# Patient Record
Sex: FEMALE | Race: WHITE | Employment: OTHER | URBAN - NONMETROPOLITAN AREA
[De-identification: names, ages, dates, MRNs, and addresses within clinical notes are randomized per-mention and may not be internally consistent; named-entity substitution may affect disease eponyms.]

---

## 2017-01-05 ENCOUNTER — TELEPHONE (OUTPATIENT)
Dept: NEUROLOGY | Age: 52
End: 2017-01-05

## 2017-01-09 ENCOUNTER — TELEPHONE (OUTPATIENT)
Dept: NEUROLOGY | Age: 52
End: 2017-01-09

## 2017-01-12 RX ORDER — BUTALBITAL, ACETAMINOPHEN, CAFFEINE AND CODEINE PHOSPHATE 50; 325; 40; 30 MG/1; MG/1; MG/1; MG/1
1 CAPSULE ORAL EVERY 8 HOURS PRN
Qty: 60 CAPSULE | Refills: 5 | Status: SHIPPED | OUTPATIENT
Start: 2017-01-12 | End: 2017-06-23 | Stop reason: SDUPTHER

## 2017-01-25 ENCOUNTER — PROCEDURE VISIT (OUTPATIENT)
Dept: NEUROLOGY | Age: 52
End: 2017-01-25
Payer: MEDICARE

## 2017-01-25 VITALS
HEIGHT: 63 IN | SYSTOLIC BLOOD PRESSURE: 152 MMHG | HEART RATE: 113 BPM | WEIGHT: 133 LBS | BODY MASS INDEX: 23.57 KG/M2 | DIASTOLIC BLOOD PRESSURE: 101 MMHG

## 2017-01-25 DIAGNOSIS — R49.0 VOICE HOARSENESS: ICD-10-CM

## 2017-01-25 DIAGNOSIS — G43.909 MIGRAINE WITHOUT STATUS MIGRAINOSUS, NOT INTRACTABLE, UNSPECIFIED MIGRAINE TYPE: Primary | ICD-10-CM

## 2017-01-25 DIAGNOSIS — G89.29 OTHER CHRONIC PAIN: ICD-10-CM

## 2017-01-25 DIAGNOSIS — R51.9 HEADACHE DISORDER: ICD-10-CM

## 2017-01-25 DIAGNOSIS — M54.2 PAIN, NECK: ICD-10-CM

## 2017-01-25 PROCEDURE — 4004F PT TOBACCO SCREEN RCVD TLK: CPT | Performed by: PSYCHIATRY & NEUROLOGY

## 2017-01-25 PROCEDURE — 64405 NJX AA&/STRD GR OCPL NRV: CPT | Performed by: PSYCHIATRY & NEUROLOGY

## 2017-01-25 PROCEDURE — 99999 PR OFFICE/OUTPT VISIT,PROCEDURE ONLY: CPT | Performed by: PSYCHIATRY & NEUROLOGY

## 2017-02-07 RX ORDER — ACETAMINOPHEN AND CODEINE PHOSPHATE 300; 30 MG/1; MG/1
TABLET ORAL
Qty: 60 TABLET | Refills: 5 | Status: SHIPPED | OUTPATIENT
Start: 2017-02-07 | End: 2017-05-25 | Stop reason: SDUPTHER

## 2017-02-24 ENCOUNTER — TELEPHONE (OUTPATIENT)
Dept: NEUROLOGY | Age: 52
End: 2017-02-24

## 2017-04-04 ENCOUNTER — PROCEDURE VISIT (OUTPATIENT)
Dept: NEUROLOGY | Age: 52
End: 2017-04-04
Payer: MEDICARE

## 2017-04-04 VITALS
DIASTOLIC BLOOD PRESSURE: 90 MMHG | OXYGEN SATURATION: 95 % | HEART RATE: 90 BPM | WEIGHT: 139 LBS | SYSTOLIC BLOOD PRESSURE: 146 MMHG | BODY MASS INDEX: 24.62 KG/M2

## 2017-04-04 DIAGNOSIS — M54.2 PAIN, NECK: ICD-10-CM

## 2017-04-04 DIAGNOSIS — G43.909 MIGRAINE WITHOUT STATUS MIGRAINOSUS, NOT INTRACTABLE, UNSPECIFIED MIGRAINE TYPE: Primary | ICD-10-CM

## 2017-04-04 DIAGNOSIS — R51.9 HEADACHE DISORDER: ICD-10-CM

## 2017-04-04 DIAGNOSIS — G89.29 OTHER CHRONIC PAIN: ICD-10-CM

## 2017-04-04 DIAGNOSIS — R49.0 VOICE HOARSENESS: ICD-10-CM

## 2017-04-04 PROCEDURE — 64405 NJX AA&/STRD GR OCPL NRV: CPT | Performed by: PSYCHIATRY & NEUROLOGY

## 2017-04-04 PROCEDURE — 4004F PT TOBACCO SCREEN RCVD TLK: CPT | Performed by: PSYCHIATRY & NEUROLOGY

## 2017-05-03 ENCOUNTER — PROCEDURE VISIT (OUTPATIENT)
Dept: NEUROLOGY | Age: 52
End: 2017-05-03
Payer: MEDICARE

## 2017-05-03 VITALS
DIASTOLIC BLOOD PRESSURE: 71 MMHG | WEIGHT: 143 LBS | HEART RATE: 93 BPM | BODY MASS INDEX: 25.34 KG/M2 | SYSTOLIC BLOOD PRESSURE: 111 MMHG | OXYGEN SATURATION: 97 % | HEIGHT: 63 IN

## 2017-05-03 DIAGNOSIS — R51.9 HEADACHE DISORDER: ICD-10-CM

## 2017-05-03 DIAGNOSIS — G43.909 MIGRAINE WITHOUT STATUS MIGRAINOSUS, NOT INTRACTABLE, UNSPECIFIED MIGRAINE TYPE: Primary | ICD-10-CM

## 2017-05-03 DIAGNOSIS — G89.29 OTHER CHRONIC PAIN: ICD-10-CM

## 2017-05-03 DIAGNOSIS — M54.2 PAIN, NECK: ICD-10-CM

## 2017-05-03 DIAGNOSIS — R49.0 VOICE HOARSENESS: ICD-10-CM

## 2017-05-03 PROCEDURE — 4004F PT TOBACCO SCREEN RCVD TLK: CPT | Performed by: PSYCHIATRY & NEUROLOGY

## 2017-05-03 PROCEDURE — 64405 NJX AA&/STRD GR OCPL NRV: CPT | Performed by: PSYCHIATRY & NEUROLOGY

## 2017-05-03 PROCEDURE — 99999 PR OFFICE/OUTPT VISIT,PROCEDURE ONLY: CPT | Performed by: PSYCHIATRY & NEUROLOGY

## 2017-05-03 RX ORDER — CARISOPRODOL 350 MG/1
350 TABLET ORAL 2 TIMES DAILY
Qty: 60 TABLET | Refills: 5 | Status: SHIPPED | OUTPATIENT
Start: 2017-05-03 | End: 2017-07-13

## 2017-05-25 RX ORDER — ACETAMINOPHEN AND CODEINE PHOSPHATE 300; 30 MG/1; MG/1
TABLET ORAL
Qty: 60 TABLET | Refills: 5 | Status: SHIPPED | OUTPATIENT
Start: 2017-05-25 | End: 2017-12-06 | Stop reason: SDUPTHER

## 2017-06-01 ENCOUNTER — PROCEDURE VISIT (OUTPATIENT)
Dept: NEUROLOGY | Age: 52
End: 2017-06-01
Payer: MEDICARE

## 2017-06-01 VITALS
BODY MASS INDEX: 25.34 KG/M2 | HEIGHT: 63 IN | SYSTOLIC BLOOD PRESSURE: 136 MMHG | DIASTOLIC BLOOD PRESSURE: 88 MMHG | WEIGHT: 143 LBS | RESPIRATION RATE: 18 BRPM | HEART RATE: 78 BPM

## 2017-06-01 DIAGNOSIS — R49.0 VOICE HOARSENESS: ICD-10-CM

## 2017-06-01 DIAGNOSIS — G89.29 OTHER CHRONIC PAIN: ICD-10-CM

## 2017-06-01 DIAGNOSIS — M54.2 PAIN, NECK: ICD-10-CM

## 2017-06-01 DIAGNOSIS — G43.909 MIGRAINE WITHOUT STATUS MIGRAINOSUS, NOT INTRACTABLE, UNSPECIFIED MIGRAINE TYPE: Primary | ICD-10-CM

## 2017-06-01 DIAGNOSIS — R51.9 HEADACHE DISORDER: ICD-10-CM

## 2017-06-01 PROCEDURE — 4004F PT TOBACCO SCREEN RCVD TLK: CPT | Performed by: PSYCHIATRY & NEUROLOGY

## 2017-06-01 PROCEDURE — 64405 NJX AA&/STRD GR OCPL NRV: CPT | Performed by: PSYCHIATRY & NEUROLOGY

## 2017-06-23 RX ORDER — BUTALBITAL, ACETAMINOPHEN, CAFFEINE AND CODEINE PHOSPHATE 50; 325; 40; 30 MG/1; MG/1; MG/1; MG/1
1 CAPSULE ORAL EVERY 8 HOURS PRN
Qty: 60 CAPSULE | Refills: 0 | Status: SHIPPED | OUTPATIENT
Start: 2017-06-23 | End: 2017-07-13 | Stop reason: SDUPTHER

## 2017-07-13 ENCOUNTER — PROCEDURE VISIT (OUTPATIENT)
Dept: NEUROLOGY | Age: 52
End: 2017-07-13
Payer: MEDICARE

## 2017-07-13 VITALS
HEART RATE: 100 BPM | SYSTOLIC BLOOD PRESSURE: 145 MMHG | OXYGEN SATURATION: 97 % | BODY MASS INDEX: 24.98 KG/M2 | WEIGHT: 141 LBS | DIASTOLIC BLOOD PRESSURE: 107 MMHG | HEIGHT: 63 IN

## 2017-07-13 DIAGNOSIS — M54.2 PAIN, NECK: ICD-10-CM

## 2017-07-13 DIAGNOSIS — G89.29 OTHER CHRONIC PAIN: ICD-10-CM

## 2017-07-13 DIAGNOSIS — R49.0 VOICE HOARSENESS: ICD-10-CM

## 2017-07-13 DIAGNOSIS — G43.909 MIGRAINE WITHOUT STATUS MIGRAINOSUS, NOT INTRACTABLE, UNSPECIFIED MIGRAINE TYPE: Primary | ICD-10-CM

## 2017-07-13 DIAGNOSIS — R51.9 HEADACHE DISORDER: ICD-10-CM

## 2017-07-13 PROCEDURE — 64405 NJX AA&/STRD GR OCPL NRV: CPT | Performed by: PSYCHIATRY & NEUROLOGY

## 2017-07-13 PROCEDURE — 4004F PT TOBACCO SCREEN RCVD TLK: CPT | Performed by: PSYCHIATRY & NEUROLOGY

## 2017-07-13 PROCEDURE — 99999 PR OFFICE/OUTPT VISIT,PROCEDURE ONLY: CPT | Performed by: PSYCHIATRY & NEUROLOGY

## 2017-07-13 RX ORDER — BUTALBITAL, ACETAMINOPHEN, CAFFEINE AND CODEINE PHOSPHATE 50; 325; 40; 30 MG/1; MG/1; MG/1; MG/1
1 CAPSULE ORAL EVERY 8 HOURS PRN
Qty: 90 CAPSULE | Refills: 5 | Status: SHIPPED | OUTPATIENT
Start: 2017-07-13 | End: 2017-12-21 | Stop reason: SDUPTHER

## 2017-08-14 ENCOUNTER — PROCEDURE VISIT (OUTPATIENT)
Dept: NEUROLOGY | Age: 52
End: 2017-08-14
Payer: MEDICARE

## 2017-08-14 VITALS
WEIGHT: 138 LBS | DIASTOLIC BLOOD PRESSURE: 82 MMHG | BODY MASS INDEX: 24.45 KG/M2 | SYSTOLIC BLOOD PRESSURE: 120 MMHG | HEART RATE: 87 BPM | OXYGEN SATURATION: 95 % | HEIGHT: 63 IN

## 2017-08-14 DIAGNOSIS — R51.9 HEADACHE DISORDER: ICD-10-CM

## 2017-08-14 DIAGNOSIS — G43.909 MIGRAINE WITHOUT STATUS MIGRAINOSUS, NOT INTRACTABLE, UNSPECIFIED MIGRAINE TYPE: Primary | ICD-10-CM

## 2017-08-14 DIAGNOSIS — R49.0 VOICE HOARSENESS: ICD-10-CM

## 2017-08-14 DIAGNOSIS — M54.2 PAIN, NECK: ICD-10-CM

## 2017-08-14 DIAGNOSIS — G89.29 OTHER CHRONIC PAIN: ICD-10-CM

## 2017-08-14 PROCEDURE — 4004F PT TOBACCO SCREEN RCVD TLK: CPT | Performed by: PSYCHIATRY & NEUROLOGY

## 2017-08-14 PROCEDURE — 64405 NJX AA&/STRD GR OCPL NRV: CPT | Performed by: PSYCHIATRY & NEUROLOGY

## 2017-09-27 ENCOUNTER — OFFICE VISIT (OUTPATIENT)
Dept: NEUROLOGY | Age: 52
End: 2017-09-27
Payer: MEDICARE

## 2017-09-27 VITALS — HEART RATE: 104 BPM | SYSTOLIC BLOOD PRESSURE: 151 MMHG | DIASTOLIC BLOOD PRESSURE: 91 MMHG | OXYGEN SATURATION: 94 %

## 2017-09-27 DIAGNOSIS — G89.29 OTHER CHRONIC PAIN: ICD-10-CM

## 2017-09-27 DIAGNOSIS — M54.2 PAIN, NECK: ICD-10-CM

## 2017-09-27 DIAGNOSIS — G43.909 MIGRAINE WITHOUT STATUS MIGRAINOSUS, NOT INTRACTABLE, UNSPECIFIED MIGRAINE TYPE: Primary | ICD-10-CM

## 2017-09-27 DIAGNOSIS — R51.9 HEADACHE DISORDER: ICD-10-CM

## 2017-09-27 DIAGNOSIS — R49.0 VOICE HOARSENESS: ICD-10-CM

## 2017-09-27 PROCEDURE — 64405 NJX AA&/STRD GR OCPL NRV: CPT | Performed by: PSYCHIATRY & NEUROLOGY

## 2017-09-27 PROCEDURE — 4004F PT TOBACCO SCREEN RCVD TLK: CPT | Performed by: PSYCHIATRY & NEUROLOGY

## 2017-10-23 DIAGNOSIS — G89.29 OTHER CHRONIC PAIN: ICD-10-CM

## 2017-10-23 RX ORDER — CARISOPRODOL 350 MG/1
TABLET ORAL
Qty: 60 TABLET | Refills: 4 | OUTPATIENT
Start: 2017-10-23

## 2017-10-23 RX ORDER — CARISOPRODOL 350 MG/1
350 TABLET ORAL 2 TIMES DAILY
Qty: 60 TABLET | Refills: 5 | Status: CANCELLED | OUTPATIENT
Start: 2017-10-23

## 2017-10-24 DIAGNOSIS — G89.29 OTHER CHRONIC PAIN: ICD-10-CM

## 2017-10-25 RX ORDER — CARISOPRODOL 350 MG/1
TABLET ORAL
Qty: 60 TABLET | Refills: 4 | Status: SHIPPED | OUTPATIENT
Start: 2017-10-25 | End: 2018-03-13 | Stop reason: SDUPTHER

## 2017-11-02 ENCOUNTER — TELEPHONE (OUTPATIENT)
Dept: NEUROSURGERY | Age: 52
End: 2017-11-02

## 2017-11-02 NOTE — TELEPHONE ENCOUNTER
Tried calling patient to let her know that her appointment for 11-03-17 was being rescheduled due to the provider will not be in the office. It stated when I called Im sorry your call did not go through. We do not have any other # in chart to call.

## 2017-11-14 ENCOUNTER — PROCEDURE VISIT (OUTPATIENT)
Dept: NEUROLOGY | Age: 52
End: 2017-11-14
Payer: MEDICARE

## 2017-11-14 VITALS
SYSTOLIC BLOOD PRESSURE: 143 MMHG | BODY MASS INDEX: 24.45 KG/M2 | DIASTOLIC BLOOD PRESSURE: 93 MMHG | HEART RATE: 87 BPM | WEIGHT: 138 LBS

## 2017-11-14 DIAGNOSIS — R49.0 VOICE HOARSENESS: ICD-10-CM

## 2017-11-14 DIAGNOSIS — M54.2 PAIN, NECK: ICD-10-CM

## 2017-11-14 DIAGNOSIS — R51.9 HEADACHE DISORDER: ICD-10-CM

## 2017-11-14 DIAGNOSIS — G43.909 MIGRAINE WITHOUT STATUS MIGRAINOSUS, NOT INTRACTABLE, UNSPECIFIED MIGRAINE TYPE: Primary | ICD-10-CM

## 2017-11-14 DIAGNOSIS — G89.29 OTHER CHRONIC PAIN: ICD-10-CM

## 2017-11-14 PROCEDURE — 64405 NJX AA&/STRD GR OCPL NRV: CPT | Performed by: PSYCHIATRY & NEUROLOGY
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
## 2017-12-07 RX ORDER — ACETAMINOPHEN AND CODEINE PHOSPHATE 300; 30 MG/1; MG/1
TABLET ORAL
Qty: 60 TABLET | Refills: 5 | Status: SHIPPED | OUTPATIENT
Start: 2017-12-07 | End: 2018-01-06

## 2017-12-20 RX ORDER — BUTALBITAL, ACETAMINOPHEN, CAFFEINE AND CODEINE PHOSPHATE 50; 325; 40; 30 MG/1; MG/1; MG/1; MG/1
1 CAPSULE ORAL EVERY 8 HOURS PRN
Qty: 90 CAPSULE | Refills: 5 | Status: CANCELLED | OUTPATIENT
Start: 2017-12-20

## 2017-12-21 ENCOUNTER — OFFICE VISIT (OUTPATIENT)
Dept: NEUROLOGY | Age: 52
End: 2017-12-21
Payer: MEDICARE

## 2017-12-21 VITALS
BODY MASS INDEX: 24.8 KG/M2 | SYSTOLIC BLOOD PRESSURE: 144 MMHG | HEART RATE: 74 BPM | DIASTOLIC BLOOD PRESSURE: 89 MMHG | WEIGHT: 140 LBS

## 2017-12-21 DIAGNOSIS — G43.909 MIGRAINE WITHOUT STATUS MIGRAINOSUS, NOT INTRACTABLE, UNSPECIFIED MIGRAINE TYPE: Primary | ICD-10-CM

## 2017-12-21 DIAGNOSIS — R49.0 VOICE HOARSENESS: ICD-10-CM

## 2017-12-21 DIAGNOSIS — M54.2 PAIN, NECK: ICD-10-CM

## 2017-12-21 DIAGNOSIS — R51.9 HEADACHE DISORDER: ICD-10-CM

## 2017-12-21 DIAGNOSIS — G89.29 OTHER CHRONIC PAIN: ICD-10-CM

## 2017-12-21 PROCEDURE — 64405 NJX AA&/STRD GR OCPL NRV: CPT | Performed by: PSYCHIATRY & NEUROLOGY

## 2017-12-21 RX ORDER — BUTALBITAL, ACETAMINOPHEN, CAFFEINE AND CODEINE PHOSPHATE 50; 325; 40; 30 MG/1; MG/1; MG/1; MG/1
1 CAPSULE ORAL EVERY 8 HOURS PRN
Qty: 90 CAPSULE | Refills: 5 | Status: SHIPPED | OUTPATIENT
Start: 2017-12-21 | End: 2018-01-20

## 2017-12-21 NOTE — PROGRESS NOTES
ago TOTAL        Family History   Problem Relation Age of Onset   Steve Noriega Cancer Mother      ovarian    Diabetes Mother     Cancer Father     High Blood Pressure Brother     Diabetes Maternal Grandmother     Cancer Other      bladder       No Known Allergies    Social History     Social History    Marital status: Single     Spouse name: N/A    Number of children: N/A    Years of education: N/A     Occupational History    Not on file. Social History Main Topics    Smoking status: Current Every Day Smoker     Packs/day: 1.00     Years: 20.00    Smokeless tobacco: Never Used    Alcohol use Yes      Comment: occasional     Drug use: No    Sexual activity: No     Other Topics Concern    Not on file     Social History Narrative    No narrative on file           Review of Systems     Constitutional - No fever or chills. No diaphoresis or significant fatigue. HENT -  No tinnitus or significant hearing loss. Eyes - no sudden vision change or eye pain  Respiratory - no significant shortness of breath yes cough  Cardiovascular - no chest pain No palpitations or significant leg swelling  Gastrointestinal - no abdominal swelling or pain. Genitourinary - No difficulty urinating, dysuria  Musculoskeletal - yes back pain or myalgia. Skin - no color change or rash  Neurologic - No seizures. No lateralizing weakness. Hematologic - no easy bruising or excessive bleeding. Psychiatric - yes severe anxiety or nervousness. All other review of systems are negative.                  Current Outpatient Prescriptions   Medication Sig Dispense Refill    butalbital-acetaminophen-caffeine-codeine (FIORICET WITH CODEINE) -96-30 MG per capsule Take 1 capsule by mouth every 8 hours as needed for Headaches .  90 capsule 5    acetaminophen-codeine (TYLENOL #3) 300-30 MG per tablet TAKE ONE TABLET BY MOUTH EVERY SIX HOURS AS NEEDED FOR PAIN 60 tablet 5    carisoprodol (SOMA) 350 MG tablet TAKE 1 TABLET TWO TIMES A DAY 60 tablet 4    fenofibrate 160 MG tablet   3    promethazine (PHENERGAN) 25 MG tablet   5    lisinopril (PRINIVIL;ZESTRIL) 5 MG tablet       ABILIFY 5 MG tablet       clonazePAM (KLONOPIN) 0.5 MG tablet 3 times daily as needed       CYMBALTA 60 MG capsule       estradiol (CLIMARA) 0.1 MG/24HR       PREMARIN 1.25 MG tablet       gabapentin (NEURONTIN) 800 MG tablet 3 times daily       zolpidem (AMBIEN CR) 12.5 MG CR tablet       PROMETHEGAN 25 MG suppository       pravastatin (PRAVACHOL) 20 MG tablet       levothyroxine (SYNTHROID) 88 MCG tablet        No current facility-administered medications for this visit. BP (!) 144/89   Pulse 74   Wt 140 lb (63.5 kg)   BMI 24.80 kg/m²     Constitutional - well developed, well nourished. HENT - head normocephalic. Eyes - conjunctiva normal.  EOMS normal.  Neck- ROM appears normal, no tracheal deviation. Extremities -no edema     Musculoskeletal - ROM appears normal.  No significant edema. Skin - warm, dry, and intact. No rash, erythema, or pallor. Psychiatric - mood, affect, and behavior appear normal.      Neurological exam  Awake, alert, fluent  appropriate affect  Speech normal without dysarthria    Cranial Nerve Exam    EOMI, No nystagmus, conjugate eye movements, no ptosis  Symmetric facies    Motor Exam  Antigravity throughout      Tremors- no tremors in hands or head noted    Gait  Normal base and speed  No ataxia      No results found for: [id number]  No results found for: WBC, HGB, HCT, MCV, PLT  No results found for: NA, K, CL, CO2, BUN, CREATININE, GLUCOSE, CALCIUM, PROT, LABALBU, BILITOT, ALKPHOS, AST, ALT, LABGLOM, GFRAA, AGRATIO, GLOB        Assessment    ICD-10-CM ICD-9-CM    1. Migraine without status migrainosus, not intractable, unspecified migraine type G43.909 346.90    2. Pain, neck M54.2 723.1 [place] OFFICE/OUTPT VISIT,PROCEDURE ONLY   3. Headache disorder R51 784.0    4. Voice hoarseness R49.0 784.42    5.  Other chronic pain

## 2018-01-23 ENCOUNTER — PROCEDURE VISIT (OUTPATIENT)
Dept: NEUROLOGY | Age: 53
End: 2018-01-23
Payer: MEDICARE

## 2018-01-23 VITALS
RESPIRATION RATE: 18 BRPM | BODY MASS INDEX: 25.16 KG/M2 | WEIGHT: 142 LBS | DIASTOLIC BLOOD PRESSURE: 86 MMHG | SYSTOLIC BLOOD PRESSURE: 139 MMHG | HEIGHT: 63 IN | HEART RATE: 84 BPM

## 2018-01-23 DIAGNOSIS — G89.29 OTHER CHRONIC PAIN: ICD-10-CM

## 2018-01-23 DIAGNOSIS — M54.2 PAIN, NECK: ICD-10-CM

## 2018-01-23 DIAGNOSIS — R51.9 HEADACHE DISORDER: ICD-10-CM

## 2018-01-23 DIAGNOSIS — R49.0 VOICE HOARSENESS: ICD-10-CM

## 2018-01-23 DIAGNOSIS — G43.909 MIGRAINE WITHOUT STATUS MIGRAINOSUS, NOT INTRACTABLE, UNSPECIFIED MIGRAINE TYPE: Primary | ICD-10-CM

## 2018-01-23 PROCEDURE — 64405 NJX AA&/STRD GR OCPL NRV: CPT | Performed by: PSYCHIATRY & NEUROLOGY

## 2018-01-23 RX ORDER — ACETAMINOPHEN AND CODEINE PHOSPHATE 300; 30 MG/1; MG/1
TABLET ORAL
Refills: 5 | COMMUNITY
Start: 2018-01-17 | End: 2018-05-15 | Stop reason: SDUPTHER

## 2018-01-23 NOTE — PROGRESS NOTES
much of her symptoms are related to musculoskeletal injury and strain. Her MRI of the brain, MRA of the head, MRI of the cervical and lumbosacral spine were unremarkable. Her EMG with nerve conduction study of her right arm and legs was consistent with a mild carpal tunnel syndrome. She was continued with Soma for her neck discomfort and referred to physical therapy. She was given a script for Flector patches to be applied to her neck or back to see if this provides any benefit. Today she had 10 cc of bupivacaine in to her bilateral occipital nerve and cervical regions. We discussed referral to pain management but she did not wish to pursue this. She did not wish to initiate any new medications. Her venous US of the legs were unremarkable. Her MRI of the C and LS spine was stable. The patient indicated understanding of the diagnosis and treatment plan. She is to follow up with me in approximately 4 weeks and call with any further problems. Continue present care    Plan    Orders Placed This Encounter   Procedures    [place] OFFICE/OUTPT VISIT,PROCEDURE ONLY       No orders of the defined types were placed in this encounter. Return in about 4 weeks (around 2/20/2018).

## 2018-03-13 DIAGNOSIS — G89.29 OTHER CHRONIC PAIN: ICD-10-CM

## 2018-03-13 NOTE — TELEPHONE ENCOUNTER
Requested Prescriptions     Pending Prescriptions Disp Refills    carisoprodol (SOMA) 350 MG tablet 60 tablet 4     Sig: Take 1 tablet by mouth 2 times daily for 30 days.        Last OV 1/23/18  Next OV 3/28/18  Last Rx 10/25/17 with 4 refills  Radha Coburn NEEDS ONE

## 2018-03-14 RX ORDER — CARISOPRODOL 350 MG/1
350 TABLET ORAL 2 TIMES DAILY
Qty: 60 TABLET | Refills: 5 | Status: SHIPPED | OUTPATIENT
Start: 2018-03-14 | End: 2018-05-31 | Stop reason: SDUPTHER

## 2018-03-28 ENCOUNTER — PROCEDURE VISIT (OUTPATIENT)
Dept: NEUROLOGY | Age: 53
End: 2018-03-28
Payer: MEDICARE

## 2018-03-28 VITALS
DIASTOLIC BLOOD PRESSURE: 91 MMHG | SYSTOLIC BLOOD PRESSURE: 133 MMHG | BODY MASS INDEX: 24.8 KG/M2 | HEART RATE: 81 BPM | WEIGHT: 140 LBS

## 2018-03-28 DIAGNOSIS — M54.2 PAIN, NECK: ICD-10-CM

## 2018-03-28 DIAGNOSIS — R49.0 VOICE HOARSENESS: ICD-10-CM

## 2018-03-28 DIAGNOSIS — G43.909 MIGRAINE WITHOUT STATUS MIGRAINOSUS, NOT INTRACTABLE, UNSPECIFIED MIGRAINE TYPE: Primary | ICD-10-CM

## 2018-03-28 DIAGNOSIS — R51.9 HEADACHE DISORDER: ICD-10-CM

## 2018-03-28 DIAGNOSIS — G89.29 OTHER CHRONIC PAIN: ICD-10-CM

## 2018-03-28 PROCEDURE — 64405 NJX AA&/STRD GR OCPL NRV: CPT | Performed by: PSYCHIATRY & NEUROLOGY
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
## 2018-04-24 ENCOUNTER — TELEPHONE (OUTPATIENT)
Dept: NEUROLOGY | Age: 53
End: 2018-04-24

## 2018-04-30 ENCOUNTER — PROCEDURE VISIT (OUTPATIENT)
Dept: NEUROLOGY | Age: 53
End: 2018-04-30
Payer: MEDICARE

## 2018-04-30 VITALS
HEIGHT: 63 IN | DIASTOLIC BLOOD PRESSURE: 80 MMHG | SYSTOLIC BLOOD PRESSURE: 114 MMHG | WEIGHT: 138 LBS | BODY MASS INDEX: 24.45 KG/M2 | HEART RATE: 93 BPM

## 2018-04-30 DIAGNOSIS — G43.909 MIGRAINE WITHOUT STATUS MIGRAINOSUS, NOT INTRACTABLE, UNSPECIFIED MIGRAINE TYPE: Primary | ICD-10-CM

## 2018-04-30 DIAGNOSIS — R51.9 HEADACHE DISORDER: ICD-10-CM

## 2018-04-30 DIAGNOSIS — M54.2 PAIN, NECK: ICD-10-CM

## 2018-04-30 DIAGNOSIS — G89.29 OTHER CHRONIC PAIN: ICD-10-CM

## 2018-04-30 PROCEDURE — 64405 NJX AA&/STRD GR OCPL NRV: CPT | Performed by: PSYCHIATRY & NEUROLOGY

## 2018-04-30 RX ORDER — BUTALBITAL, ACETAMINOPHEN, CAFFEINE AND CODEINE PHOSPHATE 50; 325; 40; 30 MG/1; MG/1; MG/1; MG/1
CAPSULE ORAL
Refills: 5 | COMMUNITY
Start: 2018-04-25 | End: 2018-06-19 | Stop reason: SDUPTHER

## 2018-04-30 RX ORDER — CARISOPRODOL 350 MG/1
350 TABLET ORAL 4 TIMES DAILY PRN
COMMUNITY
End: 2019-05-22

## 2018-05-04 RX ORDER — ACETAMINOPHEN AND CODEINE PHOSPHATE 300; 30 MG/1; MG/1
TABLET ORAL
Qty: 60 TABLET | Refills: 5 | OUTPATIENT
Start: 2018-05-04 | End: 2018-06-03

## 2018-05-15 RX ORDER — ACETAMINOPHEN AND CODEINE PHOSPHATE 300; 30 MG/1; MG/1
1 TABLET ORAL EVERY 6 HOURS PRN
Qty: 60 TABLET | Refills: 5 | Status: SHIPPED | OUTPATIENT
Start: 2018-05-15 | End: 2018-11-12 | Stop reason: SDUPTHER

## 2018-05-31 ENCOUNTER — PROCEDURE VISIT (OUTPATIENT)
Dept: NEUROLOGY | Age: 53
End: 2018-05-31
Payer: MEDICARE

## 2018-05-31 VITALS
SYSTOLIC BLOOD PRESSURE: 130 MMHG | BODY MASS INDEX: 24.5 KG/M2 | HEART RATE: 85 BPM | WEIGHT: 138.25 LBS | DIASTOLIC BLOOD PRESSURE: 82 MMHG | HEIGHT: 63 IN

## 2018-05-31 DIAGNOSIS — R51.9 HEADACHE DISORDER: ICD-10-CM

## 2018-05-31 DIAGNOSIS — G43.909 MIGRAINE WITHOUT STATUS MIGRAINOSUS, NOT INTRACTABLE, UNSPECIFIED MIGRAINE TYPE: Primary | ICD-10-CM

## 2018-05-31 DIAGNOSIS — G89.29 OTHER CHRONIC PAIN: ICD-10-CM

## 2018-05-31 DIAGNOSIS — M54.2 PAIN, NECK: ICD-10-CM

## 2018-05-31 PROCEDURE — 20553 NJX 1/MLT TRIGGER POINTS 3/>: CPT | Performed by: PSYCHIATRY & NEUROLOGY

## 2018-05-31 RX ORDER — BENZTROPINE MESYLATE 1 MG/1
1 TABLET ORAL DAILY
COMMUNITY

## 2018-05-31 RX ORDER — CARISOPRODOL 350 MG/1
350 TABLET ORAL 3 TIMES DAILY PRN
Qty: 90 TABLET | Refills: 5 | Status: SHIPPED | OUTPATIENT
Start: 2018-05-31 | End: 2018-06-30

## 2018-06-19 DIAGNOSIS — R51.9 NONINTRACTABLE HEADACHE, UNSPECIFIED CHRONICITY PATTERN, UNSPECIFIED HEADACHE TYPE: Primary | ICD-10-CM

## 2018-06-19 RX ORDER — BUTALBITAL, ACETAMINOPHEN, CAFFEINE AND CODEINE PHOSPHATE 50; 325; 40; 30 MG/1; MG/1; MG/1; MG/1
CAPSULE ORAL
Qty: 90 CAPSULE | Refills: 4 | OUTPATIENT
Start: 2018-06-19

## 2018-06-20 RX ORDER — BUTALBITAL, ACETAMINOPHEN, CAFFEINE AND CODEINE PHOSPHATE 50; 325; 40; 30 MG/1; MG/1; MG/1; MG/1
1 CAPSULE ORAL EVERY 6 HOURS PRN
Qty: 90 CAPSULE | Refills: 5 | Status: SHIPPED | OUTPATIENT
Start: 2018-06-20 | End: 2018-11-21 | Stop reason: SDUPTHER

## 2018-06-28 ENCOUNTER — PROCEDURE VISIT (OUTPATIENT)
Dept: NEUROLOGY | Age: 53
End: 2018-06-28
Payer: MEDICARE

## 2018-06-28 VITALS
WEIGHT: 135 LBS | HEIGHT: 63 IN | BODY MASS INDEX: 23.92 KG/M2 | DIASTOLIC BLOOD PRESSURE: 91 MMHG | HEART RATE: 67 BPM | SYSTOLIC BLOOD PRESSURE: 147 MMHG

## 2018-06-28 DIAGNOSIS — R51.9 HEADACHE DISORDER: ICD-10-CM

## 2018-06-28 DIAGNOSIS — G43.909 MIGRAINE WITHOUT STATUS MIGRAINOSUS, NOT INTRACTABLE, UNSPECIFIED MIGRAINE TYPE: ICD-10-CM

## 2018-06-28 DIAGNOSIS — G89.29 OTHER CHRONIC PAIN: ICD-10-CM

## 2018-06-28 DIAGNOSIS — R51.9 NONINTRACTABLE HEADACHE, UNSPECIFIED CHRONICITY PATTERN, UNSPECIFIED HEADACHE TYPE: Primary | ICD-10-CM

## 2018-06-28 DIAGNOSIS — R49.0 VOICE HOARSENESS: ICD-10-CM

## 2018-06-28 DIAGNOSIS — M54.2 PAIN, NECK: ICD-10-CM

## 2018-06-28 PROCEDURE — 20553 NJX 1/MLT TRIGGER POINTS 3/>: CPT | Performed by: PSYCHIATRY & NEUROLOGY

## 2018-07-19 ENCOUNTER — PROCEDURE VISIT (OUTPATIENT)
Dept: NEUROLOGY | Age: 53
End: 2018-07-19
Payer: MEDICARE

## 2018-07-19 VITALS
SYSTOLIC BLOOD PRESSURE: 151 MMHG | WEIGHT: 139.56 LBS | BODY MASS INDEX: 24.72 KG/M2 | HEART RATE: 78 BPM | DIASTOLIC BLOOD PRESSURE: 79 MMHG

## 2018-07-19 DIAGNOSIS — G43.909 MIGRAINE WITHOUT STATUS MIGRAINOSUS, NOT INTRACTABLE, UNSPECIFIED MIGRAINE TYPE: ICD-10-CM

## 2018-07-19 DIAGNOSIS — M54.2 PAIN, NECK: ICD-10-CM

## 2018-07-19 DIAGNOSIS — R51.9 HEADACHE DISORDER: ICD-10-CM

## 2018-07-19 DIAGNOSIS — R51.9 NONINTRACTABLE HEADACHE, UNSPECIFIED CHRONICITY PATTERN, UNSPECIFIED HEADACHE TYPE: Primary | ICD-10-CM

## 2018-07-19 DIAGNOSIS — G89.29 OTHER CHRONIC PAIN: ICD-10-CM

## 2018-07-19 PROCEDURE — 20553 NJX 1/MLT TRIGGER POINTS 3/>: CPT | Performed by: PSYCHIATRY & NEUROLOGY

## 2018-08-16 ENCOUNTER — PROCEDURE VISIT (OUTPATIENT)
Dept: NEUROLOGY | Age: 53
End: 2018-08-16
Payer: MEDICARE

## 2018-08-16 VITALS
WEIGHT: 135 LBS | HEART RATE: 84 BPM | BODY MASS INDEX: 23.91 KG/M2 | DIASTOLIC BLOOD PRESSURE: 82 MMHG | SYSTOLIC BLOOD PRESSURE: 132 MMHG

## 2018-08-16 DIAGNOSIS — M54.2 PAIN, NECK: ICD-10-CM

## 2018-08-16 DIAGNOSIS — R51.9 NONINTRACTABLE HEADACHE, UNSPECIFIED CHRONICITY PATTERN, UNSPECIFIED HEADACHE TYPE: Primary | ICD-10-CM

## 2018-08-16 DIAGNOSIS — G43.909 MIGRAINE WITHOUT STATUS MIGRAINOSUS, NOT INTRACTABLE, UNSPECIFIED MIGRAINE TYPE: ICD-10-CM

## 2018-08-16 DIAGNOSIS — G89.29 OTHER CHRONIC PAIN: ICD-10-CM

## 2018-08-16 DIAGNOSIS — R51.9 HEADACHE DISORDER: ICD-10-CM

## 2018-08-16 PROCEDURE — 20553 NJX 1/MLT TRIGGER POINTS 3/>: CPT | Performed by: PSYCHIATRY & NEUROLOGY

## 2018-08-16 NOTE — PROGRESS NOTES
 CYMBALTA 60 MG capsule       estradiol (CLIMARA) 0.1 MG/24HR       PREMARIN 1.25 MG tablet       gabapentin (NEURONTIN) 800 MG tablet 3 times daily       zolpidem (AMBIEN CR) 12.5 MG CR tablet       PROMETHEGAN 25 MG suppository       pravastatin (PRAVACHOL) 20 MG tablet       levothyroxine (SYNTHROID) 88 MCG tablet        No current facility-administered medications for this visit. /82   Pulse 84   Wt 135 lb (61.2 kg)   BMI 23.91 kg/m²     Constitutional - well developed, well nourished. HENT - head normocephalic. Eyes - conjunctiva normal.  EOMS normal.  Neck- ROM appears normal, no tracheal deviation. Extremities -no edema     Musculoskeletal - ROM appears normal.  No significant edema. Skin - warm, dry, and intact. No rash, erythema, or pallor. Psychiatric - mood, affect, and behavior appear normal.      Neurological exam  Awake, alert, fluent  appropriate affect  Speech normal without dysarthria    Cranial Nerve Exam    EOMI, No nystagmus, conjugate eye movements, no ptosis  Symmetric facies    Motor Exam  Antigravity throughout      Tremors- no tremors in hands or head noted    Gait  Normal base and speed  No ataxia      No results found for: [id number]  No results found for: WBC, HGB, HCT, MCV, PLT  No results found for: NA, K, CL, CO2, BUN, CREATININE, GLUCOSE, CALCIUM, PROT, LABALBU, BILITOT, ALKPHOS, AST, ALT, LABGLOM, GFRAA, AGRATIO, GLOB        Assessment    ICD-10-CM ICD-9-CM    1. Nonintractable headache, unspecified chronicity pattern, unspecified headache type R51 784.0    2. Other chronic pain G89.29 338.29    3. Pain, neck M54.2 723.1 [place] OFFICE/OUTPT VISIT,PROCEDURE ONLY   4. Headache disorder R51 784.0    5. Migraine without status migrainosus, not intractable, unspecified migraine type G43.909 346.90        Her neurological examination today was significant for some decreased light touch over the third and fourth fingers of her right hand.  Based upon her history and examination, I suspect that much of her symptoms are related to musculoskeletal injury and strain. Her MRI of the brain, MRA of the head, MRI of the cervical and lumbosacral spine were unremarkable. Her EMG with nerve conduction study of her right arm and legs was consistent with a mild carpal tunnel syndrome. She was continued with Soma for her neck discomfort and referred to physical therapy. She was given a script for Flector patches to be applied to her neck or back to see if this provides any benefit. Today she had 10 cc of bupivacaine in to her bilateral occipital nerve and cervical regions. We discussed referral to pain management but she did not wish to pursue this. She did not wish to initiate any new medications. Her venous US of the legs were unremarkable. Her MRI of the C and LS spine was stable. The patient indicated understanding of the diagnosis and treatment plan. She is to follow up with me in approximately 4 weeks and call with any further problems. continue present care    Continue present care    Plan    Orders Placed This Encounter   Procedures    [place] OFFICE/OUTPT VISIT,PROCEDURE ONLY       No orders of the defined types were placed in this encounter. Return in about 4 weeks (around 9/13/2018).

## 2018-08-21 ENCOUNTER — TELEPHONE (OUTPATIENT)
Dept: NEUROLOGY | Age: 53
End: 2018-08-21

## 2018-08-21 NOTE — TELEPHONE ENCOUNTER
Submitted on covermymeds with silverscripts:    A pending Prior Authorization request for the Patient and Medication exist.

## 2018-09-13 ENCOUNTER — PROCEDURE VISIT (OUTPATIENT)
Dept: NEUROLOGY | Age: 53
End: 2018-09-13
Payer: MEDICARE

## 2018-09-13 VITALS
HEIGHT: 63 IN | SYSTOLIC BLOOD PRESSURE: 135 MMHG | HEART RATE: 73 BPM | BODY MASS INDEX: 24.63 KG/M2 | DIASTOLIC BLOOD PRESSURE: 85 MMHG | WEIGHT: 139 LBS

## 2018-09-13 DIAGNOSIS — R51.9 NONINTRACTABLE HEADACHE, UNSPECIFIED CHRONICITY PATTERN, UNSPECIFIED HEADACHE TYPE: Primary | ICD-10-CM

## 2018-09-13 DIAGNOSIS — R51.9 HEADACHE DISORDER: ICD-10-CM

## 2018-09-13 DIAGNOSIS — G43.909 MIGRAINE WITHOUT STATUS MIGRAINOSUS, NOT INTRACTABLE, UNSPECIFIED MIGRAINE TYPE: ICD-10-CM

## 2018-09-13 DIAGNOSIS — M54.2 PAIN, NECK: ICD-10-CM

## 2018-09-13 DIAGNOSIS — G89.29 OTHER CHRONIC PAIN: ICD-10-CM

## 2018-09-13 PROCEDURE — 20553 NJX 1/MLT TRIGGER POINTS 3/>: CPT | Performed by: PSYCHIATRY & NEUROLOGY
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
## 2018-10-11 ENCOUNTER — PROCEDURE VISIT (OUTPATIENT)
Dept: NEUROLOGY | Age: 53
End: 2018-10-11
Payer: MEDICARE

## 2018-10-11 VITALS
DIASTOLIC BLOOD PRESSURE: 81 MMHG | HEART RATE: 72 BPM | WEIGHT: 135 LBS | HEIGHT: 63 IN | SYSTOLIC BLOOD PRESSURE: 121 MMHG | BODY MASS INDEX: 23.92 KG/M2

## 2018-10-11 DIAGNOSIS — G43.909 MIGRAINE WITHOUT STATUS MIGRAINOSUS, NOT INTRACTABLE, UNSPECIFIED MIGRAINE TYPE: ICD-10-CM

## 2018-10-11 DIAGNOSIS — R51.9 HEADACHE DISORDER: ICD-10-CM

## 2018-10-11 DIAGNOSIS — R51.9 NONINTRACTABLE HEADACHE, UNSPECIFIED CHRONICITY PATTERN, UNSPECIFIED HEADACHE TYPE: Primary | ICD-10-CM

## 2018-10-11 DIAGNOSIS — M54.2 PAIN, NECK: ICD-10-CM

## 2018-10-11 DIAGNOSIS — G89.29 OTHER CHRONIC PAIN: ICD-10-CM

## 2018-10-11 PROCEDURE — 20553 NJX 1/MLT TRIGGER POINTS 3/>: CPT | Performed by: PSYCHIATRY & NEUROLOGY

## 2018-10-11 NOTE — PROGRESS NOTES
yrs ago TOTAL        Family History   Problem Relation Age of Onset    Cancer Mother         ovarian    Diabetes Mother     Cancer Father     High Blood Pressure Brother     Diabetes Maternal Grandmother     Cancer Other         bladder       No Known Allergies    Social History     Social History    Marital status: Single     Spouse name: N/A    Number of children: N/A    Years of education: N/A     Occupational History    Not on file. Social History Main Topics    Smoking status: Current Every Day Smoker     Packs/day: 1.00     Years: 20.00    Smokeless tobacco: Never Used    Alcohol use Yes      Comment: occasional     Drug use: No    Sexual activity: No     Other Topics Concern    Not on file     Social History Narrative    No narrative on file     Review of Systems     Constitutional - No fever or chills. No diaphoresis or significant fatigue. HENT -  No tinnitus or significant hearing loss. Eyes - no sudden vision change or eye pain  Respiratory - no significant shortness of breath or cough  Cardiovascular - no chest pain No palpitations or significant leg swelling  Gastrointestinal - no abdominal swelling or pain. Genitourinary - No difficulty urinating, dysuria  Musculoskeletal - yes back pain or myalgia. Skin - no color change or rash  Neurologic - No seizures. No lateralizing weakness. Hematologic - no easy bruising or excessive bleeding. Psychiatric - yes severe anxiety or nervousness. All other review of systems are negative. Current Outpatient Prescriptions   Medication Sig Dispense Refill    Acetaminophen-Codeine (TYLENOL WITH CODEINE #3 PO) Take by mouth      benztropine (COGENTIN) 1 MG tablet Take 1 mg by mouth daily      carisoprodol (SOMA) 350 MG tablet Take 350 mg by mouth 4 times daily as needed for Muscle spasms. Sybil Butts fenofibrate 160 MG tablet   3    promethazine (PHENERGAN) 25 MG tablet   5    lisinopril (PRINIVIL;ZESTRIL) 5 MG tablet       ABILIFY 5 decreased light touch over the third and fourth fingers of her right hand. Based upon her history and examination, I suspect that much of her symptoms are related to musculoskeletal injury and strain. Her MRI of the brain, MRA of the head, MRI of the cervical and lumbosacral spine were unremarkable. Her EMG with nerve conduction study of her right arm and legs was consistent with a mild carpal tunnel syndrome. She was continued with Soma for her neck discomfort and referred to physical therapy. She was given a script for Flector patches to be applied to her neck or back to see if this provides any benefit. Today she had 10 cc of bupivacaine in to her bilateral occipital nerve and cervical regions. We discussed referral to pain management but she did not wish to pursue this. She did not wish to initiate any new medications. Her venous US of the legs were unremarkable. Her MRI of the C and LS spine was stable. The patient indicated understanding of the diagnosis and treatment plan. She is to follow up with me in approximately 4 weeks and call with any further problems. continue present care    Continue present care    Plan    Orders Placed This Encounter   Procedures    [place] OFFICE/OUTPT VISIT,PROCEDURE ONLY       No orders of the defined types were placed in this encounter. Return in about 4 weeks (around 11/8/2018).

## 2018-11-21 ENCOUNTER — OFFICE VISIT (OUTPATIENT)
Dept: NEUROLOGY | Age: 53
End: 2018-11-21
Payer: MEDICARE

## 2018-11-21 VITALS
HEART RATE: 91 BPM | HEIGHT: 63 IN | DIASTOLIC BLOOD PRESSURE: 88 MMHG | SYSTOLIC BLOOD PRESSURE: 146 MMHG | BODY MASS INDEX: 24.8 KG/M2 | WEIGHT: 140 LBS

## 2018-11-21 DIAGNOSIS — R51.9 NONINTRACTABLE HEADACHE, UNSPECIFIED CHRONICITY PATTERN, UNSPECIFIED HEADACHE TYPE: ICD-10-CM

## 2018-11-21 DIAGNOSIS — G43.909 MIGRAINE WITHOUT STATUS MIGRAINOSUS, NOT INTRACTABLE, UNSPECIFIED MIGRAINE TYPE: ICD-10-CM

## 2018-11-21 DIAGNOSIS — G89.29 OTHER CHRONIC PAIN: ICD-10-CM

## 2018-11-21 DIAGNOSIS — M54.2 PAIN, NECK: Primary | ICD-10-CM

## 2018-11-21 DIAGNOSIS — R51.9 HEADACHE DISORDER: ICD-10-CM

## 2018-11-21 PROCEDURE — 20553 NJX 1/MLT TRIGGER POINTS 3/>: CPT | Performed by: PSYCHIATRY & NEUROLOGY

## 2018-11-21 RX ORDER — BUTALBITAL, ACETAMINOPHEN, CAFFEINE AND CODEINE PHOSPHATE 50; 325; 40; 30 MG/1; MG/1; MG/1; MG/1
1 CAPSULE ORAL EVERY 6 HOURS PRN
Qty: 90 CAPSULE | Refills: 5 | Status: SHIPPED | OUTPATIENT
Start: 2018-11-21 | End: 2019-05-14 | Stop reason: SDUPTHER

## 2018-11-21 RX ORDER — CARISOPRODOL 350 MG/1
350 TABLET ORAL 2 TIMES DAILY
Qty: 60 TABLET | Refills: 5 | Status: SHIPPED | OUTPATIENT
Start: 2018-11-21 | End: 2019-05-14 | Stop reason: SDUPTHER

## 2019-01-30 ENCOUNTER — TELEPHONE (OUTPATIENT)
Dept: NEUROLOGY | Age: 54
End: 2019-01-30

## 2019-02-06 ENCOUNTER — PROCEDURE VISIT (OUTPATIENT)
Dept: NEUROLOGY | Age: 54
End: 2019-02-06
Payer: MEDICARE

## 2019-02-06 VITALS
DIASTOLIC BLOOD PRESSURE: 74 MMHG | BODY MASS INDEX: 26.05 KG/M2 | WEIGHT: 147 LBS | SYSTOLIC BLOOD PRESSURE: 139 MMHG | HEART RATE: 81 BPM | HEIGHT: 63 IN

## 2019-02-06 DIAGNOSIS — R51.9 HEADACHE DISORDER: ICD-10-CM

## 2019-02-06 DIAGNOSIS — R51.9 NONINTRACTABLE HEADACHE, UNSPECIFIED CHRONICITY PATTERN, UNSPECIFIED HEADACHE TYPE: ICD-10-CM

## 2019-02-06 DIAGNOSIS — G89.29 OTHER CHRONIC PAIN: ICD-10-CM

## 2019-02-06 DIAGNOSIS — G43.909 MIGRAINE WITHOUT STATUS MIGRAINOSUS, NOT INTRACTABLE, UNSPECIFIED MIGRAINE TYPE: ICD-10-CM

## 2019-02-06 DIAGNOSIS — M54.2 PAIN, NECK: Primary | ICD-10-CM

## 2019-02-06 PROCEDURE — 20553 NJX 1/MLT TRIGGER POINTS 3/>: CPT | Performed by: PSYCHIATRY & NEUROLOGY

## 2019-03-06 ENCOUNTER — PROCEDURE VISIT (OUTPATIENT)
Dept: NEUROLOGY | Age: 54
End: 2019-03-06
Payer: MEDICARE

## 2019-03-06 VITALS
HEART RATE: 73 BPM | DIASTOLIC BLOOD PRESSURE: 83 MMHG | SYSTOLIC BLOOD PRESSURE: 122 MMHG | BODY MASS INDEX: 25.69 KG/M2 | WEIGHT: 145 LBS

## 2019-03-06 DIAGNOSIS — R51.9 HEADACHE DISORDER: ICD-10-CM

## 2019-03-06 DIAGNOSIS — M54.2 PAIN, NECK: Primary | ICD-10-CM

## 2019-03-06 DIAGNOSIS — R51.9 NONINTRACTABLE HEADACHE, UNSPECIFIED CHRONICITY PATTERN, UNSPECIFIED HEADACHE TYPE: ICD-10-CM

## 2019-03-06 DIAGNOSIS — G89.29 OTHER CHRONIC PAIN: ICD-10-CM

## 2019-03-06 PROCEDURE — 20553 NJX 1/MLT TRIGGER POINTS 3/>: CPT | Performed by: PSYCHIATRY & NEUROLOGY

## 2019-04-16 ENCOUNTER — PROCEDURE VISIT (OUTPATIENT)
Dept: NEUROLOGY | Age: 54
End: 2019-04-16
Payer: MEDICARE

## 2019-04-16 VITALS
OXYGEN SATURATION: 97 % | HEART RATE: 67 BPM | BODY MASS INDEX: 25.52 KG/M2 | HEIGHT: 63 IN | WEIGHT: 144 LBS | DIASTOLIC BLOOD PRESSURE: 81 MMHG | SYSTOLIC BLOOD PRESSURE: 139 MMHG

## 2019-04-16 DIAGNOSIS — G89.29 OTHER CHRONIC PAIN: ICD-10-CM

## 2019-04-16 DIAGNOSIS — R51.9 HEADACHE DISORDER: ICD-10-CM

## 2019-04-16 DIAGNOSIS — R51.9 NONINTRACTABLE HEADACHE, UNSPECIFIED CHRONICITY PATTERN, UNSPECIFIED HEADACHE TYPE: ICD-10-CM

## 2019-04-16 DIAGNOSIS — M54.2 PAIN, NECK: Primary | ICD-10-CM

## 2019-04-16 PROCEDURE — 20553 NJX 1/MLT TRIGGER POINTS 3/>: CPT | Performed by: PSYCHIATRY & NEUROLOGY

## 2019-04-16 NOTE — PROGRESS NOTES
pain No palpitations or significant leg swelling  Gastrointestinal - no abdominal swelling or pain. Genitourinary - No difficulty urinating, dysuria  Musculoskeletal - yes back pain or myalgia. Skin - no color change or rash  Neurologic - No seizures. No lateralizing weakness. Hematologic - no easy bruising or excessive bleeding. Psychiatric - yes severe anxiety or nervousness. All other review of systems are negative. Current Outpatient Medications   Medication Sig Dispense Refill    benztropine (COGENTIN) 1 MG tablet Take 1 mg by mouth daily      carisoprodol (SOMA) 350 MG tablet Take 350 mg by mouth 4 times daily as needed for Muscle spasms. Ritchie Lim fenofibrate 160 MG tablet   3    promethazine (PHENERGAN) 25 MG tablet   5    lisinopril (PRINIVIL;ZESTRIL) 5 MG tablet       ABILIFY 5 MG tablet       clonazePAM (KLONOPIN) 0.5 MG tablet 3 times daily as needed       CYMBALTA 60 MG capsule       estradiol (CLIMARA) 0.1 MG/24HR       PREMARIN 1.25 MG tablet       gabapentin (NEURONTIN) 800 MG tablet 3 times daily       zolpidem (AMBIEN CR) 12.5 MG CR tablet       PROMETHEGAN 25 MG suppository       pravastatin (PRAVACHOL) 20 MG tablet       levothyroxine (SYNTHROID) 88 MCG tablet        No current facility-administered medications for this visit. /81   Pulse 67   Ht 5' 3\" (1.6 m)   Wt 144 lb (65.3 kg)   SpO2 97%   BMI 25.51 kg/m²     Constitutional - well developed, well nourished. HENT - head normocephalic. Eyes - conjunctiva normal.  EOMS normal.  Neck- ROM appears normal, no tracheal deviation. Extremities -no edema     Musculoskeletal - ROM appears normal.  No significant edema. Skin - warm, dry, and intact. No rash, erythema, or pallor.   Psychiatric - mood, affect, and behavior appear normal.      Neurological exam  Awake, alert, fluent  appropriate affect  Speech normal without dysarthria    Cranial Nerve Exam    EOMI, No nystagmus, conjugate eye movements, no ptosis  Symmetric facies    Motor Exam  Antigravity throughout      Tremors- no tremors in hands or head noted    Gait  Normal base and speed  No ataxia      No results found for: [id number]  No results found for: WBC, HGB, HCT, MCV, PLT  No results found for: NA, K, CL, CO2, BUN, CREATININE, GLUCOSE, CALCIUM, PROT, LABALBU, BILITOT, ALKPHOS, AST, ALT, LABGLOM, GFRAA, AGRATIO, GLOB        Assessment    ICD-10-CM    1. Pain, neck M54.2 [place] OFFICE/OUTPT VISIT,PROCEDURE ONLY   2. Nonintractable headache, unspecified chronicity pattern, unspecified headache type R51    3. Other chronic pain G89.29    4. Headache disorder R51        Her neurological examination today was significant for some decreased light touch over the third and fourth fingers of her right hand. Based upon her history and examination, I suspect that much of her symptoms are related to musculoskeletal injury and strain. Her MRI of the brain, MRA of the head, MRI of the cervical and lumbosacral spine were unremarkable. Her EMG with nerve conduction study of her right arm and legs was consistent with a mild carpal tunnel syndrome. She was continued with Soma for her neck discomfort and referred to physical therapy. She was given a script for Flector patches to be applied to her neck or back to see if this provides any benefit. Today she had 10 cc of bupivacaine in to her bilateral occipital nerve and cervical regions. We discussed referral to pain management but she did not wish to pursue this. She did not wish to initiate any new medications. Her venous US of the legs were unremarkable. Her MRI of the C and LS spine was stable. The patient indicated understanding of the diagnosis and treatment plan. She is to follow up with me in approximately 4 weeks and call with any further problems. continue present care    Continue present care    Plan    Orders Placed This Encounter   Procedures    [place] OFFICE/OUTPT VISIT,PROCEDURE ONLY       No orders of the defined types were placed in this encounter. Return in about 1 month (around 5/14/2019).

## 2019-05-14 DIAGNOSIS — G89.29 OTHER CHRONIC PAIN: ICD-10-CM

## 2019-05-14 DIAGNOSIS — R51.9 NONINTRACTABLE HEADACHE, UNSPECIFIED CHRONICITY PATTERN, UNSPECIFIED HEADACHE TYPE: ICD-10-CM

## 2019-05-16 NOTE — TELEPHONE ENCOUNTER
Requested Prescriptions     Pending Prescriptions Disp Refills    acetaminophen-codeine (TYLENOL #3) 300-30 MG per tablet [Pharmacy Med Name: ACETAMINOPHEN-COD #3 TABLET 300-30 TAB] 60 tablet 5     Sig: Take 1 tablet by mouth every 6 hours as needed for Pain for up to 30 days. Must last 30 days    carisoprodol (SOMA) 350 MG tablet [Pharmacy Med Name: CARISOPRODOL 350 MG TABS 350 TAB] 60 tablet 5     Sig: TAKE ONE TABLET BY MOUTH TWO TIMES A DAY    butalbital-acetaminophen-caffeine-codeine (FIORICET WITH CODEINE) -24-30 MG per capsule [Pharmacy Med Name: [id number]-COD -86-3 CAP] 90 capsule 5     Sig: Take 1 capsule by mouth every 6 hours as needed for Headaches for up to 30 days.      Last OV  4/16/19  Next OV  5/22/19  Last Rx  Tylenol #3  11/13/18 with 5 refills     Soma - 11/21/18 with 5 refills     Fioricet - 11/21/18 with 5 refills  Jono Or   5/14/19          [facility]

## 2019-05-17 RX ORDER — BUTALBITAL, ACETAMINOPHEN, CAFFEINE AND CODEINE PHOSPHATE 50; 325; 40; 30 MG/1; MG/1; MG/1; MG/1
1 CAPSULE ORAL EVERY 6 HOURS PRN
Qty: 90 CAPSULE | Refills: 5 | Status: SHIPPED | OUTPATIENT
Start: 2019-05-17 | End: 2019-10-30 | Stop reason: SDUPTHER

## 2019-05-17 RX ORDER — ACETAMINOPHEN AND CODEINE PHOSPHATE 300; 30 MG/1; MG/1
1 TABLET ORAL EVERY 6 HOURS PRN
Qty: 60 TABLET | Refills: 5 | Status: SHIPPED | OUTPATIENT
Start: 2019-05-17 | End: 2019-11-07 | Stop reason: SDUPTHER

## 2019-05-17 RX ORDER — CARISOPRODOL 350 MG/1
TABLET ORAL
Qty: 60 TABLET | Refills: 5 | Status: SHIPPED | OUTPATIENT
Start: 2019-05-17 | End: 2019-11-07 | Stop reason: SDUPTHER

## 2019-05-22 ENCOUNTER — PROCEDURE VISIT (OUTPATIENT)
Dept: NEUROLOGY | Age: 54
End: 2019-05-22
Payer: MEDICARE

## 2019-05-22 VITALS
HEIGHT: 63 IN | BODY MASS INDEX: 24.98 KG/M2 | SYSTOLIC BLOOD PRESSURE: 123 MMHG | DIASTOLIC BLOOD PRESSURE: 85 MMHG | WEIGHT: 141 LBS | HEART RATE: 81 BPM

## 2019-05-22 DIAGNOSIS — M54.2 PAIN, NECK: ICD-10-CM

## 2019-05-22 DIAGNOSIS — G43.909 MIGRAINE WITHOUT STATUS MIGRAINOSUS, NOT INTRACTABLE, UNSPECIFIED MIGRAINE TYPE: ICD-10-CM

## 2019-05-22 DIAGNOSIS — R51.9 HEADACHE DISORDER: ICD-10-CM

## 2019-05-22 DIAGNOSIS — G89.29 OTHER CHRONIC PAIN: ICD-10-CM

## 2019-05-22 DIAGNOSIS — R51.9 NONINTRACTABLE HEADACHE, UNSPECIFIED CHRONICITY PATTERN, UNSPECIFIED HEADACHE TYPE: Primary | ICD-10-CM

## 2019-05-22 PROCEDURE — 20553 NJX 1/MLT TRIGGER POINTS 3/>: CPT | Performed by: PSYCHIATRY & NEUROLOGY

## 2019-05-22 NOTE — PROGRESS NOTES
HYSTERECTOMY      8 yrs ago TOTAL        Family History   Problem Relation Age of Onset   Linda Coon Cancer Mother         ovarian    Diabetes Mother     Cancer Father     High Blood Pressure Brother     Diabetes Maternal Grandmother     Cancer Other         bladder       No Known Allergies    Social History     Socioeconomic History    Marital status: Single     Spouse name: Not on file    Number of children: Not on file    Years of education: Not on file    Highest education level: Not on file   Occupational History    Not on file   Social Needs    Financial resource strain: Not on file    Food insecurity:     Worry: Not on file     Inability: Not on file    Transportation needs:     Medical: Not on file     Non-medical: Not on file   Tobacco Use    Smoking status: Current Every Day Smoker     Packs/day: 1.00     Years: 20.00     Pack years: 20.00    Smokeless tobacco: Never Used   Substance and Sexual Activity    Alcohol use: Yes     Comment: occasional     Drug use: No    Sexual activity: Never   Lifestyle    Physical activity:     Days per week: Not on file     Minutes per session: Not on file    Stress: Not on file   Relationships    Social connections:     Talks on phone: Not on file     Gets together: Not on file     Attends Yazdanism service: Not on file     Active member of club or organization: Not on file     Attends meetings of clubs or organizations: Not on file     Relationship status: Not on file    Intimate partner violence:     Fear of current or ex partner: Not on file     Emotionally abused: Not on file     Physically abused: Not on file     Forced sexual activity: Not on file   Other Topics Concern    Not on file   Social History Narrative    Not on file       Review of Systems     Constitutional - No fever or chills. No diaphoresis or significant fatigue. HENT -  No tinnitus or significant hearing loss.   Eyes - no sudden vision change or eye pain  Respiratory - no significant shortness of breath or cough  Cardiovascular - no chest pain No palpitations or significant leg swelling  Gastrointestinal - no abdominal swelling or pain. Genitourinary - No difficulty urinating, dysuria  Musculoskeletal - yes back pain or myalgia. Skin - no color change or rash  Neurologic - No seizures. No lateralizing weakness. Hematologic - no easy bruising or excessive bleeding. Psychiatric - yes severe anxiety or nervousness. All other review of systems are negative. Current Outpatient Medications   Medication Sig Dispense Refill    acetaminophen-codeine (TYLENOL #3) 300-30 MG per tablet Take 1 tablet by mouth every 6 hours as needed for Pain for up to 30 days. Must last 30 days 60 tablet 5    carisoprodol (SOMA) 350 MG tablet TAKE ONE TABLET BY MOUTH TWO TIMES A DAY 60 tablet 5    butalbital-acetaminophen-caffeine-codeine (FIORICET WITH CODEINE) -86-30 MG per capsule Take 1 capsule by mouth every 6 hours as needed for Headaches for up to 30 days. 90 capsule 5    benztropine (COGENTIN) 1 MG tablet Take 1 mg by mouth daily      fenofibrate 160 MG tablet   3    promethazine (PHENERGAN) 25 MG tablet   5    lisinopril (PRINIVIL;ZESTRIL) 5 MG tablet       ABILIFY 5 MG tablet       clonazePAM (KLONOPIN) 0.5 MG tablet 3 times daily as needed       CYMBALTA 60 MG capsule       estradiol (CLIMARA) 0.1 MG/24HR       PREMARIN 1.25 MG tablet       gabapentin (NEURONTIN) 800 MG tablet 3 times daily       zolpidem (AMBIEN CR) 12.5 MG CR tablet       PROMETHEGAN 25 MG suppository       pravastatin (PRAVACHOL) 20 MG tablet       levothyroxine (SYNTHROID) 88 MCG tablet        No current facility-administered medications for this visit. /85   Pulse 81   Ht 5' 3\" (1.6 m)   Wt 141 lb (64 kg)   BMI 24.98 kg/m²     Constitutional - well developed, well nourished. HENT - head normocephalic.   Eyes - conjunctiva normal.  EOMS normal.  Neck- ROM appears normal, no tracheal deviation. Extremities -no edema     Musculoskeletal - ROM appears normal.  No significant edema. Skin - warm, dry, and intact. No rash, erythema, or pallor. Psychiatric - mood, affect, and behavior appear normal.      Neurological exam  Awake, alert, fluent  appropriate affect  Speech normal without dysarthria    Cranial Nerve Exam    EOMI, No nystagmus, conjugate eye movements, no ptosis  Symmetric facies    Motor Exam  Antigravity throughout      Tremors- no tremors in hands or head noted    Gait  Normal base and speed  No ataxia      No results found for: [id number]  No results found for: WBC, HGB, HCT, MCV, PLT  No results found for: NA, K, CL, CO2, BUN, CREATININE, GLUCOSE, CALCIUM, PROT, LABALBU, BILITOT, ALKPHOS, AST, ALT, LABGLOM, GFRAA, AGRATIO, GLOB        Assessment    ICD-10-CM    1. Nonintractable headache, unspecified chronicity pattern, unspecified headache type R51    2. Other chronic pain G89.29    3. Pain, neck M54.2 CT OFFICE/OUTPT VISIT,PROCEDURE ONLY   4. Headache disorder R51    5. Migraine without status migrainosus, not intractable, unspecified migraine type G43.909        Her neurological examination today was significant for some decreased light touch over the third and fourth fingers of her right hand. Based upon her history and examination, I suspect that much of her symptoms are related to musculoskeletal injury and strain. Her MRI of the brain, MRA of the head, MRI of the cervical and lumbosacral spine were unremarkable. Her EMG with nerve conduction study of her right arm and legs was consistent with a mild carpal tunnel syndrome. She was continued with Soma for her neck discomfort and referred to physical therapy. She was given a script for Flector patches to be applied to her neck or back to see if this provides any benefit. Today she had 10 cc of bupivacaine in to her bilateral occipital nerve and cervical regions.  We discussed referral to pain management but she did not wish to pursue this. She did not wish to initiate any new medications. Her venous US of the legs were unremarkable. Her MRI of the C and LS spine was stable. The patient indicated understanding of the diagnosis and treatment plan. She is to follow up with me in approximately 4 weeks and call with any further problems. continue current care    Continue present care    Plan    Orders Placed This Encounter   Procedures    [place] OFFICE/OUTPT VISIT,PROCEDURE ONLY       No orders of the defined types were placed in this encounter. Return in about 1 month (around 6/19/2019).

## 2019-06-19 ENCOUNTER — APPOINTMENT (OUTPATIENT)
Dept: GENERAL RADIOLOGY | Facility: HOSPITAL | Age: 54
End: 2019-06-19

## 2019-06-19 ENCOUNTER — HOSPITAL ENCOUNTER (EMERGENCY)
Facility: HOSPITAL | Age: 54
Discharge: HOME | End: 2019-06-19
Admitting: EMERGENCY MEDICINE

## 2019-06-19 ENCOUNTER — APPOINTMENT (OUTPATIENT)
Dept: CT IMAGING | Facility: HOSPITAL | Age: 54
End: 2019-06-19

## 2019-06-19 VITALS
BODY MASS INDEX: 24.63 KG/M2 | WEIGHT: 139 LBS | TEMPERATURE: 98 F | RESPIRATION RATE: 19 BRPM | DIASTOLIC BLOOD PRESSURE: 64 MMHG | HEART RATE: 74 BPM | OXYGEN SATURATION: 99 % | SYSTOLIC BLOOD PRESSURE: 139 MMHG | HEIGHT: 63 IN

## 2019-06-19 DIAGNOSIS — R07.9 CHEST PAIN, UNSPECIFIED TYPE: Primary | ICD-10-CM

## 2019-06-19 LAB
ALBUMIN SERPL-MCNC: 4.7 G/DL (ref 3.5–5)
ALBUMIN/GLOB SERPL: 1.6 G/DL (ref 1.1–2.5)
ALP SERPL-CCNC: 101 U/L (ref 24–120)
ALT SERPL W P-5'-P-CCNC: 18 U/L (ref 0–54)
ANION GAP SERPL CALCULATED.3IONS-SCNC: 12 MMOL/L (ref 4–13)
AST SERPL-CCNC: 28 U/L (ref 7–45)
BILIRUB SERPL-MCNC: 0.3 MG/DL (ref 0.1–1)
BUN BLD-MCNC: 9 MG/DL (ref 5–21)
BUN/CREAT SERPL: 17.6 (ref 7–25)
CALCIUM SPEC-SCNC: 9.1 MG/DL (ref 8.4–10.4)
CHLORIDE SERPL-SCNC: 100 MMOL/L (ref 98–110)
CO2 SERPL-SCNC: 28 MMOL/L (ref 24–31)
CREAT BLD-MCNC: 0.51 MG/DL (ref 0.5–1.4)
D DIMER PPP FEU-MCNC: 0.58 MG/L (FEU) (ref 0–0.5)
DEPRECATED RDW RBC AUTO: 45.7 FL (ref 40–54)
EOSINOPHIL # BLD MANUAL: 0.11 10*3/MM3 (ref 0–0.7)
EOSINOPHIL NFR BLD MANUAL: 1 % (ref 0–4)
ERYTHROCYTE [DISTWIDTH] IN BLOOD BY AUTOMATED COUNT: 12.7 % (ref 12–15)
GFR SERPL CREATININE-BSD FRML MDRD: 127 ML/MIN/1.73
GLOBULIN UR ELPH-MCNC: 2.9 GM/DL
GLUCOSE BLD-MCNC: 146 MG/DL (ref 70–100)
HCT VFR BLD AUTO: 43.4 % (ref 37–47)
HGB BLD-MCNC: 14.7 G/DL (ref 12–16)
HOLD SPECIMEN: NORMAL
LIPASE SERPL-CCNC: 29 U/L (ref 23–203)
LYMPHOCYTES # BLD MANUAL: 5.11 10*3/MM3 (ref 0.72–4.86)
LYMPHOCYTES NFR BLD MANUAL: 3 % (ref 4–12)
LYMPHOCYTES NFR BLD MANUAL: 46 % (ref 15–45)
MCH RBC QN AUTO: 33.3 PG (ref 28–32)
MCHC RBC AUTO-ENTMCNC: 33.9 G/DL (ref 33–36)
MCV RBC AUTO: 98.2 FL (ref 82–98)
MONOCYTES # BLD AUTO: 0.33 10*3/MM3 (ref 0.19–1.3)
NEUTROPHILS # BLD AUTO: 5.22 10*3/MM3 (ref 1.87–8.4)
NEUTROPHILS NFR BLD MANUAL: 47 % (ref 39–78)
NRBC BLD AUTO-RTO: 0 /100 WBC (ref 0–0.2)
PLATELET # BLD AUTO: 297 10*3/MM3 (ref 130–400)
PMV BLD AUTO: 10.1 FL (ref 6–12)
POTASSIUM BLD-SCNC: 3.6 MMOL/L (ref 3.5–5.3)
PROT SERPL-MCNC: 7.6 G/DL (ref 6.3–8.7)
RBC # BLD AUTO: 4.42 10*6/MM3 (ref 4.2–5.4)
RBC MORPH BLD: NORMAL
SMALL PLATELETS BLD QL SMEAR: ADEQUATE
SODIUM BLD-SCNC: 140 MMOL/L (ref 135–145)
TROPONIN I SERPL-MCNC: <0.012 NG/ML (ref 0–0.03)
TROPONIN I SERPL-MCNC: <0.012 NG/ML (ref 0–0.03)
VARIANT LYMPHS NFR BLD MANUAL: 3 % (ref 0–5)
WBC MORPH BLD: NORMAL
WBC NRBC COR # BLD: 11.11 10*3/MM3 (ref 4.8–10.8)
WHOLE BLOOD HOLD SPECIMEN: NORMAL
WHOLE BLOOD HOLD SPECIMEN: NORMAL

## 2019-06-19 PROCEDURE — 83690 ASSAY OF LIPASE: CPT | Performed by: NURSE PRACTITIONER

## 2019-06-19 PROCEDURE — 93005 ELECTROCARDIOGRAM TRACING: CPT | Performed by: NURSE PRACTITIONER

## 2019-06-19 PROCEDURE — 71045 X-RAY EXAM CHEST 1 VIEW: CPT

## 2019-06-19 PROCEDURE — 85025 COMPLETE CBC W/AUTO DIFF WBC: CPT | Performed by: NURSE PRACTITIONER

## 2019-06-19 PROCEDURE — 0 IOPAMIDOL PER 1 ML: Performed by: NURSE PRACTITIONER

## 2019-06-19 PROCEDURE — 36415 COLL VENOUS BLD VENIPUNCTURE: CPT

## 2019-06-19 PROCEDURE — 80053 COMPREHEN METABOLIC PANEL: CPT | Performed by: NURSE PRACTITIONER

## 2019-06-19 PROCEDURE — 71275 CT ANGIOGRAPHY CHEST: CPT

## 2019-06-19 PROCEDURE — 93010 ELECTROCARDIOGRAM REPORT: CPT | Performed by: INTERNAL MEDICINE

## 2019-06-19 PROCEDURE — 25010000002 LORAZEPAM PER 2 MG: Performed by: NURSE PRACTITIONER

## 2019-06-19 PROCEDURE — 85007 BL SMEAR W/DIFF WBC COUNT: CPT | Performed by: NURSE PRACTITIONER

## 2019-06-19 PROCEDURE — 96374 THER/PROPH/DIAG INJ IV PUSH: CPT

## 2019-06-19 PROCEDURE — 93005 ELECTROCARDIOGRAM TRACING: CPT

## 2019-06-19 PROCEDURE — 84484 ASSAY OF TROPONIN QUANT: CPT | Performed by: NURSE PRACTITIONER

## 2019-06-19 PROCEDURE — 99284 EMERGENCY DEPT VISIT MOD MDM: CPT

## 2019-06-19 PROCEDURE — 85379 FIBRIN DEGRADATION QUANT: CPT | Performed by: NURSE PRACTITIONER

## 2019-06-19 RX ORDER — SODIUM CHLORIDE 0.9 % (FLUSH) 0.9 %
10 SYRINGE (ML) INJECTION AS NEEDED
Status: DISCONTINUED | OUTPATIENT
Start: 2019-06-19 | End: 2019-06-19 | Stop reason: HOSPADM

## 2019-06-19 RX ORDER — LORAZEPAM 2 MG/ML
0.5 INJECTION INTRAMUSCULAR ONCE
Status: COMPLETED | OUTPATIENT
Start: 2019-06-19 | End: 2019-06-19

## 2019-06-19 RX ORDER — ASPIRIN 81 MG/1
324 TABLET, CHEWABLE ORAL ONCE
Status: COMPLETED | OUTPATIENT
Start: 2019-06-19 | End: 2019-06-19

## 2019-06-19 RX ORDER — NITROGLYCERIN 0.4 MG/1
0.4 TABLET SUBLINGUAL
Status: DISCONTINUED | OUTPATIENT
Start: 2019-06-19 | End: 2019-06-19 | Stop reason: HOSPADM

## 2019-06-19 RX ADMIN — LORAZEPAM 0.5 MG: 2 INJECTION INTRAMUSCULAR; INTRAVENOUS at 19:12

## 2019-06-19 RX ADMIN — NITROGLYCERIN 0.4 MG: 0.4 TABLET SUBLINGUAL at 18:18

## 2019-06-19 RX ADMIN — ASPIRIN 81 MG 324 MG: 81 TABLET ORAL at 17:38

## 2019-06-19 RX ADMIN — IOPAMIDOL 79 ML: 755 INJECTION, SOLUTION INTRAVENOUS at 18:34

## 2019-06-26 ENCOUNTER — PROCEDURE VISIT (OUTPATIENT)
Dept: NEUROLOGY | Age: 54
End: 2019-06-26
Payer: MEDICARE

## 2019-06-26 VITALS
WEIGHT: 139 LBS | BODY MASS INDEX: 24.62 KG/M2 | HEART RATE: 72 BPM | DIASTOLIC BLOOD PRESSURE: 72 MMHG | SYSTOLIC BLOOD PRESSURE: 126 MMHG

## 2019-06-26 DIAGNOSIS — R51.9 NONINTRACTABLE HEADACHE, UNSPECIFIED CHRONICITY PATTERN, UNSPECIFIED HEADACHE TYPE: Primary | ICD-10-CM

## 2019-06-26 DIAGNOSIS — G43.909 MIGRAINE WITHOUT STATUS MIGRAINOSUS, NOT INTRACTABLE, UNSPECIFIED MIGRAINE TYPE: ICD-10-CM

## 2019-06-26 DIAGNOSIS — M54.2 PAIN, NECK: ICD-10-CM

## 2019-06-26 DIAGNOSIS — G89.29 OTHER CHRONIC PAIN: ICD-10-CM

## 2019-06-26 DIAGNOSIS — R51.9 HEADACHE DISORDER: ICD-10-CM

## 2019-06-26 PROCEDURE — 20553 NJX 1/MLT TRIGGER POINTS 3/>: CPT | Performed by: PSYCHIATRY & NEUROLOGY

## 2019-06-26 NOTE — PROGRESS NOTES
Review of Systems    Constitutional - No fever or chills. No diaphoresis or significant fatigue. HENT -  No tinnitus or significant hearing loss. Eyes - no sudden vision change or eye pain  Respiratory - no significant shortness of breath or cough  Cardiovascular - no chest pain No palpitations or significant leg swelling  Gastrointestinal - no abdominal swelling or pain. Genitourinary - No difficulty urinating, dysuria  Musculoskeletal - yes back pain or myalgia. Skin - no color change or rash  Neurologic - No seizures. No lateralizing weakness. Hematologic - no easy bruising or excessive bleeding. Psychiatric - yes severe anxiety or nervousness. All other review of systems are negative.

## 2019-06-26 NOTE — PROGRESS NOTES
Betito Lagunas is a 49y.o. year old female who is seen for evaluation of multiple complaints. 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 One day she was taking some hot items out of the oven when something burnt her and she turned her head quickly. She heard a popping noise and following this began to have significant neck pain. With time the pain he improved. Approximately 1 month ago she once again turned her head quickly and heard a popping noise. Her headaches return to once again. The headache involves the back of her head with no associated photophobia, phonophobia, or nausea. In addition she has migraines approximately once her week following her hysterectomy two years ago. These can last for up to two days. She has been tried on multiple medications in the past for migraine prophylaxis including propranolol, Topamax, and Elavil and Depakote. She complaints of numbness in the third and fourth fingers of her right hand over the last one month as well. She has significant neck and back pain. Since her last visit nerve block helps about 3-4 weeks. No acute issues since last visit. Chief complaint: neck pain  .     Active Ambulatory Problems     Diagnosis Date Noted    Breast lesion, right 11/22/2013    Breast mass, right 11/22/2013    Costochondral chest pain 11/22/2013    Pain, neck 06/13/2016    Migraine without status migrainosus, not intractable 06/13/2016    Headache disorder 06/13/2016    Chronic pain 07/20/2016    Voice hoarseness 01/25/2017    Nonintractable headache 05/22/2019     Resolved Ambulatory Problems     Diagnosis Date Noted    No Resolved Ambulatory Problems     Past Medical History:   Diagnosis Date    Chronic back pain     Depression     Fibromyalgia     Headache     Hyperlipidemia     Hypertension     Hypothyroidism     Neuropathy        Past Surgical History:   Procedure Laterality Date    APPENDECTOMY      age of [age]   [facility] HYSTERECTOMY      8 yrs ago TOTAL        Family History   Problem Relation Age of Onset   [place] Cancer Mother         ovarian    Diabetes Mother     Cancer Father     High Blood Pressure Brother     Diabetes Maternal Grandmother     Cancer Other         bladder       No Known Allergies    Social History     Socioeconomic History    Marital status: Single     Spouse name: Not on file    Number of children: Not on file    Years of education: Not on file    Highest education level: Not on file   Occupational History    Not on file   Social Needs    Financial resource strain: Not on file    Food insecurity:     Worry: Not on file     Inability: Not on file    Transportation needs:     Medical: Not on file     Non-medical: Not on file   Tobacco Use    Smoking status: Current Every Day Smoker     Packs/day: 1.00     Years: 20.00     Pack years: 20.00    Smokeless tobacco: Never Used   Substance and Sexual Activity    Alcohol use: Yes     Comment: occasional     Drug use: No    Sexual activity: Never   Lifestyle    Physical activity:     Days per week: Not on file     Minutes per session: Not on file    Stress: Not on file   Relationships    Social connections:     Talks on phone: Not on file     Gets together: Not on file     Attends Islam service: Not on file     Active member of club or organization: Not on file     Attends meetings of clubs or organizations: Not on file     Relationship status: Not on file    Intimate partner violence:     Fear of current or ex partner: Not on file     Emotionally abused: Not on file     Physically abused: Not on file     Forced sexual activity: Not on file   Other Topics Concern    Not on file   Social History Narrative    Not on file       Review of Systems     Constitutional - No fever or chills. No diaphoresis or significant fatigue. HENT -  No tinnitus or significant hearing loss.   Eyes - no sudden vision change or eye pain  Respiratory - no significant shortness of breath or cough  Cardiovascular - no chest pain No palpitations or significant leg swelling  Gastrointestinal - no abdominal swelling or pain. Genitourinary - No difficulty urinating, dysuria  Musculoskeletal - yes back pain or myalgia. Skin - no color change or rash  Neurologic - No seizures. No lateralizing weakness. Hematologic - no easy bruising or excessive bleeding. Psychiatric - yes severe anxiety or nervousness. All other review of systems are negative. Current Outpatient Medications   Medication Sig Dispense Refill    benztropine (COGENTIN) 1 MG tablet Take 1 mg by mouth daily      fenofibrate 160 MG tablet   3    promethazine (PHENERGAN) 25 MG tablet   5    lisinopril (PRINIVIL;ZESTRIL) 5 MG tablet       ABILIFY 5 MG tablet       clonazePAM (KLONOPIN) 0.5 MG tablet 3 times daily as needed       CYMBALTA 60 MG capsule       estradiol (CLIMARA) 0.1 MG/24HR       PREMARIN 1.25 MG tablet       gabapentin (NEURONTIN) 800 MG tablet 3 times daily       zolpidem (AMBIEN CR) 12.5 MG CR tablet       PROMETHEGAN 25 MG suppository       pravastatin (PRAVACHOL) 20 MG tablet       levothyroxine (SYNTHROID) 88 MCG tablet        No current facility-administered medications for this visit. /72   Pulse 72   Wt 139 lb (63 kg)   BMI 24.62 kg/m²     Constitutional - well developed, well nourished. HENT - head normocephalic. Eyes - conjunctiva normal.  EOMS normal.  Neck- ROM appears normal, no tracheal deviation. Extremities -no edema     Musculoskeletal - ROM appears normal.  No significant edema. Skin - warm, dry, and intact. No rash, erythema, or pallor.   Psychiatric - mood, affect, and behavior appear normal.      Neurological exam  Awake, alert, fluent  appropriate affect  Speech normal without dysarthria    Cranial Nerve Exam    EOMI, No nystagmus, conjugate eye movements, no ptosis  Symmetric facies    Motor Exam  Antigravity throughout      Tremors- no of the defined types were placed in this encounter. Return in about 1 month (around 7/24/2019).

## 2019-07-29 ENCOUNTER — HOSPITAL ENCOUNTER (OUTPATIENT)
Dept: PAIN MANAGEMENT | Age: 54
Discharge: HOME | End: 2019-07-29
Payer: MEDICARE

## 2019-07-29 VITALS
SYSTOLIC BLOOD PRESSURE: 131 MMHG | HEART RATE: 61 BPM | OXYGEN SATURATION: 91 % | TEMPERATURE: 98.5 F | DIASTOLIC BLOOD PRESSURE: 84 MMHG | RESPIRATION RATE: 61 BRPM

## 2019-07-29 PROCEDURE — 2500000003 HC RX 250 WO HCPCS

## 2019-07-29 PROCEDURE — 64405 NJX AA&/STRD GR OCPL NRV: CPT

## 2019-07-29 PROCEDURE — 20553 NJX 1/MLT TRIGGER POINTS 3/>: CPT | Performed by: PSYCHIATRY & NEUROLOGY

## 2019-07-29 RX ORDER — BUPIVACAINE HYDROCHLORIDE 2.5 MG/ML
10 INJECTION, SOLUTION EPIDURAL; INFILTRATION; INTRACAUDAL ONCE
Status: DISCONTINUED | OUTPATIENT
Start: 2019-07-29 | End: 2019-07-31 | Stop reason: HOSPADM

## 2019-08-26 ENCOUNTER — TELEPHONE (OUTPATIENT)
Dept: NEUROLOGY | Age: 54
End: 2019-08-26

## 2019-09-03 ENCOUNTER — TELEPHONE (OUTPATIENT)
Dept: NEUROLOGY | Age: 54
End: 2019-09-03

## 2019-09-03 NOTE — TELEPHONE ENCOUNTER
Called to inform the patient the prior authorization for Butalbital was approved from 6/5/2019-9/2/2020. See additional information in Media.

## 2019-09-09 ENCOUNTER — HOSPITAL ENCOUNTER (OUTPATIENT)
Dept: PAIN MANAGEMENT | Age: 54
Discharge: HOME | End: 2019-09-09
Payer: MEDICARE

## 2019-09-09 VITALS
HEART RATE: 77 BPM | OXYGEN SATURATION: 96 % | DIASTOLIC BLOOD PRESSURE: 55 MMHG | RESPIRATION RATE: 16 BRPM | TEMPERATURE: 97 F | SYSTOLIC BLOOD PRESSURE: 92 MMHG

## 2019-09-09 PROCEDURE — 20553 NJX 1/MLT TRIGGER POINTS 3/>: CPT | Performed by: PSYCHIATRY & NEUROLOGY

## 2019-09-09 PROCEDURE — 2500000003 HC RX 250 WO HCPCS

## 2019-09-09 PROCEDURE — 64405 NJX AA&/STRD GR OCPL NRV: CPT

## 2019-09-09 RX ORDER — BUPIVACAINE HYDROCHLORIDE 2.5 MG/ML
10 INJECTION, SOLUTION EPIDURAL; INFILTRATION; INTRACAUDAL ONCE
Status: DISCONTINUED | OUTPATIENT
Start: 2019-09-09 | End: 2019-09-11 | Stop reason: HOSPADM

## 2019-09-23 ENCOUNTER — HOSPITAL ENCOUNTER (OUTPATIENT)
Dept: PAIN MANAGEMENT | Age: 54
Discharge: HOME | End: 2019-09-23
Payer: MEDICARE

## 2019-09-23 VITALS
HEART RATE: 71 BPM | RESPIRATION RATE: 20 BRPM | TEMPERATURE: 97.4 F | DIASTOLIC BLOOD PRESSURE: 91 MMHG | OXYGEN SATURATION: 91 % | SYSTOLIC BLOOD PRESSURE: 157 MMHG

## 2019-09-23 PROCEDURE — 64405 NJX AA&/STRD GR OCPL NRV: CPT

## 2019-09-23 PROCEDURE — 2500000003 HC RX 250 WO HCPCS

## 2019-09-23 PROCEDURE — 20553 NJX 1/MLT TRIGGER POINTS 3/>: CPT | Performed by: PSYCHIATRY & NEUROLOGY

## 2019-09-23 RX ORDER — BUPIVACAINE HYDROCHLORIDE 2.5 MG/ML
5 INJECTION, SOLUTION EPIDURAL; INFILTRATION; INTRACAUDAL ONCE
Status: DISCONTINUED | OUTPATIENT
Start: 2019-09-23 | End: 2019-09-25 | Stop reason: HOSPADM

## 2019-09-23 NOTE — PROCEDURES
AT today's visit the patient was given 5 cc of 0.25% bupivacaine into the bilateral cervical and occipital regions. They are to follow up on 11/18 for repeat nerve block if effective. The patient tolerated the procedure with no complications. They are to call with any issues.

## 2019-10-30 DIAGNOSIS — R51.9 NONINTRACTABLE HEADACHE, UNSPECIFIED CHRONICITY PATTERN, UNSPECIFIED HEADACHE TYPE: ICD-10-CM

## 2019-10-30 DIAGNOSIS — G89.29 OTHER CHRONIC PAIN: ICD-10-CM

## 2019-10-31 RX ORDER — BUTALBITAL, ACETAMINOPHEN, CAFFEINE AND CODEINE PHOSPHATE 50; 325; 40; 30 MG/1; MG/1; MG/1; MG/1
CAPSULE ORAL
Qty: 90 CAPSULE | Refills: 5 | Status: SHIPPED | OUTPATIENT
Start: 2019-10-31 | End: 2020-03-16 | Stop reason: SDUPTHER

## 2019-11-07 DIAGNOSIS — G89.29 OTHER CHRONIC PAIN: ICD-10-CM

## 2019-11-07 DIAGNOSIS — R51.9 NONINTRACTABLE HEADACHE, UNSPECIFIED CHRONICITY PATTERN, UNSPECIFIED HEADACHE TYPE: ICD-10-CM

## 2019-11-07 RX ORDER — ACETAMINOPHEN AND CODEINE PHOSPHATE 300; 30 MG/1; MG/1
1 TABLET ORAL EVERY 6 HOURS PRN
Qty: 60 TABLET | Refills: 5 | Status: SHIPPED | OUTPATIENT
Start: 2019-11-07 | End: 2020-04-30

## 2019-11-07 RX ORDER — CARISOPRODOL 350 MG/1
TABLET ORAL
Qty: 60 TABLET | Refills: 5 | Status: SHIPPED | OUTPATIENT
Start: 2019-11-07 | End: 2020-03-16 | Stop reason: SDUPTHER

## 2019-12-02 ENCOUNTER — TELEPHONE (OUTPATIENT)
Dept: NEUROLOGY | Age: 54
End: 2019-12-02

## 2019-12-02 NOTE — TELEPHONE ENCOUNTER
Patient has a nerve block that she is needing to r/s. Lorena Bojorquez requests that office return their call. The best time to reach her is Anytime. Thank you.

## 2020-01-13 ENCOUNTER — HOSPITAL ENCOUNTER (OUTPATIENT)
Dept: PAIN MANAGEMENT | Age: 55
Discharge: HOME | End: 2020-01-13
Payer: MEDICARE

## 2020-01-13 VITALS
HEART RATE: 78 BPM | OXYGEN SATURATION: 91 % | SYSTOLIC BLOOD PRESSURE: 148 MMHG | TEMPERATURE: 97 F | DIASTOLIC BLOOD PRESSURE: 80 MMHG | RESPIRATION RATE: 20 BRPM

## 2020-01-13 PROCEDURE — 64405 NJX AA&/STRD GR OCPL NRV: CPT

## 2020-01-13 PROCEDURE — 2500000003 HC RX 250 WO HCPCS

## 2020-01-13 PROCEDURE — 64405 NJX AA&/STRD GR OCPL NRV: CPT | Performed by: PSYCHIATRY & NEUROLOGY

## 2020-01-13 RX ORDER — BUPIVACAINE HYDROCHLORIDE 2.5 MG/ML
5 INJECTION, SOLUTION EPIDURAL; INFILTRATION; INTRACAUDAL ONCE
Status: DISCONTINUED | OUTPATIENT
Start: 2020-01-13 | End: 2020-01-15 | Stop reason: HOSPADM

## 2020-01-13 NOTE — PROCEDURES
AT today's visit the patient was given 5 cc of 0.25% bupivacaine into the bilateral cervical and occipital regions. They are to follow up in 4 weeks for repeat nerve block if effective. The patient tolerated the procedure with no complications. They are to call with any issues.

## 2020-02-10 ENCOUNTER — HOSPITAL ENCOUNTER (OUTPATIENT)
Dept: PAIN MANAGEMENT | Age: 55
Discharge: HOME | End: 2020-02-10
Payer: MEDICARE

## 2020-02-10 VITALS
OXYGEN SATURATION: 99 % | RESPIRATION RATE: 18 BRPM | TEMPERATURE: 97.6 F | DIASTOLIC BLOOD PRESSURE: 88 MMHG | SYSTOLIC BLOOD PRESSURE: 162 MMHG | HEART RATE: 83 BPM

## 2020-02-10 PROCEDURE — 2500000003 HC RX 250 WO HCPCS

## 2020-02-10 PROCEDURE — 64405 NJX AA&/STRD GR OCPL NRV: CPT

## 2020-02-10 PROCEDURE — 64405 NJX AA&/STRD GR OCPL NRV: CPT | Performed by: PSYCHIATRY & NEUROLOGY

## 2020-02-10 RX ORDER — BUPIVACAINE HYDROCHLORIDE 2.5 MG/ML
5 INJECTION, SOLUTION EPIDURAL; INFILTRATION; INTRACAUDAL ONCE
Status: DISCONTINUED | OUTPATIENT
Start: 2020-02-10 | End: 2020-02-12 | Stop reason: HOSPADM

## 2020-03-16 ENCOUNTER — HOSPITAL ENCOUNTER (OUTPATIENT)
Dept: PAIN MANAGEMENT | Age: 55
Discharge: HOME | End: 2020-03-16
Payer: MEDICARE

## 2020-03-16 VITALS
HEART RATE: 114 BPM | OXYGEN SATURATION: 91 % | DIASTOLIC BLOOD PRESSURE: 71 MMHG | TEMPERATURE: 97.8 F | RESPIRATION RATE: 18 BRPM | SYSTOLIC BLOOD PRESSURE: 128 MMHG

## 2020-03-16 PROCEDURE — 64405 NJX AA&/STRD GR OCPL NRV: CPT

## 2020-03-16 PROCEDURE — 64405 NJX AA&/STRD GR OCPL NRV: CPT | Performed by: PSYCHIATRY & NEUROLOGY

## 2020-03-16 PROCEDURE — 2500000003 HC RX 250 WO HCPCS

## 2020-03-16 RX ORDER — BUPIVACAINE HYDROCHLORIDE 2.5 MG/ML
10 INJECTION, SOLUTION EPIDURAL; INFILTRATION; INTRACAUDAL ONCE
Status: DISCONTINUED | OUTPATIENT
Start: 2020-03-16 | End: 2020-03-18 | Stop reason: HOSPADM

## 2020-03-16 RX ORDER — BUTALBITAL, ACETAMINOPHEN, CAFFEINE AND CODEINE PHOSPHATE 50; 325; 40; 30 MG/1; MG/1; MG/1; MG/1
CAPSULE ORAL
Qty: 120 CAPSULE | Refills: 5 | Status: SHIPPED | OUTPATIENT
Start: 2020-03-16 | End: 2020-09-01

## 2020-03-16 RX ORDER — CARISOPRODOL 350 MG/1
TABLET ORAL
Qty: 60 TABLET | Refills: 5 | Status: SHIPPED | OUTPATIENT
Start: 2020-03-16 | End: 2020-08-10 | Stop reason: SDUPTHER

## 2020-03-16 NOTE — PROGRESS NOTES
Procedure:  Level of Consciousness: [x]Alert [x]Oriented []Disoriented []Lethargic  Anxiety Level: [x]Calm []Anxious []Depressed []Other  Skin: [x]Warm [x]Dry []Cool []Moist []Intact []Other  Cardiovascular: []Palpitations: []Never []Occasionally []Frequently  Chest Pain: [x]No []Yes  Respiratory:  []Unlabored []Labored []Cough ([] Productive []Unproductive)  HCG Required: []No []Yes   Results: []Negative []Positive  Knowledge Level:        [x]Patient/Other verbalized understanding of pre-procedure instructions. []Assessment of post-op care needs (transportation, responsible caregiver)        []Able to discuss health care problems and how to deal with it.   Factors that Affect Teaching:        Language Barrier: [x]No []Yes - why:        Hearing Loss:        [x]No []Yes            Corrective Device:  []Yes [x]No        Vision Loss:           [x]No []Yes            Corrective Device:  []Yes [x]No        Memory Loss:       [x]No []Yes            []Short Term []Long Term  Motivational Level:  [x]Asks Questions                  []Extremely Anxious       []Seems Interested               []Seems Uninterested                  []Denies need for Education  Risk for Injury:  [x]Patient oriented to person, place and time  []History of frequent falls/loss of balance  Nutritional:  []Change in appetite   []Weight Gain   []Weight Loss  Functional:  []Requires assistance with ADL's

## 2020-06-08 ENCOUNTER — HOSPITAL ENCOUNTER (OUTPATIENT)
Dept: PAIN MANAGEMENT | Age: 55
Discharge: HOME | End: 2020-06-08
Payer: MEDICARE

## 2020-06-08 VITALS — RESPIRATION RATE: 18 BRPM | SYSTOLIC BLOOD PRESSURE: 169 MMHG | TEMPERATURE: 97.3 F | DIASTOLIC BLOOD PRESSURE: 104 MMHG

## 2020-06-08 PROCEDURE — 2500000003 HC RX 250 WO HCPCS

## 2020-06-08 PROCEDURE — 64405 NJX AA&/STRD GR OCPL NRV: CPT

## 2020-06-08 PROCEDURE — 64450 NJX AA&/STRD OTHER PN/BRANCH: CPT | Performed by: PSYCHIATRY & NEUROLOGY

## 2020-06-08 PROCEDURE — 64405 NJX AA&/STRD GR OCPL NRV: CPT | Performed by: PSYCHIATRY & NEUROLOGY

## 2020-06-08 RX ORDER — BUPIVACAINE HYDROCHLORIDE 2.5 MG/ML
5 INJECTION, SOLUTION EPIDURAL; INFILTRATION; INTRACAUDAL ONCE
Status: DISCONTINUED | OUTPATIENT
Start: 2020-06-08 | End: 2020-06-10 | Stop reason: HOSPADM

## 2020-08-10 ENCOUNTER — HOSPITAL ENCOUNTER (OUTPATIENT)
Dept: PAIN MANAGEMENT | Age: 55
Discharge: HOME | End: 2020-08-10
Payer: MEDICARE

## 2020-08-10 VITALS
SYSTOLIC BLOOD PRESSURE: 107 MMHG | WEIGHT: 135 LBS | RESPIRATION RATE: 18 BRPM | TEMPERATURE: 98.9 F | OXYGEN SATURATION: 92 % | BODY MASS INDEX: 23.92 KG/M2 | DIASTOLIC BLOOD PRESSURE: 91 MMHG | HEIGHT: 63 IN | HEART RATE: 64 BPM

## 2020-08-10 PROCEDURE — 64405 NJX AA&/STRD GR OCPL NRV: CPT

## 2020-08-10 PROCEDURE — 64450 NJX AA&/STRD OTHER PN/BRANCH: CPT | Performed by: PSYCHIATRY & NEUROLOGY

## 2020-08-10 PROCEDURE — 64405 NJX AA&/STRD GR OCPL NRV: CPT | Performed by: PSYCHIATRY & NEUROLOGY

## 2020-08-10 PROCEDURE — 2500000003 HC RX 250 WO HCPCS

## 2020-08-10 RX ORDER — BUPIVACAINE HYDROCHLORIDE 2.5 MG/ML
5 INJECTION, SOLUTION EPIDURAL; INFILTRATION; INTRACAUDAL ONCE
Status: DISCONTINUED | OUTPATIENT
Start: 2020-08-10 | End: 2020-08-12 | Stop reason: HOSPADM

## 2020-08-10 RX ORDER — CARISOPRODOL 350 MG/1
TABLET ORAL
Qty: 60 TABLET | Refills: 5 | Status: SHIPPED | OUTPATIENT
Start: 2020-08-10 | End: 2021-02-08 | Stop reason: SDUPTHER

## 2020-08-10 NOTE — PROGRESS NOTES
Procedure:  Level of Consciousness: [x]Alert [x]Oriented []Disoriented []Lethargic  Anxiety Level: [x]Calm [x]Anxious []Depressed []Other  Skin: [x]Warm [x]Dry []Cool []Moist []Intact []Other  Cardiovascular: [x]Palpitations: [x]Never []Occasionally []Frequently  Chest Pain: [x]No []Yes  Respiratory:  [x]Unlabored []Labored []Cough ([] Productive []Unproductive)  HCG Required: []No []Yes   Results: []Negative []Positive  Knowledge Level:        [x]Patient/Other verbalized understanding of pre-procedure instructions. [x]Assessment of post-op care needs (transportation, responsible caregiver)        [x]Able to discuss health care problems and how to deal with it.   Factors that Affect Teaching:        Language Barrier: [x]No []Yes - why:        Hearing Loss:        [x]No []Yes            Corrective Device:  []Yes []No        Vision Loss:           [x]No []Yes            Corrective Device:  []Yes []No        Memory Loss:       [x]No []Yes            []Short Term []Long Term  Motivational Level:  [x]Asks Questions                  []Extremely Anxious       [x]Seems Interested               []Seems Uninterested                  [x]Denies need for Education  Risk for Injury:  [x]Patient oriented to person, place and time  []History of frequent falls/loss of balance  Nutritional:  []Change in appetite   []Weight Gain   []Weight Loss  Functional:  []Requires assistance with ADL's

## 2020-09-01 RX ORDER — BUTALBITAL, ACETAMINOPHEN, CAFFEINE AND CODEINE PHOSPHATE 50; 325; 40; 30 MG/1; MG/1; MG/1; MG/1
CAPSULE ORAL
Qty: 120 CAPSULE | Refills: 5 | Status: SHIPPED | OUTPATIENT
Start: 2020-09-10 | End: 2021-02-08 | Stop reason: SDUPTHER

## 2020-09-21 ENCOUNTER — HOSPITAL ENCOUNTER (OUTPATIENT)
Dept: PAIN MANAGEMENT | Age: 55
Discharge: HOME | End: 2020-09-21
Payer: MEDICARE

## 2020-09-21 VITALS
TEMPERATURE: 97.1 F | HEART RATE: 89 BPM | DIASTOLIC BLOOD PRESSURE: 89 MMHG | SYSTOLIC BLOOD PRESSURE: 185 MMHG | RESPIRATION RATE: 18 BRPM | OXYGEN SATURATION: 93 %

## 2020-09-21 PROCEDURE — 2500000003 HC RX 250 WO HCPCS

## 2020-09-21 PROCEDURE — 64450 NJX AA&/STRD OTHER PN/BRANCH: CPT | Performed by: PSYCHIATRY & NEUROLOGY

## 2020-09-21 PROCEDURE — 64405 NJX AA&/STRD GR OCPL NRV: CPT | Performed by: PSYCHIATRY & NEUROLOGY

## 2020-09-21 PROCEDURE — 64405 NJX AA&/STRD GR OCPL NRV: CPT

## 2020-09-21 RX ORDER — BUPIVACAINE HYDROCHLORIDE 2.5 MG/ML
5 INJECTION, SOLUTION EPIDURAL; INFILTRATION; INTRACAUDAL ONCE
Status: DISCONTINUED | OUTPATIENT
Start: 2020-09-21 | End: 2020-09-23 | Stop reason: HOSPADM

## 2020-09-21 ASSESSMENT — PAIN DESCRIPTION - PAIN TYPE: TYPE: CHRONIC PAIN

## 2020-09-21 ASSESSMENT — PAIN DESCRIPTION - LOCATION: LOCATION: NECK;SHOULDER

## 2020-09-21 ASSESSMENT — PAIN DESCRIPTION - ORIENTATION: ORIENTATION: RIGHT

## 2020-09-21 ASSESSMENT — PAIN DESCRIPTION - FREQUENCY: FREQUENCY: CONTINUOUS

## 2020-09-21 ASSESSMENT — PAIN - FUNCTIONAL ASSESSMENT: PAIN_FUNCTIONAL_ASSESSMENT: PREVENTS OR INTERFERES SOME ACTIVE ACTIVITIES AND ADLS

## 2020-09-21 ASSESSMENT — PAIN DESCRIPTION - ONSET: ONSET: AWAKENED FROM SLEEP

## 2020-09-21 ASSESSMENT — PAIN DESCRIPTION - DESCRIPTORS: DESCRIPTORS: ACHING;HEADACHE

## 2020-09-21 ASSESSMENT — PAIN DESCRIPTION - PROGRESSION: CLINICAL_PROGRESSION: GRADUALLY WORSENING

## 2020-09-21 ASSESSMENT — PAIN SCALES - GENERAL: PAINLEVEL_OUTOF10: 7

## 2020-09-21 NOTE — PROCEDURES
At today's visit the patient was given 5 cc of 0.25% bupivacaine into the bilateral cervical and occipital regions. They are to follow up in 4 weeks for repeat nerve block if effective. The patient tolerated the procedure with no complications. They are to call with any issues for  neck pain and headaches.

## 2020-11-16 ENCOUNTER — HOSPITAL ENCOUNTER (OUTPATIENT)
Dept: PAIN MANAGEMENT | Age: 55
Discharge: HOME | End: 2020-11-16
Payer: MEDICARE

## 2020-11-16 VITALS
RESPIRATION RATE: 18 BRPM | OXYGEN SATURATION: 94 % | HEART RATE: 69 BPM | DIASTOLIC BLOOD PRESSURE: 81 MMHG | TEMPERATURE: 98.1 F | SYSTOLIC BLOOD PRESSURE: 149 MMHG

## 2020-11-16 PROCEDURE — 64450 NJX AA&/STRD OTHER PN/BRANCH: CPT | Performed by: PSYCHIATRY & NEUROLOGY

## 2020-11-16 PROCEDURE — 2500000003 HC RX 250 WO HCPCS

## 2020-11-16 PROCEDURE — 64405 NJX AA&/STRD GR OCPL NRV: CPT | Performed by: PSYCHIATRY & NEUROLOGY

## 2020-11-16 PROCEDURE — 64405 NJX AA&/STRD GR OCPL NRV: CPT

## 2020-11-16 ASSESSMENT — PAIN DESCRIPTION - ORIENTATION: ORIENTATION: MID

## 2020-11-16 ASSESSMENT — PAIN DESCRIPTION - DESCRIPTORS: DESCRIPTORS: CONSTANT

## 2020-11-16 ASSESSMENT — PAIN DESCRIPTION - FREQUENCY: FREQUENCY: CONTINUOUS

## 2020-11-16 ASSESSMENT — PAIN SCALES - GENERAL: PAINLEVEL_OUTOF10: 7

## 2020-11-16 ASSESSMENT — PAIN DESCRIPTION - ONSET: ONSET: AWAKENED FROM SLEEP

## 2020-11-16 ASSESSMENT — PAIN DESCRIPTION - PAIN TYPE: TYPE: CHRONIC PAIN

## 2020-11-16 ASSESSMENT — PAIN - FUNCTIONAL ASSESSMENT: PAIN_FUNCTIONAL_ASSESSMENT: PREVENTS OR INTERFERES SOME ACTIVE ACTIVITIES AND ADLS

## 2020-11-16 ASSESSMENT — PAIN DESCRIPTION - PROGRESSION: CLINICAL_PROGRESSION: GRADUALLY WORSENING

## 2020-11-16 ASSESSMENT — PAIN DESCRIPTION - LOCATION: LOCATION: NECK;HEAD;BACK

## 2020-12-21 ENCOUNTER — HOSPITAL ENCOUNTER (OUTPATIENT)
Dept: PAIN MANAGEMENT | Age: 55
Discharge: HOME | End: 2020-12-21
Payer: MEDICARE

## 2020-12-21 VITALS
RESPIRATION RATE: 18 BRPM | TEMPERATURE: 97.8 F | OXYGEN SATURATION: 94 % | SYSTOLIC BLOOD PRESSURE: 154 MMHG | HEART RATE: 71 BPM | DIASTOLIC BLOOD PRESSURE: 94 MMHG

## 2020-12-21 PROCEDURE — 2500000003 HC RX 250 WO HCPCS

## 2020-12-21 PROCEDURE — 64450 NJX AA&/STRD OTHER PN/BRANCH: CPT | Performed by: PSYCHIATRY & NEUROLOGY

## 2020-12-21 PROCEDURE — 64405 NJX AA&/STRD GR OCPL NRV: CPT | Performed by: PSYCHIATRY & NEUROLOGY

## 2020-12-21 PROCEDURE — 64405 NJX AA&/STRD GR OCPL NRV: CPT

## 2020-12-21 RX ORDER — BUPIVACAINE HYDROCHLORIDE 2.5 MG/ML
5 INJECTION, SOLUTION EPIDURAL; INFILTRATION; INTRACAUDAL ONCE
Status: DISCONTINUED | OUTPATIENT
Start: 2020-12-21 | End: 2020-12-23 | Stop reason: HOSPADM

## 2020-12-21 NOTE — PROGRESS NOTES

## 2021-02-02 ENCOUNTER — TELEPHONE (OUTPATIENT)
Dept: NEUROLOGY | Age: 56
End: 2021-02-02

## 2021-02-02 DIAGNOSIS — G89.29 OTHER CHRONIC PAIN: ICD-10-CM

## 2021-02-02 DIAGNOSIS — R51.9 NONINTRACTABLE HEADACHE, UNSPECIFIED CHRONICITY PATTERN, UNSPECIFIED HEADACHE TYPE: ICD-10-CM

## 2021-02-02 RX ORDER — BUTALBITAL, ACETAMINOPHEN, CAFFEINE AND CODEINE PHOSPHATE 50; 325; 40; 30 MG/1; MG/1; MG/1; MG/1
CAPSULE ORAL
Qty: 92 CAPSULE | Refills: 5 | OUTPATIENT
Start: 2021-02-02

## 2021-02-02 NOTE — TELEPHONE ENCOUNTER
Requested medication last refilled 9/10/2020 with 5 additional refills. Patient should not be out of refills and in need of a new script until March 2021. Based on the Jd Grover she is filling more frequently than every 30 days on this medication. She is prescribed #120 for a 30 days supply. Patient also has not been seen in our office for a routine follow up since 11/21/2018, she has kept pain management appts only since that time.

## 2021-02-02 NOTE — TELEPHONE ENCOUNTER
----- Message from Ranjeet Freed sent at 2/2/2021  1:55 PM CST -----  This patient needs to schedule and keep an appointment to f/u with Dr Shraddha Nieves down here in our office please. She's only been going to pain management to get injections, she has to follow up down here with us to keep getting controlled medications from our office. Thank you!

## 2021-02-03 ENCOUNTER — TELEPHONE (OUTPATIENT)
Dept: NEUROLOGY | Age: 56
End: 2021-02-03

## 2021-02-08 ENCOUNTER — HOSPITAL ENCOUNTER (OUTPATIENT)
Dept: PAIN MANAGEMENT | Age: 56
Discharge: HOME | End: 2021-02-08
Payer: MEDICARE

## 2021-02-08 VITALS
DIASTOLIC BLOOD PRESSURE: 98 MMHG | SYSTOLIC BLOOD PRESSURE: 180 MMHG | TEMPERATURE: 97.3 F | RESPIRATION RATE: 18 BRPM | HEART RATE: 94 BPM | OXYGEN SATURATION: 95 %

## 2021-02-08 DIAGNOSIS — R51.9 NONINTRACTABLE HEADACHE, UNSPECIFIED CHRONICITY PATTERN, UNSPECIFIED HEADACHE TYPE: ICD-10-CM

## 2021-02-08 DIAGNOSIS — G89.29 OTHER CHRONIC PAIN: ICD-10-CM

## 2021-02-08 PROCEDURE — 64405 NJX AA&/STRD GR OCPL NRV: CPT

## 2021-02-08 PROCEDURE — 64405 NJX AA&/STRD GR OCPL NRV: CPT | Performed by: PSYCHIATRY & NEUROLOGY

## 2021-02-08 PROCEDURE — 64450 NJX AA&/STRD OTHER PN/BRANCH: CPT | Performed by: PSYCHIATRY & NEUROLOGY

## 2021-02-08 PROCEDURE — 2500000003 HC RX 250 WO HCPCS

## 2021-02-08 RX ORDER — CARISOPRODOL 350 MG/1
TABLET ORAL
Qty: 60 TABLET | Refills: 5 | Status: SHIPPED | OUTPATIENT
Start: 2021-02-08 | End: 2021-03-10

## 2021-02-08 RX ORDER — BUTALBITAL, ACETAMINOPHEN, CAFFEINE AND CODEINE PHOSPHATE 50; 325; 40; 30 MG/1; MG/1; MG/1; MG/1
CAPSULE ORAL
Qty: 120 CAPSULE | Refills: 5 | Status: SHIPPED | OUTPATIENT
Start: 2021-02-08 | End: 2021-07-26 | Stop reason: SDUPTHER

## 2021-02-08 RX ORDER — BUPIVACAINE HYDROCHLORIDE 2.5 MG/ML
5 INJECTION, SOLUTION EPIDURAL; INFILTRATION; INTRACAUDAL ONCE
Status: DISCONTINUED | OUTPATIENT
Start: 2021-02-08 | End: 2021-02-10 | Stop reason: HOSPADM

## 2021-02-08 ASSESSMENT — PAIN - FUNCTIONAL ASSESSMENT: PAIN_FUNCTIONAL_ASSESSMENT: PREVENTS OR INTERFERES SOME ACTIVE ACTIVITIES AND ADLS

## 2021-02-08 ASSESSMENT — PAIN DESCRIPTION - ONSET: ONSET: AWAKENED FROM SLEEP

## 2021-02-08 ASSESSMENT — PAIN SCALES - GENERAL: PAINLEVEL_OUTOF10: 7

## 2021-02-08 ASSESSMENT — PAIN DESCRIPTION - LOCATION: LOCATION: NECK

## 2021-02-08 ASSESSMENT — PAIN DESCRIPTION - PAIN TYPE: TYPE: CHRONIC PAIN

## 2021-02-17 ENCOUNTER — HOSPITAL ENCOUNTER (EMERGENCY)
Facility: HOSPITAL | Age: 56
Discharge: HOME | End: 2021-02-17
Attending: EMERGENCY MEDICINE | Admitting: EMERGENCY MEDICINE

## 2021-02-17 VITALS
HEIGHT: 63 IN | HEART RATE: 117 BPM | OXYGEN SATURATION: 97 % | BODY MASS INDEX: 21.62 KG/M2 | TEMPERATURE: 98.8 F | RESPIRATION RATE: 20 BRPM | SYSTOLIC BLOOD PRESSURE: 136 MMHG | DIASTOLIC BLOOD PRESSURE: 90 MMHG | WEIGHT: 122 LBS

## 2021-02-17 DIAGNOSIS — Z00.00 WELL ADULT EXAM: Primary | ICD-10-CM

## 2021-02-17 PROCEDURE — 99282 EMERGENCY DEPT VISIT SF MDM: CPT

## 2021-02-17 RX ORDER — CODEINE/BUTALBITAL/ASA/CAFFEIN 30-50-325
1 CAPSULE ORAL EVERY 4 HOURS PRN
COMMUNITY

## 2021-02-17 RX ORDER — LISINOPRIL 5 MG/1
5 TABLET ORAL DAILY
COMMUNITY

## 2021-02-17 RX ORDER — CARISOPRODOL 350 MG/1
350 TABLET ORAL 2 TIMES DAILY
COMMUNITY

## 2021-02-17 NOTE — ED PROVIDER NOTES
Subjective   Patient is a 55-year-old female who presents to the ER for a mass.  Patient states she palpated a mass in her upper epigastric/lower chest region approximately 7 to 8 months ago.  Patient states that the mass has remained.  Patient states her daughter encouraged her to be evaluated today.  Patient states that sometimes it feels sore.  She denies any redness, fever or trauma.  She denies any chest pain, shortness of air, abdominal pain, nausea vomiting diarrhea, urinary changes, neurologic changes.  Patient has no other concerns.          Review of Systems   Constitutional: Negative.    HENT: Negative.    Eyes: Negative.    Respiratory: Negative.    Cardiovascular: Negative.    Gastrointestinal: Negative.    Endocrine: Negative.    Genitourinary: Negative.    Musculoskeletal: Negative.    Skin:        mass   Allergic/Immunologic: Negative.    Neurological: Negative.    Hematological: Negative.    Psychiatric/Behavioral: Negative.    All other systems reviewed and are negative.      Past Medical History:   Diagnosis Date   • Fibromyalgia    • Hyperlipidemia    • Hypertension    • Hypothyroidism    • Migraine    • Neuropathy        Allergies   Allergen Reactions   • Dilaudid [Hydromorphone Hcl] Other (See Comments)     Pt stated it made her die.       Past Surgical History:   Procedure Laterality Date   • APPENDECTOMY     • HYSTERECTOMY         Family History   Problem Relation Age of Onset   • Cancer Mother    • Diabetes Mother    • Cancer Father        Social History     Socioeconomic History   • Marital status:      Spouse name: Not on file   • Number of children: Not on file   • Years of education: Not on file   • Highest education level: Not on file   Tobacco Use   • Smoking status: Current Every Day Smoker     Packs/day: 1.00     Types: Cigarettes   • Smokeless tobacco: Never Used   Substance and Sexual Activity   • Alcohol use: No     Frequency: Never   • Drug use: No   • Sexual activity:  Defer           Objective   Physical Exam  Vitals signs and nursing note reviewed.   Constitutional:       Appearance: She is well-developed.   HENT:      Head: Normocephalic and atraumatic.   Eyes:      Conjunctiva/sclera: Conjunctivae normal.      Pupils: Pupils are equal, round, and reactive to light.   Neck:      Musculoskeletal: Normal range of motion.   Cardiovascular:      Rate and Rhythm: Normal rate and regular rhythm.      Heart sounds: Normal heart sounds.   Pulmonary:      Effort: Pulmonary effort is normal.      Breath sounds: Normal breath sounds.   Chest:          Comments: Patient points to her xiphoid process when asked where the mass is located.  Xiphoid process was palpated and patient states this is the mass she has been feeling.  Abdominal:      Palpations: Abdomen is soft.      Tenderness: There is no abdominal tenderness.   Musculoskeletal: Normal range of motion.         General: No deformity.   Skin:     General: Skin is warm.   Neurological:      Mental Status: She is alert and oriented to person, place, and time.   Psychiatric:         Behavior: Behavior normal.         Procedures           ED Course      I explained to the patient that this was a normal finding and it was her xiphoid process.  I told the patient if she was extremely concerned I could perform a CT scan to confirm this but patient was fine with this diagnosis.  I advised her to follow-up with her PCP and to get a CT scan or further work-up if she has any new symptoms or anything else concerns her.  Patient is agreeable.  Blood Pressure and heart rate were elevated upon triage.  However, the patient was very anxious.  Upon recheck vital signs were normal.                                     MDM    Final diagnoses:   Well adult exam            Camille Bonilla MD  02/17/21 [id number]

## 2021-03-08 ENCOUNTER — HOSPITAL ENCOUNTER (OUTPATIENT)
Dept: PAIN MANAGEMENT | Age: 56
Discharge: HOME | End: 2021-03-08
Payer: MEDICARE

## 2021-03-08 VITALS
SYSTOLIC BLOOD PRESSURE: 155 MMHG | RESPIRATION RATE: 18 BRPM | TEMPERATURE: 97.6 F | DIASTOLIC BLOOD PRESSURE: 87 MMHG | OXYGEN SATURATION: 97 %

## 2021-03-08 PROCEDURE — 2500000003 HC RX 250 WO HCPCS

## 2021-03-08 PROCEDURE — 64405 NJX AA&/STRD GR OCPL NRV: CPT | Performed by: PSYCHIATRY & NEUROLOGY

## 2021-03-08 PROCEDURE — 64405 NJX AA&/STRD GR OCPL NRV: CPT

## 2021-03-08 PROCEDURE — 64450 NJX AA&/STRD OTHER PN/BRANCH: CPT | Performed by: PSYCHIATRY & NEUROLOGY

## 2021-03-08 RX ORDER — BUPIVACAINE HYDROCHLORIDE 2.5 MG/ML
5 INJECTION, SOLUTION EPIDURAL; INFILTRATION; INTRACAUDAL ONCE
Status: DISCONTINUED | OUTPATIENT
Start: 2021-03-08 | End: 2021-03-10 | Stop reason: HOSPADM

## 2021-03-08 ASSESSMENT — PAIN DESCRIPTION - PROGRESSION: CLINICAL_PROGRESSION: GRADUALLY WORSENING

## 2021-03-08 ASSESSMENT — PAIN SCALES - GENERAL: PAINLEVEL_OUTOF10: 7

## 2021-03-08 ASSESSMENT — PAIN DESCRIPTION - LOCATION: LOCATION: NECK

## 2021-03-08 ASSESSMENT — PAIN DESCRIPTION - PAIN TYPE: TYPE: CHRONIC PAIN

## 2021-03-09 ENCOUNTER — TELEPHONE (OUTPATIENT)
Dept: NEUROLOGY | Age: 56
End: 2021-03-09

## 2021-03-09 NOTE — TELEPHONE ENCOUNTER
Called patient to let them know we had to reschedule appointment that was on 3/11/21 with Dr. Ricardo Rosales, he will not be in the office that day. Pt is aware of the new appointment time and date.

## 2021-03-31 ENCOUNTER — TELEPHONE (OUTPATIENT)
Dept: NEUROLOGY | Age: 56
End: 2021-03-31

## 2021-03-31 NOTE — TELEPHONE ENCOUNTER
Ambrosio Chance requests that nurse return their call. The best time to reach her is Anytime. Patient having some teeth pulled and was told called talk to Dr Lali Whitman if she can get some pain medication ,please call the patient. Thank you. Sob dry cough rt rib shoulder and neck pain coughed up small amount of blood in sputum this am

## 2021-03-31 NOTE — TELEPHONE ENCOUNTER
Patient called again. She has several teeth broke off at the gum and is in pain. She has a dentist appointment Saturday to get them cut out and was told to take the Fioricet prescribed by you for pain until then. She has taken several today, 2 every few hours, to the point she is sick to her stomach. She stated the pain isn't any better and she is going to run out of Fioricet for her headaches before time to fill again. She would like to know if you can give her something to help her. She got [facility] on 3/17/2021. Kj Cherry dated 3/31/2021 scanned into media.

## 2021-04-01 DIAGNOSIS — R51.9 NONINTRACTABLE HEADACHE, UNSPECIFIED CHRONICITY PATTERN, UNSPECIFIED HEADACHE TYPE: Primary | ICD-10-CM

## 2021-04-01 RX ORDER — OXYCODONE AND ACETAMINOPHEN 7.5; 325 MG/1; MG/1
1 TABLET ORAL EVERY 6 HOURS PRN
Qty: 30 TABLET | Refills: 0 | Status: SHIPPED | OUTPATIENT
Start: 2021-04-01 | End: 2021-05-01

## 2021-04-12 ENCOUNTER — HOSPITAL ENCOUNTER (OUTPATIENT)
Dept: PAIN MANAGEMENT | Age: 56
Discharge: HOME | End: 2021-04-12
Payer: MEDICARE

## 2021-04-12 VITALS
DIASTOLIC BLOOD PRESSURE: 94 MMHG | SYSTOLIC BLOOD PRESSURE: 165 MMHG | OXYGEN SATURATION: 95 % | TEMPERATURE: 97.9 F | HEART RATE: 65 BPM | RESPIRATION RATE: 18 BRPM

## 2021-04-12 PROCEDURE — 2500000003 HC RX 250 WO HCPCS

## 2021-04-12 PROCEDURE — 64405 NJX AA&/STRD GR OCPL NRV: CPT

## 2021-04-12 PROCEDURE — 64450 NJX AA&/STRD OTHER PN/BRANCH: CPT | Performed by: PSYCHIATRY & NEUROLOGY

## 2021-04-12 PROCEDURE — 64615 CHEMODENERV MUSC MIGRAINE: CPT

## 2021-04-12 PROCEDURE — 64405 NJX AA&/STRD GR OCPL NRV: CPT | Performed by: PSYCHIATRY & NEUROLOGY

## 2021-04-12 NOTE — PROGRESS NOTES
Patient states that he/she has ___10___ headaches out of 30 days each month.   Patient states that he/she has ____3__ migraines out of 30 days each month.monthly

## 2021-05-03 ENCOUNTER — TELEPHONE (OUTPATIENT)
Dept: PRIMARY CARE CLINIC | Age: 56
End: 2021-05-03

## 2021-05-03 NOTE — TELEPHONE ENCOUNTER
Patient hasnt been to our office    She was made a new patient appointment on 11-06-20 for Donis Mcfarlane she no showed    She made a appt on 12-15-20 she cancelled    She has a no show rate of 15% please advise on dismissal

## 2021-05-06 ENCOUNTER — OFFICE VISIT (OUTPATIENT)
Dept: NEUROLOGY | Age: 56
End: 2021-05-06
Payer: MEDICARE

## 2021-05-06 VITALS
HEART RATE: 92 BPM | SYSTOLIC BLOOD PRESSURE: 172 MMHG | DIASTOLIC BLOOD PRESSURE: 87 MMHG | BODY MASS INDEX: 23.92 KG/M2 | WEIGHT: 135 LBS | HEIGHT: 63 IN

## 2021-05-06 DIAGNOSIS — Z79.899 ON LONG TERM DRUG THERAPY: Primary | ICD-10-CM

## 2021-05-06 DIAGNOSIS — G89.29 OTHER CHRONIC PAIN: ICD-10-CM

## 2021-05-06 DIAGNOSIS — R51.9 NONINTRACTABLE HEADACHE, UNSPECIFIED CHRONICITY PATTERN, UNSPECIFIED HEADACHE TYPE: ICD-10-CM

## 2021-05-06 PROCEDURE — 80305 DRUG TEST PRSMV DIR OPT OBS: CPT | Performed by: PSYCHIATRY & NEUROLOGY

## 2021-05-06 PROCEDURE — 99213 OFFICE O/P EST LOW 20 MIN: CPT | Performed by: PSYCHIATRY & NEUROLOGY

## 2021-05-06 RX ORDER — BUTALBITAL, ACETAMINOPHEN AND CAFFEINE 50; 325; 40 MG/1; MG/1; MG/1
1 TABLET ORAL EVERY 4 HOURS PRN
COMMUNITY
End: 2022-06-03 | Stop reason: SDUPTHER

## 2021-05-06 RX ORDER — CARISOPRODOL 350 MG/1
350 TABLET ORAL 4 TIMES DAILY PRN
COMMUNITY
End: 2021-06-21 | Stop reason: SDUPTHER

## 2021-05-06 NOTE — PROGRESS NOTES
ago TOTAL        Family History   Problem Relation Age of Onset   Hunt Cancer Mother         ovarian    Diabetes Mother     Cancer Father     High Blood Pressure Brother     Diabetes Maternal Grandmother     Cancer Other         bladder       No Known Allergies    Social History     Socioeconomic History    Marital status: Single     Spouse name: Not on file    Number of children: Not on file    Years of education: Not on file    Highest education level: Not on file   Occupational History    Not on file   Social Needs    Financial resource strain: Not on file    Food insecurity     Worry: Not on file     Inability: Not on file    Transportation needs     Medical: Not on file     Non-medical: Not on file   Tobacco Use    Smoking status: Current Every Day Smoker     Packs/day: 1.00     Years: 20.00     Pack years: 20.00    Smokeless tobacco: Never Used   Substance and Sexual Activity    Alcohol use: Yes     Comment: occasional     Drug use: No    Sexual activity: Never   Lifestyle    Physical activity     Days per week: Not on file     Minutes per session: Not on file    Stress: Not on file   Relationships    Social connections     Talks on phone: Not on file     Gets together: Not on file     Attends [employer] service: Not on file     Active member of club or organization: Not on file     Attends meetings of clubs or organizations: Not on file     Relationship status: Not on file    Intimate partner violence     Fear of current or ex partner: Not on file     Emotionally abused: Not on file     Physically abused: Not on file     Forced sexual activity: Not on file   Other Topics Concern    Not on file   Social History Narrative    Not on file     Review of Systems     Constitutional - No fever or chills. No diaphoresis or significant fatigue. HENT -  No tinnitus or significant hearing loss.   Eyes - no sudden vision change or eye pain  Respiratory - no significant shortness of breath or cough  Cardiovascular - no chest pain No palpitations or significant leg swelling  Gastrointestinal - no abdominal swelling or pain. Genitourinary - No difficulty urinating, dysuria  Musculoskeletal - no back pain or myalgia. Skin - no color change or rash  Neurologic - No seizures. No lateralizing weakness. Hematologic - no easy bruising or excessive bleeding. Psychiatric - no severe anxiety or nervousness. All other review of systems are negative. Current Outpatient Medications   Medication Sig Dispense Refill    butalbital-acetaminophen-caffeine (FIORICET, ESGIC) -40 MG per tablet Take 1 tablet by mouth every 4 hours as needed for Headaches      carisoprodol (SOMA) 350 MG tablet Take 350 mg by mouth 4 times daily as needed for Muscle spasms.  benztropine (COGENTIN) 1 MG tablet Take 1 mg by mouth daily      fenofibrate 160 MG tablet   3    promethazine (PHENERGAN) 25 MG tablet   5    lisinopril (PRINIVIL;ZESTRIL) 5 MG tablet       ABILIFY 5 MG tablet       clonazePAM (KLONOPIN) 0.5 MG tablet 3 times daily as needed       CYMBALTA 60 MG capsule       estradiol (CLIMARA) 0.1 MG/24HR       PREMARIN 1.25 MG tablet       zolpidem (AMBIEN CR) 12.5 MG CR tablet       PROMETHEGAN 25 MG suppository       pravastatin (PRAVACHOL) 20 MG tablet       levothyroxine (SYNTHROID) 88 MCG tablet        No current facility-administered medications for this visit. BP (!) 172/87   Pulse 92   Ht 5' 3\" (1.6 m)   Wt 135 lb (61.2 kg)   BMI 23.91 kg/m²     Constitutional - well developed, well nourished. HENT - head normocephalic. Eyes - conjunctiva normal.  EOMS normal.  Neck- ROM appears normal, no tracheal deviation. Extremities -no edema     Musculoskeletal - ROM appears normal.  No significant edema. Skin - warm, dry, and intact. No rash, erythema, or pallor.   Psychiatric - mood, affect, and behavior appear normal.      Neurological exam  Awake, alert, fluent  appropriate affect  Speech normal without dysarthria    Cranial Nerve Exam    EOMI, No nystagmus, conjugate eye movements, no ptosis  Symmetric facies    Motor Exam  Antigravity throughout      Tremors- no tremors in hands or head noted    Gait  Normal base and speed  No ataxia      No results found for: BATAKRSI88  No results found for: WBC, HGB, HCT, MCV, PLT  No results found for: NA, K, CL, CO2, BUN, CREATININE, GLUCOSE, CALCIUM, PROT, LABALBU, BILITOT, ALKPHOS, AST, ALT, LABGLOM, GFRAA, AGRATIO, GLOB        Assessment    ICD-10-CM    1. On long term drug therapy  Z79.899 POCT Rapid Drug Screen   2. Nonintractable headache, unspecified chronicity pattern, unspecified headache type  R51.9    3. Other chronic pain  G89.29        Her neurological examination today was significant for some decreased light touch over the third and fourth fingers of her right hand. Based upon her history and examination, I suspect that much of her symptoms are related to musculoskeletal injury and strain. Her MRI of the brain, MRA of the head, MRI of the cervical and lumbosacral spine were unremarkable. Her EMG with nerve conduction study of her right arm and legs was consistent with a mild carpal tunnel syndrome. She was continued with Soma for her neck discomfort and referred to physical therapy. She was given a script for Flector patches to be applied to her neck or back to see if this provides any benefit. Today she had 10 cc of bupivacaine in to her bilateral occipital nerve and cervical regions. We discussed referral to pain management but she did not wish to pursue this. She did not wish to initiate any new medications. Her venous US of the legs were unremarkable. Her MRI of the C and LS spine was stable. The patient indicated understanding of the diagnosis and treatment plan. She is to follow up with me in approximately 3 month and call with any further problems. continue current care    Continue present care    Plan    Orders Placed This Encounter   Procedures    POCT Rapid Drug Screen       No orders of the defined types were placed in this encounter. Return in about 3 months (around 8/6/2021).

## 2021-05-10 ENCOUNTER — HOSPITAL ENCOUNTER (OUTPATIENT)
Dept: PAIN MANAGEMENT | Age: 56
Discharge: HOME | End: 2021-05-10
Payer: MEDICARE

## 2021-05-10 VITALS
SYSTOLIC BLOOD PRESSURE: 174 MMHG | DIASTOLIC BLOOD PRESSURE: 106 MMHG | RESPIRATION RATE: 20 BRPM | HEART RATE: 72 BPM | OXYGEN SATURATION: 100 % | TEMPERATURE: 97 F

## 2021-05-10 PROCEDURE — 64450 NJX AA&/STRD OTHER PN/BRANCH: CPT | Performed by: PSYCHIATRY & NEUROLOGY

## 2021-05-10 PROCEDURE — 64405 NJX AA&/STRD GR OCPL NRV: CPT | Performed by: PSYCHIATRY & NEUROLOGY

## 2021-05-10 PROCEDURE — 64405 NJX AA&/STRD GR OCPL NRV: CPT

## 2021-05-10 PROCEDURE — 2500000003 HC RX 250 WO HCPCS

## 2021-05-10 RX ORDER — BUPIVACAINE HYDROCHLORIDE 2.5 MG/ML
5 INJECTION, SOLUTION EPIDURAL; INFILTRATION; INTRACAUDAL ONCE
Status: DISCONTINUED | OUTPATIENT
Start: 2021-05-10 | End: 2021-05-12 | Stop reason: HOSPADM

## 2021-05-10 NOTE — PROGRESS NOTES
Patient states that he/she has _18_____ headaches out of 30 days each month.   Patient states that he/she has __2____ migraines out of 30 days each month.monthly

## 2021-05-10 NOTE — PROGRESS NOTES
Procedure:  Level of Consciousness: [x]Alert []Oriented []Disoriented []Lethargic  Anxiety Level: [x]Calm []Anxious []Depressed []Other  Skin: [x]Warm []Dry []Cool []Moist []Intact []Other  Cardiovascular: []Palpitations: [x]Never []Occasionally []Frequently  Chest Pain: [x]No []Yes  Respiratory:  [x]Unlabored []Labored []Cough ([] Productive []Unproductive)  HCG Required: [x]No []Yes   Results: []Negative []Positive  Knowledge Level:        [x]Patient/Other verbalized understanding of pre-procedure instructions. [x]Assessment of post-op care needs (transportation, responsible caregiver)        [x]Able to discuss health care problems and how to deal with it.   Factors that Affect Teaching:        Language Barrier: [x]No []Yes - why:        Hearing Loss:        [x]No []Yes            Corrective Device:  []Yes []No        Vision Loss:           [x]No []Yes            Corrective Device:  []Yes []No        Memory Loss:       [x]No []Yes            []Short Term []Long Term  Motivational Level:  [x]Asks Questions                  []Extremely Anxious       [x]Seems Interested               []Seems Uninterested                  [x]Denies need for Education  Risk for Injury:  [x]Patient oriented to person, place and time  []History of frequent falls/loss of balance  Nutritional:  []Change in appetite   []Weight Gain   []Weight Loss  Functional:  []Requires assistance with ADL's

## 2021-05-11 ENCOUNTER — TELEPHONE (OUTPATIENT)
Dept: NEUROLOGY | Age: 56
End: 2021-05-11

## 2021-05-11 NOTE — TELEPHONE ENCOUNTER
The patient was up front with me at her appointment and stated that someone had given her a Clonazepam a while back. Per the drug screen results it was a very low number so I knew that she had taken the medication a while back. I told her I would make note of it and since she was honest about it that we would let it go this time.

## 2021-06-21 ENCOUNTER — HOSPITAL ENCOUNTER (OUTPATIENT)
Dept: PAIN MANAGEMENT | Age: 56
Discharge: HOME | End: 2021-06-21
Payer: MEDICARE

## 2021-06-21 VITALS
HEART RATE: 57 BPM | RESPIRATION RATE: 18 BRPM | OXYGEN SATURATION: 94 % | DIASTOLIC BLOOD PRESSURE: 86 MMHG | SYSTOLIC BLOOD PRESSURE: 157 MMHG | TEMPERATURE: 96.5 F

## 2021-06-21 DIAGNOSIS — R51.9 NONINTRACTABLE HEADACHE, UNSPECIFIED CHRONICITY PATTERN, UNSPECIFIED HEADACHE TYPE: Primary | ICD-10-CM

## 2021-06-21 PROCEDURE — 64405 NJX AA&/STRD GR OCPL NRV: CPT

## 2021-06-21 PROCEDURE — 64405 NJX AA&/STRD GR OCPL NRV: CPT | Performed by: PSYCHIATRY & NEUROLOGY

## 2021-06-21 PROCEDURE — 64450 NJX AA&/STRD OTHER PN/BRANCH: CPT | Performed by: PSYCHIATRY & NEUROLOGY

## 2021-06-21 PROCEDURE — 2500000003 HC RX 250 WO HCPCS

## 2021-06-21 RX ORDER — BUPIVACAINE HYDROCHLORIDE 2.5 MG/ML
5 INJECTION, SOLUTION EPIDURAL; INFILTRATION; INTRACAUDAL ONCE
Status: DISCONTINUED | OUTPATIENT
Start: 2021-06-21 | End: 2021-06-23 | Stop reason: HOSPADM

## 2021-06-21 RX ORDER — CARISOPRODOL 350 MG/1
350 TABLET ORAL 3 TIMES DAILY PRN
Qty: 90 TABLET | Refills: 5 | Status: SHIPPED | OUTPATIENT
Start: 2021-06-21 | End: 2021-12-20 | Stop reason: SDUPTHER

## 2021-07-26 ENCOUNTER — HOSPITAL ENCOUNTER (OUTPATIENT)
Dept: PAIN MANAGEMENT | Age: 56
Discharge: HOME | End: 2021-07-26
Payer: MEDICARE

## 2021-07-26 VITALS
DIASTOLIC BLOOD PRESSURE: 88 MMHG | OXYGEN SATURATION: 95 % | SYSTOLIC BLOOD PRESSURE: 140 MMHG | TEMPERATURE: 96.2 F | RESPIRATION RATE: 18 BRPM | HEART RATE: 81 BPM

## 2021-07-26 DIAGNOSIS — G89.29 OTHER CHRONIC PAIN: ICD-10-CM

## 2021-07-26 DIAGNOSIS — R51.9 NONINTRACTABLE HEADACHE, UNSPECIFIED CHRONICITY PATTERN, UNSPECIFIED HEADACHE TYPE: ICD-10-CM

## 2021-07-26 PROCEDURE — 64405 NJX AA&/STRD GR OCPL NRV: CPT | Performed by: PSYCHIATRY & NEUROLOGY

## 2021-07-26 PROCEDURE — 2500000003 HC RX 250 WO HCPCS

## 2021-07-26 PROCEDURE — 64450 NJX AA&/STRD OTHER PN/BRANCH: CPT | Performed by: PSYCHIATRY & NEUROLOGY

## 2021-07-26 PROCEDURE — 64405 NJX AA&/STRD GR OCPL NRV: CPT

## 2021-07-26 RX ORDER — LEVETIRACETAM 500 MG/1
500 TABLET ORAL 2 TIMES DAILY
Qty: 60 TABLET | Refills: 2 | Status: SHIPPED | OUTPATIENT
Start: 2021-07-26

## 2021-07-26 RX ORDER — BUTALBITAL, ACETAMINOPHEN, CAFFEINE AND CODEINE PHOSPHATE 50; 325; 40; 30 MG/1; MG/1; MG/1; MG/1
CAPSULE ORAL
Qty: 120 CAPSULE | Refills: 5 | Status: SHIPPED | OUTPATIENT
Start: 2021-07-26 | End: 2021-12-20 | Stop reason: SDUPTHER

## 2021-07-26 RX ORDER — BUPIVACAINE HYDROCHLORIDE 2.5 MG/ML
5 INJECTION, SOLUTION EPIDURAL; INFILTRATION; INTRACAUDAL ONCE
Status: DISCONTINUED | OUTPATIENT
Start: 2021-07-26 | End: 2021-07-28 | Stop reason: HOSPADM

## 2021-08-05 ENCOUNTER — OFFICE VISIT (OUTPATIENT)
Dept: NEUROLOGY | Age: 56
End: 2021-08-05
Payer: MEDICARE

## 2021-08-05 VITALS
WEIGHT: 135 LBS | DIASTOLIC BLOOD PRESSURE: 88 MMHG | BODY MASS INDEX: 23.92 KG/M2 | HEART RATE: 83 BPM | HEIGHT: 63 IN | SYSTOLIC BLOOD PRESSURE: 135 MMHG

## 2021-08-05 DIAGNOSIS — R51.9 NONINTRACTABLE HEADACHE, UNSPECIFIED CHRONICITY PATTERN, UNSPECIFIED HEADACHE TYPE: Primary | ICD-10-CM

## 2021-08-05 PROCEDURE — 99213 OFFICE O/P EST LOW 20 MIN: CPT | Performed by: PSYCHIATRY & NEUROLOGY

## 2021-08-05 NOTE — PROGRESS NOTES
Claudia Weir is a 55y.o. year old female who is seen for evaluation of multiple complaints. 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 One day she was taking some hot items out of the oven when something burnt her and she turned her head quickly. She heard a popping noise and following this began to have significant neck pain. With time the pain he improved. Approximately 1 month ago she once again turned her head quickly and heard a popping noise. Her headaches return to once again. The headache involves the back of her head with no associated photophobia, phonophobia, or nausea. In addition she has migraines approximately once her week following her hysterectomy two years ago. These can last for up to two days. She has been tried on multiple medications in the past for migraine prophylaxis including propranolol, Topamax, and Elavil and Depakote. She complaints of numbness in the third and fourth fingers of her right hand over the last one month as well. She has significant neck and back pain. Since her last visit nerve block helps about 3-4 weeks. No new issues. Chief complaint: neck pain  .     Active Ambulatory Problems     Diagnosis Date Noted    Breast lesion, right 11/22/2013    Breast mass, right 11/22/2013    Costochondral chest pain 11/22/2013    Pain, neck 06/13/2016    Migraine without status migrainosus, not intractable 06/13/2016    Headache disorder 06/13/2016    Chronic pain 07/20/2016    Voice hoarseness 01/25/2017    Nonintractable headache 05/22/2019     Resolved Ambulatory Problems     Diagnosis Date Noted    No Resolved Ambulatory Problems     Past Medical History:   Diagnosis Date    Chronic back pain     Depression     Fibromyalgia     Headache     Hyperlipidemia     Hypertension     Hypothyroidism     Neuropathy        Past Surgical History:   Procedure Laterality Date    APPENDECTOMY      [age] of [age]    HYSTERECTOMY      8 yrs ago TOTAL        Family History   Problem Relation Age of Onset   [employer] Cancer Mother         ovarian    Diabetes Mother     Cancer Father     High Blood Pressure Brother     Diabetes Maternal Grandmother     Cancer Other         bladder       No Known Allergies    Social History     Socioeconomic History    Marital status: Single     Spouse name: Not on file    Number of children: Not on file    Years of education: Not on file    Highest education level: Not on file   Occupational History    Not on file   Tobacco Use    Smoking status: Current Every Day Smoker     Packs/day: 1.00     Years: 20.00     Pack years: 20.00    Smokeless tobacco: Never Used   Vaping Use    Vaping Use: Never used   Substance and Sexual Activity    Alcohol use: Yes     Comment: occasional     Drug use: No    Sexual activity: Never   Other Topics Concern    Not on file   Social History Narrative    Not on file     Social Determinants of Health     Financial Resource Strain:     Difficulty of Paying Living Expenses:    Food Insecurity:     Worried About Running Out of Food in the Last Year:     Ran Out of Food in the Last Year:    Transportation Needs:     Lack of Transportation (Medical):  Lack of Transportation (Non-Medical):    Physical Activity:     Days of Exercise per Week:     Minutes of Exercise per Session:    Stress:     Feeling of Stress :    Social Connections:     Frequency of Communication with Friends and Family:     Frequency of Social Gatherings with Friends and Family:     Attends Sabianism Services:     Active Member of Clubs or Organizations:     Attends Club or Organization Meetings:     Marital Status:    Intimate Partner Violence:     Fear of Current or Ex-Partner:     Emotionally Abused:     Physically Abused:     Sexually Abused:      Review of Systems     Constitutional - No fever or chills. No diaphoresis or significant fatigue.   HENT -  No tinnitus or significant hearing loss.  Eyes - no sudden vision change or eye pain  Respiratory - no significant shortness of breath or cough  Cardiovascular - no chest pain No palpitations or significant leg swelling  Gastrointestinal - no abdominal swelling or pain. Genitourinary - No difficulty urinating, dysuria  Musculoskeletal - no back pain or myalgia. Skin - no color change or rash  Neurologic - No seizures. No lateralizing weakness. Hematologic - no easy bruising or excessive bleeding. Psychiatric - no severe anxiety or nervousness. All other review of systems are negative. Current Outpatient Medications   Medication Sig Dispense Refill    levETIRAcetam (KEPPRA) 500 MG tablet Take 1 tablet by mouth 2 times daily 60 tablet 2    butalbital-acetaminophen-caffeine-codeine (FIORICET WITH CODEINE) -20-30 MG per capsule 1 q 6 prn 120 capsule 5    butalbital-acetaminophen-caffeine (FIORICET, ESGIC) -40 MG per tablet Take 1 tablet by mouth every 4 hours as needed for Headaches      benztropine (COGENTIN) 1 MG tablet Take 1 mg by mouth daily      fenofibrate 160 MG tablet   3    promethazine (PHENERGAN) 25 MG tablet   5    lisinopril (PRINIVIL;ZESTRIL) 5 MG tablet       ABILIFY 5 MG tablet       clonazePAM (KLONOPIN) 0.5 MG tablet 3 times daily as needed       CYMBALTA 60 MG capsule       estradiol (CLIMARA) 0.1 MG/24HR       PREMARIN 1.25 MG tablet       zolpidem (AMBIEN CR) 12.5 MG CR tablet       PROMETHEGAN 25 MG suppository       pravastatin (PRAVACHOL) 20 MG tablet       levothyroxine (SYNTHROID) 88 MCG tablet        No current facility-administered medications for this visit. /88   Pulse 83   Ht 5' 3\" (1.6 m)   Wt 135 lb (61.2 kg)   BMI 23.91 kg/m²     Constitutional - well developed, well nourished. HENT - head normocephalic. Eyes - conjunctiva normal.  EOMS normal.  Neck- ROM appears normal, no tracheal deviation.   Extremities -no edema     Musculoskeletal - ROM appears normal. No significant edema. Skin - warm, dry, and intact. No rash, erythema, or pallor. Psychiatric - mood, affect, and behavior appear normal.      Neurological exam  Awake, alert, fluent  appropriate affect  Speech normal without dysarthria    Cranial Nerve Exam    EOMI, No nystagmus, conjugate eye movements, no ptosis  Symmetric facies    Motor Exam  Antigravity throughout      Tremors- no tremors in hands or head noted    Gait  Normal base and speed  No ataxia      No results found for: [id number]  No results found for: WBC, HGB, HCT, MCV, PLT  No results found for: NA, K, CL, CO2, BUN, CREATININE, GLUCOSE, CALCIUM, PROT, LABALBU, BILITOT, ALKPHOS, AST, ALT, LABGLOM, GFRAA, AGRATIO, GLOB        Assessment    ICD-10-CM    1. Nonintractable headache, unspecified chronicity pattern, unspecified headache type  R51.9        Her neurological examination today was significant for some decreased light touch over the third and fourth fingers of her right hand. Based upon her history and examination, I suspect that much of her symptoms are related to musculoskeletal injury and strain. Her MRI of the brain, MRA of the head, MRI of the cervical and lumbosacral spine were unremarkable. Her EMG with nerve conduction study of her right arm and legs was consistent with a mild carpal tunnel syndrome. She was continued with Soma for her neck discomfort and referred to physical therapy. She was given a script for Flector patches to be applied to her neck or back to see if this provides any benefit. She is getting her nerve blocks. We discussed referral to pain management but she did not wish to pursue this. She did not wish to initiate any new medications. Her venous US of the legs were unremarkable. Her MRI of the C and LS spine was stable. The patient indicated understanding of the diagnosis and treatment plan. She is to follow up with me in approximately 3 month and call with any further problems. continue current care as medicine is helping. Continue present care    Plan    No orders of the defined types were placed in this encounter. No orders of the defined types were placed in this encounter. Return in about 3 months (around 11/5/2021).

## 2021-08-30 ENCOUNTER — HOSPITAL ENCOUNTER (OUTPATIENT)
Dept: PAIN MANAGEMENT | Age: 56
Discharge: HOME | End: 2021-08-30
Payer: MEDICARE

## 2021-08-30 VITALS
TEMPERATURE: 96.5 F | SYSTOLIC BLOOD PRESSURE: 166 MMHG | OXYGEN SATURATION: 94 % | RESPIRATION RATE: 18 BRPM | HEART RATE: 78 BPM | DIASTOLIC BLOOD PRESSURE: 91 MMHG

## 2021-08-30 PROCEDURE — 64405 NJX AA&/STRD GR OCPL NRV: CPT

## 2021-08-30 PROCEDURE — 64450 NJX AA&/STRD OTHER PN/BRANCH: CPT | Performed by: PSYCHIATRY & NEUROLOGY

## 2021-08-30 PROCEDURE — 64405 NJX AA&/STRD GR OCPL NRV: CPT | Performed by: PSYCHIATRY & NEUROLOGY

## 2021-08-30 PROCEDURE — 2500000003 HC RX 250 WO HCPCS

## 2021-08-30 RX ORDER — BUPIVACAINE HYDROCHLORIDE 2.5 MG/ML
5 INJECTION, SOLUTION EPIDURAL; INFILTRATION; INTRACAUDAL ONCE
Status: DISCONTINUED | OUTPATIENT
Start: 2021-08-30 | End: 2021-09-01 | Stop reason: HOSPADM

## 2021-08-30 NOTE — PROGRESS NOTES
Procedure:  Level of Consciousness: [x]Alert [x]Oriented []Disoriented []Lethargic  Anxiety Level: [x]Calm []Anxious []Depressed []Other  Skin: [x]Warm [x]Dry []Cool []Moist []Intact []Other  Cardiovascular: [x]Palpitations: [x]Never []Occasionally []Frequently  Chest Pain: [x]No []Yes  Respiratory:  [x]Unlabored []Labored []Cough ([] Productive []Unproductive)  HCG Required: [x]No []Yes   Results: []Negative []Positive  Knowledge Level:        [x]Patient/Other verbalized understanding of pre-procedure instructions. []Assessment of post-op care needs (transportation, responsible caregiver)        [x]Able to discuss health care problems and how to deal with it.   Factors that Affect Teaching:        Language Barrier: [x]No []Yes - why:        Hearing Loss:        [x]No []Yes            Corrective Device:  []Yes []No        Vision Loss:           []No [x]Yes            Corrective Device:  [x]Yes []No        Memory Loss:       [x]No []Yes            []Short Term []Long Term  Motivational Level:  [x]Asks Questions                  []Extremely Anxious       [x]Seems Interested               []Seems Uninterested                  [x]Denies need for Education  Risk for Injury:  [x]Patient oriented to person, place and time  []History of frequent falls/loss of balance  Nutritional:  []Change in appetite   []Weight Gain   []Weight Loss  Functional:  []Requires assistance with ADL's

## 2021-09-25 ENCOUNTER — APPOINTMENT (OUTPATIENT)
Dept: GENERAL RADIOLOGY | Facility: HOSPITAL | Age: 56
End: 2021-09-25

## 2021-09-25 ENCOUNTER — HOSPITAL ENCOUNTER (EMERGENCY)
Facility: HOSPITAL | Age: 56
Discharge: HOME | End: 2021-09-25
Admitting: EMERGENCY MEDICINE

## 2021-09-25 ENCOUNTER — NURSE TRIAGE (OUTPATIENT)
Dept: CALL CENTER | Facility: HOSPITAL | Age: 56
End: 2021-09-25

## 2021-09-25 VITALS
HEART RATE: 69 BPM | DIASTOLIC BLOOD PRESSURE: 65 MMHG | RESPIRATION RATE: 16 BRPM | OXYGEN SATURATION: 93 % | TEMPERATURE: 98 F | BODY MASS INDEX: 21.26 KG/M2 | HEIGHT: 63 IN | WEIGHT: 120 LBS | SYSTOLIC BLOOD PRESSURE: 129 MMHG

## 2021-09-25 DIAGNOSIS — S52.601A CLOSED FRACTURE OF RIGHT DISTAL RADIUS AND ULNA, INITIAL ENCOUNTER: Primary | ICD-10-CM

## 2021-09-25 DIAGNOSIS — S52.501A CLOSED FRACTURE OF RIGHT DISTAL RADIUS AND ULNA, INITIAL ENCOUNTER: Primary | ICD-10-CM

## 2021-09-25 DIAGNOSIS — S52.502A CLOSED FRACTURE OF DISTAL END OF LEFT RADIUS, UNSPECIFIED FRACTURE MORPHOLOGY, INITIAL ENCOUNTER: ICD-10-CM

## 2021-09-25 PROCEDURE — 99283 EMERGENCY DEPT VISIT LOW MDM: CPT

## 2021-09-25 PROCEDURE — 73080 X-RAY EXAM OF ELBOW: CPT

## 2021-09-25 PROCEDURE — 73110 X-RAY EXAM OF WRIST: CPT

## 2021-09-25 PROCEDURE — 63710000001 ONDANSETRON ODT 4 MG TABLET DISPERSIBLE: Performed by: EMERGENCY MEDICINE

## 2021-09-25 PROCEDURE — 73090 X-RAY EXAM OF FOREARM: CPT

## 2021-09-25 PROCEDURE — 73130 X-RAY EXAM OF HAND: CPT

## 2021-09-25 RX ORDER — OXYCODONE AND ACETAMINOPHEN 7.5; 325 MG/1; MG/1
1 TABLET ORAL EVERY 6 HOURS PRN
Qty: 10 TABLET | Refills: 0 | Status: SHIPPED | OUTPATIENT
Start: 2021-09-25 | End: 2022-03-01

## 2021-09-25 RX ORDER — OXYCODONE AND ACETAMINOPHEN 7.5; 325 MG/1; MG/1
1 TABLET ORAL ONCE
Status: COMPLETED | OUTPATIENT
Start: 2021-09-25 | End: 2021-09-25

## 2021-09-25 RX ORDER — ONDANSETRON 4 MG/1
4 TABLET, ORALLY DISINTEGRATING ORAL ONCE
Status: COMPLETED | OUTPATIENT
Start: 2021-09-25 | End: 2021-09-25

## 2021-09-25 RX ADMIN — OXYCODONE HYDROCHLORIDE AND ACETAMINOPHEN 1 TABLET: 7.5; 325 TABLET ORAL at 11:19

## 2021-09-25 RX ADMIN — ONDANSETRON 4 MG: 4 TABLET, ORALLY DISINTEGRATING ORAL at 11:19

## 2021-09-25 NOTE — TELEPHONE ENCOUNTER
"FOOSH injury while skating. Has bilateral wrist pain, both are swollen, unable to dress self or open a can. Fell twice while skating last evening. Coming to the ED    Reason for Disposition  • Can't move injured arm at all    Additional Information  • Negative: Serious injury with multiple fractures (broken bones)  • Negative: [1] Major bleeding (e.g., actively dripping or spurting) AND [2] can't be stopped  • Negative: Sounds like a life-threatening emergency to the triager  • Negative: Wound looks infected  • Negative: Arm pain from overuse (e.g., sports, lifting, physical work)  • Negative: Arm pain not from an injury  • Negative: Shoulder injury is main concern  • Negative: Elbow injury is main concern  • Negative: Wrist or hand injury is main concern  • Negative: Finger injury is main concern  • Negative: Bullet wound, stabbed by knife, or other serious penetrating wound  • Negative: Looks like a broken bone (crooked or deformed)  • Negative: Looks like a dislocated joint    Answer Assessment - Initial Assessment Questions  1. MECHANISM: \"How did the injury happen?\"   patient was skating  2. ONSET: \"When did the injury happen?\" (Minutes or hours ago)    last night  3. LOCATION: \"Where is the injury located?\" \"Which arm?\"   both wrist   4. APPEARANCE of INJURY: \"What does the injury look like?\"      Both are swollen  5. SEVERITY: \"Can you use the arm normally?\"       no  6. SWELLING or BRUISING: \"is there any swelling or bruising?\" If Yes, ask: \"How large is it? (e.g., inches, centimeters)       Swelling of both wrist  7. PAIN: \"Is there pain?\" If Yes, ask: \"How bad is the pain?\"    (Scale 1-10; or mild, moderate, severe)    - NONE (0): no pain.    - MILD (1-3): doesn't interfere with normal activities    - MODERATE (4-7): interferes with normal activities (e.g., work or school) or awakens from sleep    - SEVERE (8-10): excruciating pain, unable to do any normal activities, unable to hold a cup of water    " "Moderate to severe  8. TETANUS: For any breaks in the skin, ask: \"When was the last tetanus booster?\"    na  9. OTHER SYMPTOMS: \"Do you have any other symptoms?\"  (e.g., numbness in hand)    Unable to dress self  10. PREGNANCY: \"Is there any chance you are pregnant?\" \"When was your last menstrual period?\"      na    Protocols used: ARM INJURY-ADULT-AH      "

## 2021-09-27 ENCOUNTER — TELEPHONE (OUTPATIENT)
Dept: NEUROLOGY | Age: 56
End: 2021-09-27

## 2021-09-27 NOTE — TELEPHONE ENCOUNTER
Julian Padilla requests that the office return their call. Patient states she fell while skating and broke both of her wirst on Friday. She went to the ED and was prescribed percocet 7.5 . She says the 7.5 is not touching the pain. She ask if Dr. Joe De Jesus would increase the dosage. She is waiting for the Surgeon to call for surgery. The best time to reach her is Anytime. Thank you.

## 2021-09-27 NOTE — TELEPHONE ENCOUNTER
Called Ally to let her know that we can't up her pain medication that she needs to call her PCP or to wait for her Surgeon to call her.

## 2021-10-25 ENCOUNTER — HOSPITAL ENCOUNTER (OUTPATIENT)
Dept: PAIN MANAGEMENT | Age: 56
Discharge: HOME | End: 2021-10-25
Payer: MEDICARE

## 2021-10-25 VITALS
SYSTOLIC BLOOD PRESSURE: 161 MMHG | OXYGEN SATURATION: 95 % | RESPIRATION RATE: 18 BRPM | DIASTOLIC BLOOD PRESSURE: 91 MMHG | TEMPERATURE: 96.8 F | HEART RATE: 60 BPM

## 2021-10-25 PROCEDURE — 64405 NJX AA&/STRD GR OCPL NRV: CPT | Performed by: PSYCHIATRY & NEUROLOGY

## 2021-10-25 PROCEDURE — 64405 NJX AA&/STRD GR OCPL NRV: CPT

## 2021-10-25 PROCEDURE — 64615 CHEMODENERV MUSC MIGRAINE: CPT

## 2021-10-25 PROCEDURE — 64450 NJX AA&/STRD OTHER PN/BRANCH: CPT | Performed by: PSYCHIATRY & NEUROLOGY

## 2021-10-25 PROCEDURE — 2500000003 HC RX 250 WO HCPCS

## 2021-10-25 RX ORDER — BUPIVACAINE HYDROCHLORIDE 2.5 MG/ML
10 INJECTION, SOLUTION EPIDURAL; INFILTRATION; INTRACAUDAL ONCE
Status: DISCONTINUED | OUTPATIENT
Start: 2021-10-25 | End: 2021-10-27 | Stop reason: HOSPADM

## 2021-10-25 NOTE — PROGRESS NOTES
Procedure:  Level of Consciousness: [x]Alert [x]Oriented []Disoriented []Lethargic  Anxiety Level: [x]Calm []Anxious []Depressed []Other  Skin: []Warm [x]Dry []Cool []Moist []Intact []Other  Cardiovascular: [x]Palpitations: [x]Never []Occasionally []Frequently  Chest Pain: [x]No []Yes  Respiratory:  [x]Unlabored []Labored []Cough ([] Productive []Unproductive)  HCG Required: [x]No []Yes   Results: []Negative []Positive  Knowledge Level:        [x]Patient/Other verbalized understanding of pre-procedure instructions. [x]Assessment of post-op care needs (transportation, responsible caregiver)        [x]Able to discuss health care problems and how to deal with it. Factors that Affect Teaching:        Language Barrier: [x]No []Yes - why:        Hearing Loss:        [x]No []Yes            Corrective Device:  []Yes []No        Vision Loss:           [x]No []Yes            Corrective Device:  []Yes []No        Memory Loss:       [x]No []Yes            []Short Term []Long Term  Motivational Level:  [x]Asks Questions                  []Extremely Anxious       [x]Seems Interested               []Seems Uninterested                  [x]Denies need for Education  Risk for Injury:  [x]Patient oriented to person, place and time  []History of frequent falls/loss of balance  Nutritional:  []Change in appetite   []Weight Gain   []Weight Loss  Functional:  []Requires assistance with ADL'sPatient states that he/she has __13____ headaches out of 30 days each month.   Patient states that he/she has ___3___ migraines out of 30 days each month.monthly

## 2021-11-23 ENCOUNTER — TELEPHONE (OUTPATIENT)
Dept: NEUROLOGY | Age: 56
End: 2021-11-23

## 2021-12-20 ENCOUNTER — HOSPITAL ENCOUNTER (OUTPATIENT)
Dept: PAIN MANAGEMENT | Age: 56
Discharge: HOME | End: 2021-12-20
Payer: MEDICARE

## 2021-12-20 VITALS
RESPIRATION RATE: 18 BRPM | OXYGEN SATURATION: 96 % | TEMPERATURE: 97.4 F | SYSTOLIC BLOOD PRESSURE: 164 MMHG | DIASTOLIC BLOOD PRESSURE: 83 MMHG | HEART RATE: 67 BPM

## 2021-12-20 DIAGNOSIS — R51.9 NONINTRACTABLE HEADACHE, UNSPECIFIED CHRONICITY PATTERN, UNSPECIFIED HEADACHE TYPE: ICD-10-CM

## 2021-12-20 DIAGNOSIS — G89.29 OTHER CHRONIC PAIN: ICD-10-CM

## 2021-12-20 PROCEDURE — 2500000003 HC RX 250 WO HCPCS

## 2021-12-20 PROCEDURE — 64405 NJX AA&/STRD GR OCPL NRV: CPT | Performed by: PSYCHIATRY & NEUROLOGY

## 2021-12-20 PROCEDURE — 64405 NJX AA&/STRD GR OCPL NRV: CPT

## 2021-12-20 PROCEDURE — 64450 NJX AA&/STRD OTHER PN/BRANCH: CPT | Performed by: PSYCHIATRY & NEUROLOGY

## 2021-12-20 RX ORDER — BUPIVACAINE HYDROCHLORIDE 2.5 MG/ML
5 INJECTION, SOLUTION EPIDURAL; INFILTRATION; INTRACAUDAL ONCE
Status: DISCONTINUED | OUTPATIENT
Start: 2021-12-20 | End: 2021-12-22 | Stop reason: HOSPADM

## 2021-12-20 RX ORDER — CARISOPRODOL 350 MG/1
350 TABLET ORAL 3 TIMES DAILY PRN
Qty: 90 TABLET | Refills: 5 | Status: SHIPPED | OUTPATIENT
Start: 2021-12-20 | End: 2022-01-19

## 2021-12-20 RX ORDER — BUTALBITAL, ACETAMINOPHEN, CAFFEINE AND CODEINE PHOSPHATE 50; 325; 40; 30 MG/1; MG/1; MG/1; MG/1
CAPSULE ORAL
Qty: 120 CAPSULE | Refills: 5 | Status: SHIPPED | OUTPATIENT
Start: 2021-12-20 | End: 2022-06-27 | Stop reason: SDUPTHER

## 2021-12-30 ENCOUNTER — OFFICE VISIT (OUTPATIENT)
Dept: NEUROLOGY | Age: 56
End: 2021-12-30
Payer: MEDICARE

## 2021-12-30 VITALS
BODY MASS INDEX: 21.26 KG/M2 | OXYGEN SATURATION: 97 % | SYSTOLIC BLOOD PRESSURE: 163 MMHG | RESPIRATION RATE: 20 BRPM | HEART RATE: 70 BPM | HEIGHT: 63 IN | WEIGHT: 120 LBS | DIASTOLIC BLOOD PRESSURE: 97 MMHG

## 2021-12-30 DIAGNOSIS — M54.2 PAIN, NECK: ICD-10-CM

## 2021-12-30 DIAGNOSIS — G89.29 OTHER CHRONIC PAIN: ICD-10-CM

## 2021-12-30 DIAGNOSIS — R51.9 NONINTRACTABLE HEADACHE, UNSPECIFIED CHRONICITY PATTERN, UNSPECIFIED HEADACHE TYPE: Primary | ICD-10-CM

## 2021-12-30 DIAGNOSIS — G43.909 MIGRAINE WITHOUT STATUS MIGRAINOSUS, NOT INTRACTABLE, UNSPECIFIED MIGRAINE TYPE: ICD-10-CM

## 2021-12-30 PROCEDURE — 99214 OFFICE O/P EST MOD 30 MIN: CPT | Performed by: PSYCHIATRY & NEUROLOGY

## 2021-12-30 RX ORDER — HYDROCODONE BITARTRATE AND ACETAMINOPHEN 7.5; 325 MG/1; MG/1
1 TABLET ORAL EVERY 6 HOURS PRN
COMMUNITY
End: 2022-01-17

## 2021-12-30 NOTE — PROGRESS NOTES
ago TOTAL        Family History   Problem Relation Age of Onset   [employer] Cancer Mother         ovarian    Diabetes Mother     Cancer Father     High Blood Pressure Brother     Diabetes Maternal Grandmother     Cancer Other         bladder       No Known Allergies    Social History     Socioeconomic History    Marital status: Single     Spouse name: Not on file    Number of children: Not on file    Years of education: Not on file    Highest education level: Not on file   Occupational History    Not on file   Tobacco Use    Smoking status: Current Every Day Smoker     Packs/day: 1.00     Years: 20.00     Pack years: 20.00    Smokeless tobacco: Never Used   Vaping Use    Vaping Use: Never used   Substance and Sexual Activity    Alcohol use: Yes     Comment: occasional     Drug use: No    Sexual activity: Never   Other Topics Concern    Not on file   Social History Narrative    Not on file     Social Determinants of Health     Financial Resource Strain:     Difficulty of Paying Living Expenses: Not on file   Food Insecurity:     Worried About Running Out of Food in the Last Year: Not on file    [place] of Food in the Last Year: Not on file   Transportation Needs:     Lack of Transportation (Medical): Not on file    Lack of Transportation (Non-Medical):  Not on file   Physical Activity:     Days of Exercise per Week: Not on file    Minutes of Exercise per Session: Not on file   Stress:     Feeling of Stress : Not on file   Social Connections:     Frequency of Communication with Friends and Family: Not on file    Frequency of Social Gatherings with Friends and Family: Not on file    Attends Taoist Services: Not on file    Active Member of Clubs or Organizations: Not on file    Attends Club or Organization Meetings: Not on file    Marital Status: Not on file   Intimate Partner Violence:     Fear of Current or Ex-Partner: Not on file    Emotionally Abused: Not on file    Physically Abused: Not on file    Sexually Abused: Not on file   Housing Stability:     Unable to Pay for Housing in the Last Year: Not on file    Number of Places Lived in the Last Year: Not on file    Unstable Housing in the Last Year: Not on file     Review of Systems     Constitutional - No fever or chills. No diaphoresis or significant fatigue. HENT -  No tinnitus or significant hearing loss. Eyes - no sudden vision change or eye pain  Respiratory - no significant shortness of breath or cough  Cardiovascular - no chest pain No palpitations or significant leg swelling  Gastrointestinal - no abdominal swelling or pain. Genitourinary - No difficulty urinating, dysuria  Musculoskeletal - no back pain or myalgia. Skin - no color change or rash  Neurologic - No seizures. No lateralizing weakness. Hematologic - no easy bruising or excessive bleeding. Psychiatric - no severe anxiety or nervousness. All other review of systems are negative. Current Outpatient Medications   Medication Sig Dispense Refill    HYDROcodone-acetaminophen (NORCO) 7.5-325 MG per tablet Take 1 tablet by mouth every 6 hours as needed for Pain.  butalbital-acetaminophen-caffeine-codeine (FIORICET WITH CODEINE) -96-30 MG per capsule 1 q 6 prn 120 capsule 5    carisoprodol (SOMA) 350 MG tablet Take 1 tablet by mouth 3 times daily as needed for Muscle spasms for up to 30 days.  90 tablet 5    butalbital-acetaminophen-caffeine (FIORICET, ESGIC) -40 MG per tablet Take 1 tablet by mouth every 4 hours as needed for Headaches      benztropine (COGENTIN) 1 MG tablet Take 1 mg by mouth daily      fenofibrate 160 MG tablet   3    lisinopril (PRINIVIL;ZESTRIL) 5 MG tablet       ABILIFY 5 MG tablet       clonazePAM (KLONOPIN) 0.5 MG tablet 3 times daily as needed       CYMBALTA 60 MG capsule       estradiol (CLIMARA) 0.1 MG/24HR       PREMARIN 1.25 MG tablet       pravastatin (PRAVACHOL) 20 MG tablet       levothyroxine (SYNTHROID) 88 MCG tablet       levETIRAcetam (KEPPRA) 500 MG tablet Take 1 tablet by mouth 2 times daily (Patient not taking: Reported on 12/30/2021) 60 tablet 2    promethazine (PHENERGAN) 25 MG tablet  (Patient not taking: Reported on 12/30/2021)  5    zolpidem (AMBIEN CR) 12.5 MG CR tablet  (Patient not taking: Reported on 12/30/2021)      PROMETHEGAN 25 MG suppository  (Patient not taking: Reported on 12/30/2021)       No current facility-administered medications for this visit. BP (!) 163/97   Pulse 70   Resp 20   Ht 5' 3\" (1.6 m)   Wt 120 lb (54.4 kg)   SpO2 97%   BMI 21.26 kg/m²     Constitutional - well developed, well nourished. HENT - head normocephalic. Eyes - conjunctiva normal.  EOMS normal.  Neck- ROM appears normal, no tracheal deviation. Extremities -no edema     Musculoskeletal - ROM appears normal.  No significant edema. Skin - warm, dry, and intact. No rash, erythema, or pallor. Psychiatric - mood, affect, and behavior appear normal.      Neurological exam  Awake, alert, fluent  appropriate affect  Speech normal without dysarthria    Cranial Nerve Exam    EOMI, No nystagmus, conjugate eye movements, no ptosis  Symmetric facies    Motor Exam  Antigravity throughout      Tremors- no tremors in hands or head noted    Gait  Normal base and speed  No ataxia      No results found for: [id number]  No results found for: WBC, HGB, HCT, MCV, PLT  No results found for: NA, K, CL, CO2, BUN, CREATININE, GLUCOSE, CALCIUM, PROT, LABALBU, BILITOT, ALKPHOS, AST, ALT, LABGLOM, GFRAA, AGRATIO, GLOB        Assessment    ICD-10-CM    1. Nonintractable headache, unspecified chronicity pattern, unspecified headache type  R51.9    2. Other chronic pain  G89.29    3. Pain, neck  M54.2    4.  Migraine without status migrainosus, not intractable, unspecified migraine type  G43.909        Her neurological examination today was significant for some decreased light touch over the third and fourth fingers of her right hand. Based upon her history and examination, I suspect that much of her symptoms are related to musculoskeletal injury and strain. Her MRI of the brain, MRA of the head, MRI of the cervical and lumbosacral spine were unremarkable. Her EMG with nerve conduction study of her right arm and legs was consistent with a mild carpal tunnel syndrome. She was continued with Soma for her neck discomfort and referred to physical therapy. She was given a script for Flector patches to be applied to her neck or back to see if this provides any benefit. She is getting her nerve blocks. We discussed referral to pain management but she did not wish to pursue this. She did not wish to initiate any new medications. Her venous US of the legs were unremarkable. Her MRI of the C and LS spine was stable. The patient indicated understanding of the diagnosis and treatment plan. She is to follow up with me in approximately 3 month and call with any further problems. continue present care as medicine is keeping things stable. Continue present care    Plan    No orders of the defined types were placed in this encounter. No orders of the defined types were placed in this encounter. Return in about 3 months (around 3/30/2022).

## 2022-01-17 ENCOUNTER — HOSPITAL ENCOUNTER (OUTPATIENT)
Dept: PAIN MANAGEMENT | Age: 57
Discharge: HOME | End: 2022-01-17
Payer: MEDICARE

## 2022-01-17 VITALS
OXYGEN SATURATION: 96 % | SYSTOLIC BLOOD PRESSURE: 169 MMHG | RESPIRATION RATE: 20 BRPM | HEART RATE: 63 BPM | DIASTOLIC BLOOD PRESSURE: 88 MMHG | TEMPERATURE: 98 F

## 2022-01-17 PROCEDURE — 64405 NJX AA&/STRD GR OCPL NRV: CPT

## 2022-01-17 PROCEDURE — 64450 NJX AA&/STRD OTHER PN/BRANCH: CPT | Performed by: PSYCHIATRY & NEUROLOGY

## 2022-01-17 PROCEDURE — 2500000003 HC RX 250 WO HCPCS

## 2022-01-17 PROCEDURE — 64405 NJX AA&/STRD GR OCPL NRV: CPT | Performed by: PSYCHIATRY & NEUROLOGY

## 2022-01-17 RX ORDER — BUPIVACAINE HYDROCHLORIDE 2.5 MG/ML
5 INJECTION, SOLUTION EPIDURAL; INFILTRATION; INTRACAUDAL ONCE
Status: DISCONTINUED | OUTPATIENT
Start: 2022-01-17 | End: 2022-01-19 | Stop reason: HOSPADM

## 2022-01-17 NOTE — PROGRESS NOTES
Procedure:  Level of Consciousness: [x]Alert [x]Oriented []Disoriented []Lethargic  Anxiety Level: [x]Calm []Anxious []Depressed []Other  Skin: []Warm [x]Dry []Cool []Moist []Intact []Other  Cardiovascular: [x]Palpitations: [x]Never []Occasionally []Frequently  Chest Pain: [x]No []Yes  Respiratory:  [x]Unlabored []Labored []Cough ([] Productive []Unproductive)  HCG Required: [x]No []Yes   Results: []Negative []Positive  Knowledge Level:        [x]Patient/Other verbalized understanding of pre-procedure instructions. [x]Assessment of post-op care needs (transportation, responsible caregiver)        [x]Able to discuss health care problems and how to deal with it. Factors that Affect Teaching:        Language Barrier: [x]No []Yes - why:        Hearing Loss:        [x]No []Yes            Corrective Device:  []Yes []No        Vision Loss:           [x]No []Yes            Corrective Device:  []Yes []No        Memory Loss:       [x]No []Yes            []Short Term []Long Term  Motivational Level:  [x]Asks Questions                  []Extremely Anxious       [x]Seems Interested               []Seems Uninterested                  [x]Denies need for Education  Risk for Injury:  [x]Patient oriented to person, place and time  []History of frequent falls/loss of balance  Nutritional:  []Change in appetite   []Weight Gain   []Weight Loss  Functional:  []Requires assistance with ADL'sPatient states that he/she has __30____ headaches out of 30 days each month.   Patient states that he/she has __6____ migraines out of 30 days each month.monthly

## 2022-03-01 ENCOUNTER — HOSPITAL ENCOUNTER (OUTPATIENT)
Dept: GENERAL RADIOLOGY | Facility: HOSPITAL | Age: 57
Discharge: HOME | End: 2022-03-01
Admitting: NURSE PRACTITIONER

## 2022-03-01 PROCEDURE — 73030 X-RAY EXAM OF SHOULDER: CPT

## 2022-03-07 ENCOUNTER — HOSPITAL ENCOUNTER (OUTPATIENT)
Dept: PAIN MANAGEMENT | Age: 57
Discharge: HOME | End: 2022-03-07
Payer: MEDICARE

## 2022-03-07 VITALS
DIASTOLIC BLOOD PRESSURE: 90 MMHG | OXYGEN SATURATION: 96 % | RESPIRATION RATE: 20 BRPM | TEMPERATURE: 97.9 F | SYSTOLIC BLOOD PRESSURE: 170 MMHG | HEART RATE: 67 BPM

## 2022-03-07 DIAGNOSIS — R51.9 NONINTRACTABLE HEADACHE, UNSPECIFIED CHRONICITY PATTERN, UNSPECIFIED HEADACHE TYPE: Primary | ICD-10-CM

## 2022-03-07 PROCEDURE — 64405 NJX AA&/STRD GR OCPL NRV: CPT

## 2022-03-07 PROCEDURE — 2500000003 HC RX 250 WO HCPCS

## 2022-03-07 PROCEDURE — 64615 CHEMODENERV MUSC MIGRAINE: CPT

## 2022-03-07 PROCEDURE — 64450 NJX AA&/STRD OTHER PN/BRANCH: CPT | Performed by: PSYCHIATRY & NEUROLOGY

## 2022-03-07 PROCEDURE — 64405 NJX AA&/STRD GR OCPL NRV: CPT | Performed by: PSYCHIATRY & NEUROLOGY

## 2022-03-07 RX ORDER — BUPIVACAINE HYDROCHLORIDE 2.5 MG/ML
5 INJECTION, SOLUTION EPIDURAL; INFILTRATION; INTRACAUDAL ONCE
Status: DISCONTINUED | OUTPATIENT
Start: 2022-03-07 | End: 2022-03-09 | Stop reason: HOSPADM

## 2022-03-07 RX ORDER — OXYCODONE AND ACETAMINOPHEN 10; 325 MG/1; MG/1
1 TABLET ORAL EVERY 6 HOURS PRN
Qty: 45 TABLET | Refills: 0 | Status: SHIPPED | OUTPATIENT
Start: 2022-03-07 | End: 2022-03-31 | Stop reason: SDUPTHER

## 2022-03-31 ENCOUNTER — OFFICE VISIT (OUTPATIENT)
Dept: NEUROLOGY | Age: 57
End: 2022-03-31
Payer: MEDICARE

## 2022-03-31 VITALS
HEIGHT: 63 IN | DIASTOLIC BLOOD PRESSURE: 74 MMHG | WEIGHT: 120 LBS | BODY MASS INDEX: 21.26 KG/M2 | OXYGEN SATURATION: 98 % | SYSTOLIC BLOOD PRESSURE: 128 MMHG | HEART RATE: 84 BPM

## 2022-03-31 DIAGNOSIS — M54.2 PAIN, NECK: ICD-10-CM

## 2022-03-31 DIAGNOSIS — G89.29 OTHER CHRONIC PAIN: ICD-10-CM

## 2022-03-31 DIAGNOSIS — R51.9 NONINTRACTABLE HEADACHE, UNSPECIFIED CHRONICITY PATTERN, UNSPECIFIED HEADACHE TYPE: Primary | ICD-10-CM

## 2022-03-31 DIAGNOSIS — G43.909 MIGRAINE WITHOUT STATUS MIGRAINOSUS, NOT INTRACTABLE, UNSPECIFIED MIGRAINE TYPE: ICD-10-CM

## 2022-03-31 PROCEDURE — 99213 OFFICE O/P EST LOW 20 MIN: CPT | Performed by: PSYCHIATRY & NEUROLOGY

## 2022-03-31 RX ORDER — BUSPIRONE HYDROCHLORIDE 5 MG/1
1 TABLET ORAL DAILY
COMMUNITY
Start: 2022-03-03

## 2022-03-31 RX ORDER — GABAPENTIN 800 MG/1
1 TABLET ORAL 3 TIMES DAILY
COMMUNITY
Start: 2022-03-10

## 2022-03-31 RX ORDER — OXYCODONE AND ACETAMINOPHEN 10; 325 MG/1; MG/1
1 TABLET ORAL EVERY 6 HOURS PRN
Qty: 45 TABLET | Refills: 0 | Status: SHIPPED | OUTPATIENT
Start: 2022-04-06 | End: 2022-05-02 | Stop reason: SDUPTHER

## 2022-03-31 RX ORDER — ROPINIROLE 2 MG/1
1 TABLET, FILM COATED ORAL DAILY
COMMUNITY
Start: 2022-02-22

## 2022-03-31 RX ORDER — CARISOPRODOL 350 MG/1
1 TABLET ORAL 3 TIMES DAILY
COMMUNITY
Start: 2022-03-17 | End: 2022-06-03 | Stop reason: SDUPTHER

## 2022-03-31 NOTE — PROGRESS NOTES
Libia Garcia is a 55y.o. year old female who is seen for evaluation of multiple complaints. 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 One day she was taking some hot items out of the oven when something burnt her and she turned her head quickly. She heard a popping noise and following this began to have significant neck pain. With time the pain improved. She then once again turned her head quickly and heard a popping noise. Her headaches returned once again. The headache involves the back of her head with no associated photophobia, phonophobia, or nausea. In addition she has migraines approximately once her week following her hysterectomy. These can last for up to two days. She has been tried on multiple medications in the past for migraine prophylaxis including propranolol, Topamax, and Elavil and Depakote. She complaints of numbness in the third and fourth fingers of her right hand at times. She has significant neck and back pain. She continues to get nerve blocks which lasts for 3 to 4 weeks. Hands better. Chief complaint: neck pain  .     Active Ambulatory Problems     Diagnosis Date Noted    Breast lesion, right 11/22/2013    Breast mass, right 11/22/2013    Costochondral chest pain 11/22/2013    Pain, neck 06/13/2016    Migraine without status migrainosus, not intractable 06/13/2016    Headache disorder 06/13/2016    Chronic pain 07/20/2016    Voice hoarseness 01/25/2017    Nonintractable headache 05/22/2019     Resolved Ambulatory Problems     Diagnosis Date Noted    No Resolved Ambulatory Problems     Past Medical History:   Diagnosis Date    Chronic back pain     Depression     Fibromyalgia     Headache     Hyperlipidemia     Hypertension     Hypothyroidism     Neuropathy        Past Surgical History:   Procedure Laterality Date    APPENDECTOMY      age of [age]    HYSTERECTOMY      8 yrs ago TOTAL        Family History   Problem Relation Age of Onset    Cancer Mother         ovarian    Diabetes Mother     Cancer Father     High Blood Pressure Brother     Diabetes Maternal Grandmother     Cancer Other         bladder       No Known Allergies    Social History     Socioeconomic History    Marital status: Single     Spouse name: Not on file    Number of children: Not on file    Years of education: Not on file    Highest education level: Not on file   Occupational History    Not on file   Tobacco Use    Smoking status: Current Every Day Smoker     Packs/day: 1.00     Years: 20.00     Pack years: 20.00    Smokeless tobacco: Never Used   Vaping Use    Vaping Use: Never used   Substance and Sexual Activity    Alcohol use: Yes     Comment: occasional     Drug use: No    Sexual activity: Never   Other Topics Concern    Not on file   Social History Narrative    Not on file     Social Determinants of Health     Financial Resource Strain:     Difficulty of Paying Living Expenses: Not on file   Food Insecurity:     Worried About Running Out of Food in the Last Year: Not on file    [place] of Food in the Last Year: Not on file   Transportation Needs:     Lack of Transportation (Medical): Not on file    Lack of Transportation (Non-Medical):  Not on file   Physical Activity:     Days of Exercise per Week: Not on file    Minutes of Exercise per Session: Not on file   Stress:     Feeling of Stress : Not on file   Social Connections:     Frequency of Communication with Friends and Family: Not on file    Frequency of Social Gatherings with Friends and Family: Not on file    Attends Yazdanism Services: Not on file    Active Member of Clubs or Organizations: Not on file    Attends Club or Organization Meetings: Not on file    Marital Status: Not on file   Intimate Partner Violence:     Fear of Current or Ex-Partner: Not on file    Emotionally Abused: Not on file    Physically Abused: Not on file    Sexually Abused: Not on file   Housing Stability:     Unable to Pay for Housing in the Last Year: Not on file    Number of Places Lived in the Last Year: Not on file    Unstable Housing in the Last Year: Not on file     Review of Systems     Constitutional - No fever or chills. No diaphoresis or significant fatigue. HENT -  No tinnitus or significant hearing loss. Eyes - no sudden vision change or eye pain  Respiratory - no significant shortness of breath or cough  Cardiovascular - no chest pain No palpitations or significant leg swelling  Gastrointestinal - no abdominal swelling or pain. Genitourinary - No difficulty urinating, dysuria  Musculoskeletal - no back pain or myalgia. Skin - no color change or rash  Neurologic - No seizures. No lateralizing weakness. Hematologic - no easy bruising or excessive bleeding. Psychiatric - no severe anxiety or nervousness. All other review of systems are negative. Current Outpatient Medications   Medication Sig Dispense Refill    busPIRone (BUSPAR) 5 MG tablet Take 1 tablet by mouth daily      carisoprodol (SOMA) 350 MG tablet Take 1 tablet by mouth 3 times daily.  gabapentin (NEURONTIN) 800 MG tablet Take 1 tablet by mouth 3 times daily.  rOPINIRole (REQUIP) 2 MG tablet Take 1 tablet by mouth daily      [START ON 4/6/2022] oxyCODONE-acetaminophen (PERCOCET)  MG per tablet Take 1 tablet by mouth every 6 hours as needed for Pain for up to 30 days.  Intended supply: 30 days 45 tablet 0    levETIRAcetam (KEPPRA) 500 MG tablet Take 1 tablet by mouth 2 times daily 60 tablet 2    butalbital-acetaminophen-caffeine (FIORICET, ESGIC) -40 MG per tablet Take 1 tablet by mouth every 4 hours as needed for Headaches      benztropine (COGENTIN) 1 MG tablet Take 1 mg by mouth daily      fenofibrate 160 MG tablet   3    promethazine (PHENERGAN) 25 MG tablet   5    lisinopril (PRINIVIL;ZESTRIL) 5 MG tablet       ABILIFY 5 MG tablet       clonazePAM (KLONOPIN) 0.5 MG tablet 3 times daily as needed  CYMBALTA 60 MG capsule       estradiol (CLIMARA) 0.1 MG/24HR       PREMARIN 1.25 MG tablet       zolpidem (AMBIEN CR) 12.5 MG CR tablet       PROMETHEGAN 25 MG suppository       pravastatin (PRAVACHOL) 20 MG tablet       levothyroxine (SYNTHROID) 88 MCG tablet        No current facility-administered medications for this visit. /74   Pulse 84   Ht 5' 3\" (1.6 m)   Wt 120 lb (54.4 kg)   SpO2 98%   BMI 21.26 kg/m²     Constitutional - well developed, well nourished. HENT - head normocephalic. Eyes - conjunctiva normal.  EOMS normal.  Neck- ROM appears normal, no tracheal deviation. Extremities -no edema     Musculoskeletal - ROM appears normal.  No significant edema. Skin - warm, dry, and intact. No rash, erythema, or pallor. Psychiatric - mood, affect, and behavior appear normal.      Neurological exam  Awake, alert, fluent  appropriate affect  Speech normal without dysarthria    Cranial Nerve Exam    EOMI, No nystagmus, conjugate eye movements, no ptosis  Symmetric facies    Motor Exam  Antigravity throughout      Tremors- no tremors in hands or head noted    Gait  Normal base and speed  No ataxia      No results found for: [id number]  No results found for: WBC, HGB, HCT, MCV, PLT  No results found for: NA, K, CL, CO2, BUN, CREATININE, GLUCOSE, CALCIUM, PROT, LABALBU, BILITOT, ALKPHOS, AST, ALT, LABGLOM, GFRAA, AGRATIO, GLOB        Assessment    ICD-10-CM    1. Nonintractable headache, unspecified chronicity pattern, unspecified headache type  R51.9 oxyCODONE-acetaminophen (PERCOCET)  MG per tablet   2. Other chronic pain  G89.29    3. Pain, neck  M54.2    4. Migraine without status migrainosus, not intractable, unspecified migraine type  G43.909        Her neurological examination initially was significant for some decreased light touch over the third and fourth fingers of her right hand.  Based upon her history and examination, I suspect that much of her symptoms are related

## 2022-04-04 ENCOUNTER — HOSPITAL ENCOUNTER (OUTPATIENT)
Dept: PAIN MANAGEMENT | Age: 57
Discharge: HOME | End: 2022-04-04
Payer: MEDICARE

## 2022-04-04 VITALS
RESPIRATION RATE: 18 BRPM | TEMPERATURE: 96.6 F | SYSTOLIC BLOOD PRESSURE: 173 MMHG | DIASTOLIC BLOOD PRESSURE: 92 MMHG | OXYGEN SATURATION: 97 % | HEART RATE: 67 BPM

## 2022-04-04 PROCEDURE — 64405 NJX AA&/STRD GR OCPL NRV: CPT | Performed by: PSYCHIATRY & NEUROLOGY

## 2022-04-04 PROCEDURE — 64450 NJX AA&/STRD OTHER PN/BRANCH: CPT | Performed by: PSYCHIATRY & NEUROLOGY

## 2022-04-04 PROCEDURE — 64405 NJX AA&/STRD GR OCPL NRV: CPT

## 2022-04-04 PROCEDURE — 2500000003 HC RX 250 WO HCPCS

## 2022-04-04 RX ORDER — BUPIVACAINE HYDROCHLORIDE 2.5 MG/ML
5 INJECTION, SOLUTION EPIDURAL; INFILTRATION; INTRACAUDAL ONCE
Status: DISCONTINUED | OUTPATIENT
Start: 2022-04-04 | End: 2022-04-06 | Stop reason: HOSPADM

## 2022-05-02 ENCOUNTER — HOSPITAL ENCOUNTER (OUTPATIENT)
Dept: PAIN MANAGEMENT | Age: 57
Discharge: HOME | End: 2022-05-02
Payer: MEDICARE

## 2022-05-02 VITALS
RESPIRATION RATE: 18 BRPM | DIASTOLIC BLOOD PRESSURE: 94 MMHG | OXYGEN SATURATION: 99 % | TEMPERATURE: 96.4 F | HEART RATE: 66 BPM | SYSTOLIC BLOOD PRESSURE: 171 MMHG

## 2022-05-02 DIAGNOSIS — R51.9 NONINTRACTABLE HEADACHE, UNSPECIFIED CHRONICITY PATTERN, UNSPECIFIED HEADACHE TYPE: ICD-10-CM

## 2022-05-02 PROCEDURE — 64450 NJX AA&/STRD OTHER PN/BRANCH: CPT | Performed by: PSYCHIATRY & NEUROLOGY

## 2022-05-02 PROCEDURE — 2500000003 HC RX 250 WO HCPCS

## 2022-05-02 PROCEDURE — 64405 NJX AA&/STRD GR OCPL NRV: CPT | Performed by: PSYCHIATRY & NEUROLOGY

## 2022-05-02 PROCEDURE — 64405 NJX AA&/STRD GR OCPL NRV: CPT

## 2022-05-02 RX ORDER — BUPIVACAINE HYDROCHLORIDE 2.5 MG/ML
5 INJECTION, SOLUTION EPIDURAL; INFILTRATION; INTRACAUDAL ONCE
Status: DISCONTINUED | OUTPATIENT
Start: 2022-05-02 | End: 2022-05-03 | Stop reason: HOSPADM

## 2022-05-02 RX ORDER — OXYCODONE AND ACETAMINOPHEN 10; 325 MG/1; MG/1
1 TABLET ORAL EVERY 6 HOURS PRN
Qty: 45 TABLET | Refills: 0 | Status: SHIPPED | OUTPATIENT
Start: 2022-05-06 | End: 2022-06-03 | Stop reason: SDUPTHER

## 2022-06-02 ENCOUNTER — TELEPHONE (OUTPATIENT)
Dept: NEUROLOGY | Age: 57
End: 2022-06-02

## 2022-06-02 NOTE — TELEPHONE ENCOUNTER
Patient called in today wanting us to change her pharmacies for all her medications due to [facility] not getting her medication to her on time. Patient requested that we sent to [facility]. I informed patient that she would need to come in office and sign a new medication contract then we could get refill request to the provider.

## 2022-06-03 DIAGNOSIS — R51.9 NONINTRACTABLE HEADACHE, UNSPECIFIED CHRONICITY PATTERN, UNSPECIFIED HEADACHE TYPE: ICD-10-CM

## 2022-06-03 RX ORDER — OXYCODONE AND ACETAMINOPHEN 10; 325 MG/1; MG/1
1 TABLET ORAL EVERY 6 HOURS PRN
Qty: 45 TABLET | Refills: 0 | Status: SHIPPED | OUTPATIENT
Start: 2022-06-05 | End: 2022-07-11 | Stop reason: SDUPTHER

## 2022-06-03 RX ORDER — CARISOPRODOL 350 MG/1
350 TABLET ORAL 3 TIMES DAILY
Qty: 90 TABLET | Refills: 5 | Status: SHIPPED | OUTPATIENT
Start: 2022-06-11 | End: 2022-06-10 | Stop reason: SDUPTHER

## 2022-06-03 RX ORDER — BUTALBITAL, ACETAMINOPHEN AND CAFFEINE 50; 325; 40 MG/1; MG/1; MG/1
1 TABLET ORAL EVERY 4 HOURS PRN
Qty: 180 TABLET | Refills: 0 | Status: SHIPPED | OUTPATIENT
Start: 2022-06-11 | End: 2022-06-10 | Stop reason: SDUPTHER

## 2022-06-03 NOTE — TELEPHONE ENCOUNTER
Requested Prescriptions     Pending Prescriptions Disp Refills    oxyCODONE-acetaminophen (PERCOCET)  MG per tablet 45 tablet 0     Sig: Take 1 tablet by mouth every 6 hours as needed for Pain for up to 30 days. Intended supply: 30 days    carisoprodol (SOMA) 350 MG tablet 90 tablet 5     Sig: Take 1 tablet by mouth 3 times daily for 30 days.     butalbital-acetaminophen-caffeine (FIORICET, ESGIC) -40 MG per tablet 180 tablet 0     Sig: Take 1 tablet by mouth every 4 hours as needed for Headaches       Last Office Visit:  3/31/2022  Next Office Visit:  6/30/2022  Last Medication Refill:    Percocet 5/6/2022   Soma 3/17/2022  Fioricet 5/12/2022     Frankie Carrera up to date:  6/3/2022    *RX updated to reflect   Percocet 6/5/2022 Soma & Fioricet 6/11/2022  fill date*      Patient is requesting normal Fioricet without codeine due to pharmacy not being able to get medication         New medication contract has been signed and given to [email or web address]

## 2022-06-09 DIAGNOSIS — R51.9 NONINTRACTABLE HEADACHE, UNSPECIFIED CHRONICITY PATTERN, UNSPECIFIED HEADACHE TYPE: ICD-10-CM

## 2022-06-09 NOTE — TELEPHONE ENCOUNTER
Requested Prescriptions     Pending Prescriptions Disp Refills    carisoprodol (SOMA) 350 MG tablet 90 tablet 5     Sig: Take 1 tablet by mouth 3 times daily for 30 days.  butalbital-acetaminophen-caffeine (FIORICET, ESGIC) -40 MG per tablet 180 tablet 0     Sig: Take 1 tablet by mouth every 4 hours as needed for Headaches       Last Office Visit:  3/31/2022  Next Office Visit:  6/30/2022  Last Medication Refill:    Soma 3/17/2022  Fioricet 5/12/2022       Rx sent to [facility] was canceled due to them not being able to get stock of medication. [facility] on [address] has generics or can get medication. Patient is aware this is a one time change of pharmacy due to medication contract saying [facility].        Yvon pt

## 2022-06-10 RX ORDER — CARISOPRODOL 350 MG/1
350 TABLET ORAL 3 TIMES DAILY
Qty: 90 TABLET | Refills: 5 | Status: SHIPPED | OUTPATIENT
Start: 2022-06-11 | End: 2022-06-13 | Stop reason: SDUPTHER

## 2022-06-10 RX ORDER — BUTALBITAL, ACETAMINOPHEN AND CAFFEINE 50; 325; 40 MG/1; MG/1; MG/1
1 TABLET ORAL EVERY 4 HOURS PRN
Qty: 180 TABLET | Refills: 0 | Status: SHIPPED | OUTPATIENT
Start: 2022-06-11 | End: 2022-06-13 | Stop reason: SDUPTHER

## 2022-06-13 RX ORDER — BUTALBITAL, ACETAMINOPHEN AND CAFFEINE 50; 325; 40 MG/1; MG/1; MG/1
1 TABLET ORAL EVERY 4 HOURS PRN
Qty: 180 TABLET | Refills: 5 | Status: SHIPPED
Start: 2022-06-13 | End: 2022-08-24

## 2022-06-13 RX ORDER — CARISOPRODOL 350 MG/1
350 TABLET ORAL 3 TIMES DAILY
Qty: 90 TABLET | Refills: 5 | Status: SHIPPED | OUTPATIENT
Start: 2022-06-13 | End: 2022-08-25 | Stop reason: SDUPTHER

## 2022-06-13 NOTE — TELEPHONE ENCOUNTER
Requested Prescriptions     Pending Prescriptions Disp Refills    butalbital-acetaminophen-caffeine (FIORICET, ESGIC) -40 MG per tablet 180 tablet 0     Sig: Take 1 tablet by mouth every 4 hours as needed for Headaches    carisoprodol (SOMA) 350 MG tablet 90 tablet 5     Sig: Take 1 tablet by mouth 3 times daily for 30 days. Last Office Visit:  3/31/2022  Next Office Visit:  6/30/2022  Last Medication Refill:    Soma 3/17/22  Fioricet 5/12/22    Anastasia Pelayo up to date:  6/3/22    *RX updated to reflect   6/13/22  fill date*    RX sent to [facility] was canceled due to them not being able to get the medication in stock. Pt is aware and asked to be sent to [facility]. Pt also aware this is a one time change due to contract stating [facility]

## 2022-06-27 ENCOUNTER — HOSPITAL ENCOUNTER (OUTPATIENT)
Dept: PAIN MANAGEMENT | Age: 57
Discharge: HOME | End: 2022-06-27
Payer: MEDICARE

## 2022-06-27 VITALS
RESPIRATION RATE: 18 BRPM | DIASTOLIC BLOOD PRESSURE: 92 MMHG | OXYGEN SATURATION: 97 % | TEMPERATURE: 97.7 F | SYSTOLIC BLOOD PRESSURE: 154 MMHG | HEART RATE: 91 BPM

## 2022-06-27 DIAGNOSIS — G89.29 OTHER CHRONIC PAIN: ICD-10-CM

## 2022-06-27 DIAGNOSIS — R51.9 NONINTRACTABLE HEADACHE, UNSPECIFIED CHRONICITY PATTERN, UNSPECIFIED HEADACHE TYPE: ICD-10-CM

## 2022-06-27 PROCEDURE — 64450 NJX AA&/STRD OTHER PN/BRANCH: CPT | Performed by: PSYCHIATRY & NEUROLOGY

## 2022-06-27 PROCEDURE — 2500000003 HC RX 250 WO HCPCS

## 2022-06-27 PROCEDURE — 64405 NJX AA&/STRD GR OCPL NRV: CPT | Performed by: PSYCHIATRY & NEUROLOGY

## 2022-06-27 PROCEDURE — 64405 NJX AA&/STRD GR OCPL NRV: CPT

## 2022-06-27 RX ORDER — SERTRALINE HYDROCHLORIDE 25 MG/1
25 TABLET, FILM COATED ORAL DAILY
COMMUNITY

## 2022-06-27 RX ORDER — BUTALBITAL, ACETAMINOPHEN, CAFFEINE AND CODEINE PHOSPHATE 50; 325; 40; 30 MG/1; MG/1; MG/1; MG/1
CAPSULE ORAL
Qty: 120 CAPSULE | Refills: 5 | Status: SHIPPED | OUTPATIENT
Start: 2022-06-27 | End: 2022-08-25 | Stop reason: SDUPTHER

## 2022-06-27 RX ORDER — NALOXONE HYDROCHLORIDE 4 MG/.1ML
1 SPRAY NASAL PRN
Qty: 1 EACH | Refills: 5 | Status: SHIPPED | OUTPATIENT
Start: 2022-06-27

## 2022-06-27 RX ORDER — BUPIVACAINE HYDROCHLORIDE 2.5 MG/ML
5 INJECTION, SOLUTION EPIDURAL; INFILTRATION; INTRACAUDAL ONCE
Status: DISCONTINUED | OUTPATIENT
Start: 2022-06-27 | End: 2022-06-29 | Stop reason: HOSPADM

## 2022-07-11 DIAGNOSIS — R51.9 NONINTRACTABLE HEADACHE, UNSPECIFIED CHRONICITY PATTERN, UNSPECIFIED HEADACHE TYPE: ICD-10-CM

## 2022-07-11 RX ORDER — OXYCODONE AND ACETAMINOPHEN 10; 325 MG/1; MG/1
1 TABLET ORAL EVERY 6 HOURS PRN
Qty: 45 TABLET | Refills: 0 | Status: SHIPPED | OUTPATIENT
Start: 2022-07-11 | End: 2022-08-01 | Stop reason: SDUPTHER

## 2022-07-11 NOTE — TELEPHONE ENCOUNTER
Requested Prescriptions     Pending Prescriptions Disp Refills    oxyCODONE-acetaminophen (PERCOCET)  MG per tablet 45 tablet 0     Sig: Take 1 tablet by mouth every 6 hours as needed for Pain for up to 30 days.  Intended supply: 30 days       Last Office Visit:  3/31/2022  Next Office Visit:  7/20/2022  Last Medication Refill:  6/5/22  Pallavi Collins up to date:  6/3/22    *RX updated to reflect   7/11/22  fill date*

## 2022-07-20 ENCOUNTER — OFFICE VISIT (OUTPATIENT)
Dept: NEUROLOGY | Age: 57
End: 2022-07-20
Payer: MEDICARE

## 2022-07-20 VITALS
WEIGHT: 124 LBS | HEART RATE: 80 BPM | BODY MASS INDEX: 21.97 KG/M2 | DIASTOLIC BLOOD PRESSURE: 88 MMHG | SYSTOLIC BLOOD PRESSURE: 137 MMHG | HEIGHT: 63 IN

## 2022-07-20 DIAGNOSIS — G43.909 MIGRAINE WITHOUT STATUS MIGRAINOSUS, NOT INTRACTABLE, UNSPECIFIED MIGRAINE TYPE: ICD-10-CM

## 2022-07-20 DIAGNOSIS — R51.9 NONINTRACTABLE HEADACHE, UNSPECIFIED CHRONICITY PATTERN, UNSPECIFIED HEADACHE TYPE: Primary | ICD-10-CM

## 2022-07-20 DIAGNOSIS — M54.2 PAIN, NECK: ICD-10-CM

## 2022-07-20 PROCEDURE — 99213 OFFICE O/P EST LOW 20 MIN: CPT | Performed by: PSYCHIATRY & NEUROLOGY

## 2022-07-20 NOTE — PROGRESS NOTES
Milo Swanson is a 65y.o. year old female who is seen for evaluation of multiple complaints. 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 One day she was taking some hot items out of the oven when something burnt her and she turned her head quickly. She heard a popping noise and following this began to have significant neck pain. With time the pain improved. She then once again turned her head quickly and heard a popping noise. Her headaches returned once again. The headache involves the back of her head with no associated photophobia, phonophobia, or nausea. In addition she has migraines approximately once her week following her hysterectomy. These can last for up to two days. She has been tried on multiple medications in the past for migraine prophylaxis including propranolol, Topamax, and Elavil and Depakote. She complaints of numbness in the third and fourth fingers of her right hand at times. She has significant neck and back pain. She continues to get nerve blocks which lasts for 3 to 4 weeks. Hands better. Stable overall. Chief complaint: neck pain  .     Active Ambulatory Problems     Diagnosis Date Noted    Breast lesion, right 11/22/2013    Breast mass, right 11/22/2013    Costochondral chest pain 11/22/2013    Pain, neck 06/13/2016    Migraine without status migrainosus, not intractable 06/13/2016    Headache disorder 06/13/2016    Chronic pain 07/20/2016    Voice hoarseness 01/25/2017    Nonintractable headache 05/22/2019     Resolved Ambulatory Problems     Diagnosis Date Noted    No Resolved Ambulatory Problems     Past Medical History:   Diagnosis Date    Chronic back pain     Depression     Fibromyalgia     Headache     Hyperlipidemia     Hypertension     Hypothyroidism     Neuropathy        Past Surgical History:   Procedure Laterality Date    APPENDECTOMY      age of [age]    HYSTERECTOMY (CERVIX STATUS UNKNOWN)      8 yrs ago TOTAL        Family History   Problem Relation Age of Onset    Cancer Mother         ovarian    Diabetes Mother     Cancer Father     High Blood Pressure Brother     Diabetes Maternal Grandmother     Cancer Other         bladder       No Known Allergies    Social History     Socioeconomic History    Marital status: Single     Spouse name: Not on file    Number of children: Not on file    Years of education: Not on file    Highest education level: Not on file   Occupational History    Not on file   Tobacco Use    Smoking status: Every Day     Packs/day: 1.00     Years: 20.00     Pack years: 20.00     Types: Cigarettes    Smokeless tobacco: Never   Vaping Use    Vaping Use: Never used   Substance and Sexual Activity    Alcohol use: Yes     Comment: occasional     Drug use: No    Sexual activity: Never   Other Topics Concern    Not on file   Social History Narrative    Not on file     Social Determinants of Health     Financial Resource Strain: Not on file   Food Insecurity: Not on file   Transportation Needs: Not on file   Physical Activity: Not on file   Stress: Not on file   Social Connections: Not on file   Intimate Partner Violence: Not on file   Housing Stability: Not on file     Review of Systems     Constitutional - No fever or chills. No diaphoresis or significant fatigue. HENT -  No tinnitus or significant hearing loss. Eyes - no sudden vision change or eye pain  Respiratory - no significant shortness of breath or cough  Cardiovascular - no chest pain No palpitations or significant leg swelling  Gastrointestinal - no abdominal swelling or pain. Genitourinary - No difficulty urinating, dysuria  Musculoskeletal - no back pain or myalgia. Skin - no color change or rash  Neurologic - No seizures. No lateralizing weakness. Hematologic - no easy bruising or excessive bleeding. Psychiatric - no severe anxiety or nervousness. All other review of systems are negative.        Current Outpatient Medications   Medication Sig Dispense Refill oxyCODONE-acetaminophen (PERCOCET)  MG per tablet Take 1 tablet by mouth every 6 hours as needed for Pain for up to 30 days. Intended supply: 30 days 45 tablet 0    sertraline (ZOLOFT) 25 MG tablet Take 25 mg by mouth daily      butalbital-acetaminophen-caffeine-codeine (FIORICET WITH CODEINE) -78-30 MG per capsule 1 q 6 prn 120 capsule 5    naloxone 4 MG/0.1ML LIQD nasal spray 1 spray by Nasal route as needed for Opioid Reversal 1 each 5    busPIRone (BUSPAR) 5 MG tablet Take 1 tablet by mouth daily      gabapentin (NEURONTIN) 800 MG tablet Take 1 tablet by mouth 3 times daily. rOPINIRole (REQUIP) 2 MG tablet Take 1 tablet by mouth daily      levETIRAcetam (KEPPRA) 500 MG tablet Take 1 tablet by mouth 2 times daily 60 tablet 2    benztropine (COGENTIN) 1 MG tablet Take 1 mg by mouth daily      fenofibrate 160 MG tablet   3    promethazine (PHENERGAN) 25 MG tablet   5    lisinopril (PRINIVIL;ZESTRIL) 5 MG tablet       ABILIFY 5 MG tablet       clonazePAM (KLONOPIN) 0.5 MG tablet 3 times daily as needed       CYMBALTA 60 MG capsule       estradiol (CLIMARA) 0.1 MG/24HR       PREMARIN 1.25 MG tablet       zolpidem (AMBIEN CR) 12.5 MG CR tablet       PROMETHEGAN 25 MG suppository       pravastatin (PRAVACHOL) 20 MG tablet       levothyroxine (SYNTHROID) 88 MCG tablet       butalbital-acetaminophen-caffeine (FIORICET, ESGIC) -40 MG per tablet Take 1 tablet by mouth every 4 hours as needed for Headaches 180 tablet 5     No current facility-administered medications for this visit. /88   Pulse 80   Ht 5' 3\" (1.6 m)   Wt 124 lb (56.2 kg)   BMI 21.97 kg/m²     Constitutional - well developed, well nourished. HENT - head normocephalic. Eyes - conjunctiva normal.  EOMS normal.  Neck- ROM appears normal, no tracheal deviation. Extremities -no edema     Musculoskeletal - ROM appears normal.  No significant edema. Skin - warm, dry, and intact.   No rash, erythema, or pallor. Psychiatric - mood, affect, and behavior appear normal.      Neurological exam  Awake, alert, fluent  appropriate affect  Speech normal without dysarthria    Cranial Nerve Exam    EOMI, No nystagmus, conjugate eye movements, no ptosis  Symmetric facies    Motor Exam  Antigravity throughout      Tremors- no tremors in hands or head noted    Gait  Normal base and speed  No ataxia      No results found for: [id number]  No results found for: WBC, HGB, HCT, MCV, PLT  No results found for: NA, K, CL, CO2, BUN, CREATININE, GLUCOSE, CALCIUM, PROT, LABALBU, BILITOT, ALKPHOS, AST, ALT, LABGLOM, GFRAA, AGRATIO, GLOB        Assessment    ICD-10-CM    1. Nonintractable headache, unspecified chronicity pattern, unspecified headache type  R51.9       2. Pain, neck  M54.2       3. Migraine without status migrainosus, not intractable, unspecified migraine type  G43.909             Her neurological examination initially was significant for some decreased light touch over the third and fourth fingers of her right hand. Based upon her history and examination, I suspect that much of her symptoms are related to musculoskeletal injury and strain. Her MRI of the brain, MRA of the head, MRI of the cervical and lumbosacral spine were unremarkable. Her EMG with nerve conduction study of her right arm and legs was consistent with a mild carpal tunnel syndrome. She was continued with Soma for her neck discomfort and referred to physical therapy. She was given a script for Flector patches to be applied to her neck or back to see if this provides any benefit. She is getting her nerve blocks. We discussed referral to pain management but she did not wish to pursue this. She did not wish to initiate any new medications. Her venous US of the legs were unremarkable. Her MRI of the C and LS spine was stable. The patient indicated understanding of the diagnosis and treatment plan.   She is to follow up with me in approximately 3 month and call with any further problems. . Continue current care as things stable. Continue present care    Plan    No orders of the defined types were placed in this encounter. No orders of the defined types were placed in this encounter. Return in about 3 months (around 10/20/2022).

## 2022-08-01 ENCOUNTER — HOSPITAL ENCOUNTER (OUTPATIENT)
Dept: PAIN MANAGEMENT | Age: 57
Discharge: HOME | End: 2022-08-01
Payer: MEDICARE

## 2022-08-01 VITALS
HEART RATE: 66 BPM | DIASTOLIC BLOOD PRESSURE: 78 MMHG | TEMPERATURE: 96.7 F | SYSTOLIC BLOOD PRESSURE: 146 MMHG | OXYGEN SATURATION: 96 % | RESPIRATION RATE: 18 BRPM

## 2022-08-01 DIAGNOSIS — R51.9 NONINTRACTABLE HEADACHE, UNSPECIFIED CHRONICITY PATTERN, UNSPECIFIED HEADACHE TYPE: ICD-10-CM

## 2022-08-01 PROCEDURE — 64405 NJX AA&/STRD GR OCPL NRV: CPT

## 2022-08-01 PROCEDURE — 2500000003 HC RX 250 WO HCPCS

## 2022-08-01 PROCEDURE — 64405 NJX AA&/STRD GR OCPL NRV: CPT | Performed by: PSYCHIATRY & NEUROLOGY

## 2022-08-01 RX ORDER — BUPIVACAINE HYDROCHLORIDE 2.5 MG/ML
5 INJECTION, SOLUTION EPIDURAL; INFILTRATION; INTRACAUDAL ONCE
Status: DISCONTINUED | OUTPATIENT
Start: 2022-08-01 | End: 2022-08-03 | Stop reason: HOSPADM

## 2022-08-01 RX ORDER — OXYCODONE AND ACETAMINOPHEN 10; 325 MG/1; MG/1
1 TABLET ORAL EVERY 6 HOURS PRN
Qty: 45 TABLET | Refills: 0 | Status: SHIPPED | OUTPATIENT
Start: 2022-08-10 | End: 2022-09-06 | Stop reason: SDUPTHER

## 2022-08-01 RX ORDER — OXYCODONE HYDROCHLORIDE 15 MG/1
15 TABLET ORAL EVERY 4 HOURS PRN
Qty: 30 TABLET | Refills: 0 | Status: SHIPPED
Start: 2022-08-01 | End: 2022-08-24

## 2022-08-01 NOTE — DISCHARGE INSTRUCTIONS
[address] [place] [address] Physical And Pain Medicine  Post Procedure Discharge Instructions        YOU HAVE HAD THE FOLLOWING PROCEDURE:                                  [x] Occipital Nerve Blocks  [] CTS wrist injection(s)  [] Knee Injection(s)         [] Shoulder Injection(s)   [] Elbow Injection(s)     [] Botox Injection  [] Cervical Trigger Point Injections    [] Thoracic Trigger Point Injections    [] Lumbar Trigger Point Injections  [] Piriformis Trigger Point Injections  [] SI Joint Injection(s)     [] Trochanteric Bursa Injection(s)       [] Ankle Injection(s)   [] Plantar Fasciitis   []  ______________  Injection(s) [] Botox []  Migraines [] Spasticity    YOU HAVE RECEIVED THE FOLLOWING MEDICATIONS IN YOUR INJECTION(s)  [] Lidocaine [x] Bupivacaine   [] DepoMedrol (steroid) [] Decadron (steroid)  []  Kenalog (steroid)   [] Toradol  [] Supartz [] Poppy Whiteheadg    [] Botox        PATIENT INFORMATION:   You may experience the following symptoms after your procedure. These symptoms are normal and should not cause concern: You may have an increase in your pain. This may last 24 - 48 hours after your procedure. You may have no change in the pain that you had prior to your injection(s). You may have weakness or numbness in your affected extremity. If this occurs, this may last until numbing the medication wears off. REPORT THE FOLLOWING SYMPTOMS TO YOUR DOCTOR:  Redness, swelling or drainage at the injection site(s)  Unusual pain that interferes with your normal activities of daily living. OTHER INSTRUCTIONS:    [x] I will apply ice to the injection site(s) for at least 24 hours after the procedure. I will rotate the ice on for 20 minutes and off for 20 minutes for at least 24 hours. [x] I will not apply heat for at least 48 hours and I will not take a hot bath or shower for at least 24 hours.      [x] I understand that if Lidocaine or Bupivacaine was used in my injection(s) that the injection site(s) will be numb for a few hours after the procedure    [x] I understand if a steroid was used it will take effect in 3 - 7 days. I understand that as the numbing medication wears off, the pain may return until the steroids start working. [x] I understand that today I will rest for the next 24 hours and drink plenty of water. [] For Botox for Migraines please do not wear anything constricting around the forehead for 7-10 days post injection. Examples headband, hats, or bandana    [] For Botox for Spasticity start therapy for the affected limb in two weeks. [] Additional instructions: ______________________________________________ ___________________________________________________________________    Sedation:  Was oral sedation given? [] Yes  [x] No    I understand that if I took an oral nerve calming medication I will not drive for  [] 24 hours after taking the medication.     [x] I have received a copy of my discharge instructions and understand the above instructions to the best of my knowledge    Patient Discharged:  [x] Home  [] Hospital  [] Other  ____________________________________________    Via:  [x] Ambulatory  [] Wheelchair   [] Stretcher [] EMS       Accompanied By:   [] Significant other  [] Family Member  [] Friend   [] Hospital Staff  []  Ambulance Staff  [] Other_______________________________________________    Plan:  [x] Will return to the office in   [x] 1 month   [] 3 months for:  [] Procedure Follow-up  [] Office Visit   [x] Planned Procedure      Patient Signature: _____________________________________________________    Staff Signature: _______________________________________________________        If you have questions, problems or concerns you may call [phone] and follow the prompts    Dr. Mariette Gaucher

## 2022-08-01 NOTE — PROGRESS NOTES
Procedure:  Level of Consciousness: [x]Alert [x]Oriented []Disoriented []Lethargic  Anxiety Level: [x]Calm []Anxious []Depressed []Other  Skin: [x]Warm [x]Dry []Cool []Moist []Intact []Other  Cardiovascular: [x]Palpitations: [x]Never []Occasionally []Frequently  Chest Pain: [x]No []Yes  Respiratory:  [x]Unlabored []Labored []Cough ([] Productive []Unproductive)  HCG Required: [x]No []Yes   Results: []Negative []Positive  Knowledge Level:        [x]Patient/Other verbalized understanding of pre-procedure instructions. [x]Assessment of post-op care needs (transportation, responsible caregiver)        [x]Able to discuss health care problems and how to deal with it.   Factors that Affect Teaching:        Language Barrier: No []Yes - why:        Hearing Loss:        [x][]No []Yes            Corrective Device:  []Yes []No        Vision Loss:           [x]No []Yes            Corrective Device:  []Yes []No        Memory Loss:       [x]No []Yes            []Short Term []Long Term  Motivational Level:  [x]Asks Questions                  []Extremely Anxious       [x]Seems Interested               []Seems Uninterested                  [x]Denies need for Education  Risk for Injury:  [x]Patient oriented to person, place and time  []History of frequent falls/loss of balance  Nutritional:  []Change in appetite   []Weight Gain   []Weight Loss  Functional:  []Requires assistance with ADL's

## 2022-08-16 ENCOUNTER — TELEPHONE (OUTPATIENT)
Dept: NEUROLOGY | Age: 57
End: 2022-08-16

## 2022-08-16 NOTE — TELEPHONE ENCOUNTER
Joseph Ocampo requests that clinic return their call. The best time to reach her is Anytime. Joseph Ocampo stated that she is in a lot of pain and thinks that she should be taking 2 pain pills per day instead of one. Joseph Ocampo states that her pain pill is wearing off too soon and may need a different prescription. Please contact patient to advise. Thank you.

## 2022-08-16 NOTE — TELEPHONE ENCOUNTER
Patient is aware that I am sending her message to Dr. Vanessa Puri but he will not be back in the office until next week.

## 2022-08-22 ENCOUNTER — TELEPHONE (OUTPATIENT)
Dept: NEUROLOGY | Age: 57
End: 2022-08-22

## 2022-08-22 DIAGNOSIS — M25.511 BILATERAL SHOULDER PAIN, UNSPECIFIED CHRONICITY: Primary | ICD-10-CM

## 2022-08-22 DIAGNOSIS — M54.2 PAIN, NECK: Primary | ICD-10-CM

## 2022-08-22 DIAGNOSIS — M25.512 BILATERAL SHOULDER PAIN, UNSPECIFIED CHRONICITY: Primary | ICD-10-CM

## 2022-08-22 NOTE — TELEPHONE ENCOUNTER
I attempted to call pt to inform her she has refills of her Fioricet available at [facility] on [facility]note 2]. Script sent on 6-13-22 with 5 RF.  *Noted this is a one time send to [facility] due to signed contract stating [facility]. No answer and no secure vmail.

## 2022-08-22 NOTE — TELEPHONE ENCOUNTER
Chiquis Weathers requests that clinic return their call. The best time to reach her is Anytime. Chiquis Weathers returned a call from office. Chiquis Weathers stated that she needed a larger quantity for her headache medication requested. Thank you.

## 2022-08-22 NOTE — TELEPHONE ENCOUNTER
pt ask if Dr. Leslie Haskins order xray for right extermity, pt having pain around shoulder and down. Thank you    Please advise.

## 2022-08-22 NOTE — TELEPHONE ENCOUNTER
Pt called requesting to have Fioricet medication refill be sent to [address] [place] [id number]  [facility] [phone]    Also, pt ask if Dr. Neil Liter order xray for right extermity, pt having pain around shoulder and down.     Thank you

## 2022-08-22 NOTE — TELEPHONE ENCOUNTER
Pt called and asks for her Fioricet to be sent to [facility]. I informed pt she would need to come in the office and sign a new medication contract due to current signed contract showing [facility]ri[facility] Morales. Pt states she will be by on 8/23/22 to sign new contract. I informed pt we would send her script when it is due to [facility] as long as that is the pharmacy listed on her paper.

## 2022-08-22 NOTE — TELEPHONE ENCOUNTER
Pt called to request the refill  for fioricet goes to [facility] on  [address]. Per pt [facility] do not have in stock. Last refill at [facility] did fill full amount. Florencia call pt to clarify.

## 2022-08-22 NOTE — TELEPHONE ENCOUNTER
Called pt to inform her Dr. Ellyn Canavan placed orders for xray and mri. Pt states she wants these to go to [facility]. I faxed both orders to [facility] through [address] [place] [address] Rd.

## 2022-08-22 NOTE — TELEPHONE ENCOUNTER
I put an order for an MRI of the cervical spine. Taking 2 pills is too much. Which medication is most effective for her of all the different pain medicine she has tried? We will need to stick to 1.

## 2022-08-22 NOTE — TELEPHONE ENCOUNTER
Just spoke with Faraz Villa at [facility] where patients current active script is. Patient had called and reported that they didn't have medication in stock. This is is incorrect. Per pharmacy they do have medication in stock today but patient is trying to fill medication early. Patient can NOT fill medication for another 2 days per pharmacy. Tried to contact patient. No answer and not able to leave message.

## 2022-08-24 ENCOUNTER — TELEPHONE (OUTPATIENT)
Dept: NEUROLOGY | Age: 57
End: 2022-08-24

## 2022-08-24 DIAGNOSIS — G89.29 OTHER CHRONIC PAIN: ICD-10-CM

## 2022-08-24 DIAGNOSIS — R51.9 NONINTRACTABLE HEADACHE, UNSPECIFIED CHRONICITY PATTERN, UNSPECIFIED HEADACHE TYPE: ICD-10-CM

## 2022-08-24 NOTE — TELEPHONE ENCOUNTER
-diagnostic tests and consults completed and resulted. No  -vital signs normal or at patient baseline. Yes  -tolerating oral intake to maintain hydration. No. Patient reported nausea this am, was given zofran  -adequate pain control on oral analgesics. Yes  -safe disposition plan has been identified. No  Patient had a dulcolax suppository with small results, and pink lady enema with small results after.   If you set up Fioricet with codeine for when it is due I will sign off on it.

## 2022-08-24 NOTE — TELEPHONE ENCOUNTER
Patient called requesting a refill on her Soma and fioricet.  Patient has refills on file at pharmacy

## 2022-08-24 NOTE — TELEPHONE ENCOUNTER
Pt called asking for refill of Fiorcet with Codeine. Pt states she was only given 108 of the 120 last month due to a shortage at [address] [place] [address]. Pt was instructed by Ammy Mitchell at Pharmacy that she could  the remaining 12 after taking the full 108. Pt returned to pharmacy 3 days later demanding the remaining 12 pills stating they owed her those pills and she wasn't going to pay for them. Ammy Mitchell explained to pt she needed to wait until after the 108 were gone due to [employer]. Pt called us on 8/22/22 asking for refill to be sent to [facility]. I informed the pt she needed to come into the office and sign a new medication contract so we can send script to [facility] for her. Pt states understanding and would come on 8/23/22. Pt came and signed new contract yesterday on 8/23/22 for [facility]. After pt called today I called [address] [place] [address] and spoke with Freda. She states they gave the pt a \"short fill\" and informed me of the situation stated above and she also told me pt tried to fill both medications of Fiorcet and was told she could not do so. I told Freda that the patient has changed her medication agreement pharmacy and we needed to cancel all current scripts at their pharmacy. Freda states she will do so. Since pt is asking for a refill, which type of Fiorcet do you want her to have? She is requesting the one with Codeine.

## 2022-08-24 NOTE — TELEPHONE ENCOUNTER
Requested Prescriptions     Pending Prescriptions Disp Refills    butalbital-acetaminophen-caffeine-codeine (FIORICET WITH CODEINE) -80-30 MG per capsule 120 capsule 5     Si q 6 prn       Last Office Visit:  2022  Next Office Visit:  10/20/2022  Last Medication Refill:  22 with 5 RF  [facility] Friday up to date:  22    *RX updated to reflect   22  fill date*    *pt only filled quantity of 108 last script.

## 2022-08-25 ENCOUNTER — TELEPHONE (OUTPATIENT)
Dept: NEUROLOGY | Age: 57
End: 2022-08-25

## 2022-08-25 DIAGNOSIS — R51.9 NONINTRACTABLE HEADACHE, UNSPECIFIED CHRONICITY PATTERN, UNSPECIFIED HEADACHE TYPE: ICD-10-CM

## 2022-08-25 RX ORDER — CARISOPRODOL 350 MG/1
350 TABLET ORAL 3 TIMES DAILY
Qty: 90 TABLET | Refills: 5 | Status: SHIPPED | OUTPATIENT
Start: 2022-08-25 | End: 2022-09-21

## 2022-08-25 RX ORDER — BUTALBITAL, ACETAMINOPHEN, CAFFEINE AND CODEINE PHOSPHATE 50; 325; 40; 30 MG/1; MG/1; MG/1; MG/1
CAPSULE ORAL
Qty: 120 CAPSULE | Refills: 5 | Status: SHIPPED | OUTPATIENT
Start: 2022-08-25 | End: 2022-09-21 | Stop reason: SDUPTHER

## 2022-08-25 NOTE — TELEPHONE ENCOUNTER
Faxed pt's xray and mri order to [facility]. [facility] failed to fax last attempted.

## 2022-08-25 NOTE — TELEPHONE ENCOUNTER
Pt called and states her Fiorcet and Soma were sent to the wrong [facility]. She states she wanted them to be filled at the [place] [address] at [address]. After asking Flor Lew and Farzana Rodriguez I was told to have pt  prescriptions from the [facility] inside [facility] where they are currently sent to be filled at and allow her to get them from the other [facility] next month. I called pt to inform her and pt states understanding.

## 2022-08-25 NOTE — TELEPHONE ENCOUNTER
Cheyenne [facility] requests that office return their call. The best time to reach her is Anytime. Patient states [facility] did not receive medication refill request for Fioricet w/ codeine. She is upset because she has a migraine and can not get the rest of her pills since switching pharmacies she said shes going to be calling the pharmacy in about 30 mins to see if it has been received she does not want to make multiple trips back to pharmacy. Thank you.

## 2022-08-25 NOTE — TELEPHONE ENCOUNTER
Requested Prescriptions     Pending Prescriptions Disp Refills    carisoprodol (SOMA) 350 MG tablet 90 tablet 5     Sig: Take 1 tablet by mouth 3 times daily for 30 days. Last Office Visit:  7/20/2022  Next Office Visit:  10/20/2022  Last Medication Refill:  7/20/2022   Marija Banegas up to date:  up to date     *RX updated to reflect   8/25/2022  fill date*        Active script canceled at [facility] per patient to change to [facility].  Medication contract updated

## 2022-08-25 NOTE — TELEPHONE ENCOUNTER
Spoke with pharmacist at [facility] she confirmed receivable of scripts this AM however, scripts did not have pt identifying features on them. I confirmed correct dose and qty of medications and provided pharmacist pt SSN so she could complete the pt profile. Pharmacist confirmed that pt will have all scripts ready tomorrow between 9-10 AM. Pt also asked if orders could be resent to [facility] because they were never received, I voiced understanding and said orders would be promptly sent.

## 2022-09-06 ENCOUNTER — HOSPITAL ENCOUNTER (OUTPATIENT)
Dept: MRI IMAGING | Age: 57
Discharge: HOME | End: 2022-09-06
Payer: MEDICARE

## 2022-09-06 ENCOUNTER — HOSPITAL ENCOUNTER (OUTPATIENT)
Dept: GENERAL RADIOLOGY | Age: 57
Discharge: HOME | End: 2022-09-06
Payer: MEDICARE

## 2022-09-06 DIAGNOSIS — M25.512 BILATERAL SHOULDER PAIN, UNSPECIFIED CHRONICITY: ICD-10-CM

## 2022-09-06 DIAGNOSIS — M25.511 BILATERAL SHOULDER PAIN, UNSPECIFIED CHRONICITY: ICD-10-CM

## 2022-09-06 DIAGNOSIS — R51.9 NONINTRACTABLE HEADACHE, UNSPECIFIED CHRONICITY PATTERN, UNSPECIFIED HEADACHE TYPE: ICD-10-CM

## 2022-09-06 DIAGNOSIS — M54.2 PAIN, NECK: ICD-10-CM

## 2022-09-06 PROCEDURE — 73030 X-RAY EXAM OF SHOULDER: CPT | Performed by: RADIOLOGY

## 2022-09-06 PROCEDURE — 72141 MRI NECK SPINE W/O DYE: CPT

## 2022-09-06 PROCEDURE — 73030 X-RAY EXAM OF SHOULDER: CPT

## 2022-09-06 PROCEDURE — 72141 MRI NECK SPINE W/O DYE: CPT | Performed by: RADIOLOGY

## 2022-09-06 RX ORDER — OXYCODONE AND ACETAMINOPHEN 10; 325 MG/1; MG/1
1 TABLET ORAL EVERY 6 HOURS PRN
Qty: 45 TABLET | Refills: 0 | Status: SHIPPED | OUTPATIENT
Start: 2022-09-09 | End: 2022-10-05 | Stop reason: SDUPTHER

## 2022-09-06 NOTE — TELEPHONE ENCOUNTER
Leobardo Love has requested a refill on her medication. Last office visit : 7/20/2022   Next office visit : 10/20/2022   Last medication refill : 8/10/22  Marija Banegas : up to date       Requested Prescriptions     Pending Prescriptions Disp Refills    oxyCODONE-acetaminophen (PERCOCET)  MG per tablet 45 tablet 0     Sig: Take 1 tablet by mouth every 6 hours as needed for Pain for up to 30 days.  Intended supply: 30 days

## 2022-09-06 NOTE — TELEPHONE ENCOUNTER
Patient came into the office stating she needs a refill on her PERCOCET sent to [facility]. New med contract was signed.

## 2022-09-20 NOTE — TELEPHONE ENCOUNTER
Pt called to have [address] script sent to [address] [place] [address]. [facility]. I called to cancel script at the [facility] inside [place]. [facility] confirmed canceling script.

## 2022-09-21 ENCOUNTER — TELEPHONE (OUTPATIENT)
Dept: NEUROLOGY | Age: 57
End: 2022-09-21

## 2022-09-21 DIAGNOSIS — G89.29 OTHER CHRONIC PAIN: ICD-10-CM

## 2022-09-21 DIAGNOSIS — R51.9 NONINTRACTABLE HEADACHE, UNSPECIFIED CHRONICITY PATTERN, UNSPECIFIED HEADACHE TYPE: ICD-10-CM

## 2022-09-21 RX ORDER — BUTALBITAL, ACETAMINOPHEN, CAFFEINE AND CODEINE PHOSPHATE 50; 325; 40; 30 MG/1; MG/1; MG/1; MG/1
CAPSULE ORAL
Qty: 120 CAPSULE | Refills: 5 | Status: SHIPPED | OUTPATIENT
Start: 2022-09-21 | End: 2022-10-20

## 2022-09-21 RX ORDER — CARISOPRODOL 350 MG/1
TABLET ORAL
Qty: 90 TABLET | Refills: 0 | Status: SHIPPED | OUTPATIENT
Start: 2022-09-21 | End: 2022-10-21

## 2022-09-21 NOTE — TELEPHONE ENCOUNTER
Pharmacy called needing a new script for her [address] sent to [facility]. Please cornelius othere script had 5 refills on it but it was at the other [facility] will call the other pharmacy and cancel the script .  Patient is not picking up script until Saturday    Requested Prescriptions     Pending Prescriptions Disp Refills    carisoprodol (SOMA) 350 MG tablet [Pharmacy Med Name: CARISOPRODOL 350MG TABLET] 90 tablet 0     Sig: TAKE (1) TABLET BY MOUTH THREE TIMES DAILY AS NEEDED    butalbital-acetaminophen-caffeine-codeine (FIORICET WITH CODEINE) -19-30 MG per capsule 120 capsule 5     Si q 6 prn       Last Office Visit:  2022  Next Office Visit:  10/20/2022  Last Medication Refill:  2022   Tristan Gums up to date:  2022    *RX updated to reflect   2022  fill date*

## 2022-09-21 NOTE — TELEPHONE ENCOUNTER
Dr. Jesús Valentine office called stating they had filled patients soma in error and that they were wanting Dr. Mahi Sim to continue that management. Called [facility] and spoke with abbey where active script is and patient does have 5 refills on file for her soma prescription.

## 2022-09-26 ENCOUNTER — HOSPITAL ENCOUNTER (OUTPATIENT)
Dept: PAIN MANAGEMENT | Age: 57
Discharge: HOME | End: 2022-09-26
Payer: MEDICARE

## 2022-09-26 VITALS
HEART RATE: 73 BPM | SYSTOLIC BLOOD PRESSURE: 147 MMHG | DIASTOLIC BLOOD PRESSURE: 82 MMHG | OXYGEN SATURATION: 94 % | RESPIRATION RATE: 18 BRPM | TEMPERATURE: 96.7 F

## 2022-09-26 DIAGNOSIS — R51.9 NONINTRACTABLE HEADACHE, UNSPECIFIED CHRONICITY PATTERN, UNSPECIFIED HEADACHE TYPE: ICD-10-CM

## 2022-09-26 PROCEDURE — 64405 NJX AA&/STRD GR OCPL NRV: CPT

## 2022-09-26 PROCEDURE — 2500000003 HC RX 250 WO HCPCS

## 2022-09-26 PROCEDURE — 64450 NJX AA&/STRD OTHER PN/BRANCH: CPT | Performed by: PSYCHIATRY & NEUROLOGY

## 2022-09-26 PROCEDURE — 64405 NJX AA&/STRD GR OCPL NRV: CPT | Performed by: PSYCHIATRY & NEUROLOGY

## 2022-09-26 RX ORDER — BUPIVACAINE HYDROCHLORIDE 2.5 MG/ML
5 INJECTION, SOLUTION EPIDURAL; INFILTRATION; INTRACAUDAL ONCE
Status: DISCONTINUED | OUTPATIENT
Start: 2022-09-26 | End: 2022-09-28 | Stop reason: HOSPADM

## 2022-09-26 NOTE — DISCHARGE INSTRUCTIONS
[facility]  Post Procedure Discharge Instructions        YOU HAVE HAD THE FOLLOWING PROCEDURE:                                  [x] Occipital Nerve Blocks  [] CTS wrist injection(s)  [] Knee Injection(s)         [] Shoulder Injection(s)   [] Elbow Injection(s)     [] Botox Injection  [] Cervical Trigger Point Injections    [] Thoracic Trigger Point Injections    [] Lumbar Trigger Point Injections  [] Piriformis Trigger Point Injections  [] SI Joint Injection(s)     [] Trochanteric Bursa Injection(s)       [] Ankle Injection(s)   [] Plantar Fasciitis   []  ______________  Injection(s) [] Botox []  Migraines [] Spasticity    YOU HAVE RECEIVED THE FOLLOWING MEDICATIONS IN YOUR INJECTION(s)  [] Lidocaine [x] Bupivacaine   [] DepoMedrol (steroid) [] Decadron (steroid)  []  Kenalog (steroid)   [] Toradol  [] Supartz [] Joyce Hosea    [] Botox        PATIENT INFORMATION:   You may experience the following symptoms after your procedure. These symptoms are normal and should not cause concern: You may have an increase in your pain. This may last 24 - 48 hours after your procedure. You may have no change in the pain that you had prior to your injection(s). You may have weakness or numbness in your affected extremity. If this occurs, this may last until numbing the medication wears off. REPORT THE FOLLOWING SYMPTOMS TO YOUR DOCTOR:  Redness, swelling or drainage at the injection site(s)  Unusual pain that interferes with your normal activities of daily living. OTHER INSTRUCTIONS:    [x] I will apply ice to the injection site(s) for at least 24 hours after the procedure. I will rotate the ice on for 20 minutes and off for 20 minutes for at least 24 hours. [x] I will not apply heat for at least 48 hours and I will not take a hot bath or shower for at least 24 hours.      [x] I understand that if Lidocaine or Bupivacaine was used in my injection(s) that the injection site(s) will be numb for a few hours after the procedure    [x] I understand if a steroid was used it will take effect in 3 - 7 days. I understand that as the numbing medication wears off, the pain may return until the steroids start working. [x] I understand that today I will rest for the next 24 hours and drink plenty of water. [] For Botox for Migraines please do not wear anything constricting around the forehead for 7-10 days post injection. Examples headband, hats, or bandana    [] For Botox for Spasticity start therapy for the affected limb in two weeks. [] Additional instructions: ______________________________________________ ___________________________________________________________________    Sedation:  Was oral sedation given? [] Yes  [x] No    I understand that if I took an oral nerve calming medication I will not drive for  [] 24 hours after taking the medication.     [x] I have received a copy of my discharge instructions and understand the above instructions to the best of my knowledge    Patient Discharged:  [x] Home  [] Hospital  [] Other  ____________________________________________    Via:  [x] Ambulatory  [] Wheelchair   [] Stretcher [] EMS       Accompanied By:   [] Significant other  [] Family Member  [] Friend   [] Hospital Staff  []  Ambulance Staff  [] Other_______________________________________________    Plan:  [x] Will return to the office in   [x] 1 month   [] 3 months for:  [] Procedure Follow-up  [] Office Visit   [x] Planned Procedure      Patient Signature: _____________________________________________________    Staff Signature: _______________________________________________________        If you have questions, problems or concerns you may call [phone] and follow the prompts    Dr. Ese Moser

## 2022-09-29 ENCOUNTER — TELEPHONE (OUTPATIENT)
Dept: NEUROLOGY | Age: 57
End: 2022-09-29

## 2022-09-29 NOTE — TELEPHONE ENCOUNTER
Called and spoke with patient to let her know that her appointment for 10-20-22 with Dr. Dwain Gonzalez had to be rescheduled due to provider will be out of the office. Patient is aware of when I have her appointment rescheduled too.

## 2022-10-05 DIAGNOSIS — R51.9 NONINTRACTABLE HEADACHE, UNSPECIFIED CHRONICITY PATTERN, UNSPECIFIED HEADACHE TYPE: ICD-10-CM

## 2022-10-05 RX ORDER — OXYCODONE AND ACETAMINOPHEN 10; 325 MG/1; MG/1
1 TABLET ORAL EVERY 6 HOURS PRN
Qty: 45 TABLET | Refills: 0 | Status: SHIPPED | OUTPATIENT
Start: 2022-10-09 | End: 2022-11-04 | Stop reason: SDUPTHER

## 2022-10-05 NOTE — TELEPHONE ENCOUNTER
Requested Prescriptions     Pending Prescriptions Disp Refills    oxyCODONE-acetaminophen (PERCOCET)  MG per tablet 45 tablet 0     Sig: Take 1 tablet by mouth every 6 hours as needed for Pain for up to 30 days.  Intended supply: 30 days       Last Office Visit:  7/20/2022  Next Office Visit:  11/10/2022  Last Medication Refill:  9/9/22  Jethro Risk up to date:  10/5/22    *RX updated to reflect   10/9/22  fill date*

## 2022-10-07 DIAGNOSIS — R51.9 NONINTRACTABLE HEADACHE, UNSPECIFIED CHRONICITY PATTERN, UNSPECIFIED HEADACHE TYPE: ICD-10-CM

## 2022-10-07 RX ORDER — OXYCODONE AND ACETAMINOPHEN 10; 325 MG/1; MG/1
1 TABLET ORAL EVERY 6 HOURS PRN
Qty: 45 TABLET | Refills: 0 | OUTPATIENT
Start: 2022-10-09 | End: 2022-11-08

## 2022-10-10 DIAGNOSIS — R51.9 NONINTRACTABLE HEADACHE, UNSPECIFIED CHRONICITY PATTERN, UNSPECIFIED HEADACHE TYPE: ICD-10-CM

## 2022-10-10 RX ORDER — OXYCODONE AND ACETAMINOPHEN 10; 325 MG/1; MG/1
1 TABLET ORAL EVERY 6 HOURS PRN
Qty: 45 TABLET | Refills: 0 | OUTPATIENT
Start: 2022-10-10 | End: 2022-11-09

## 2022-10-10 RX ORDER — OXYCODONE AND ACETAMINOPHEN 10; 325 MG/1; MG/1
TABLET ORAL
Qty: 45 TABLET | Refills: 0 | OUTPATIENT
Start: 2022-10-10

## 2022-10-10 NOTE — TELEPHONE ENCOUNTER
Jacky Nuno called to reschedule a  appt. On 11/10. She is needing to reschedule due to another appt. This day  . Please be advised that the best time to call her to accommodate their needs is Anytime. Thank you.

## 2022-10-11 ENCOUNTER — TELEPHONE (OUTPATIENT)
Dept: NEUROLOGY | Age: 57
End: 2022-10-11

## 2022-10-11 NOTE — TELEPHONE ENCOUNTER
Patient called in needing to reschedule follow up 11/10/22  Looking to get something on 11.17.22 or 11.24.22  A good call back time is anytime  Thank you

## 2022-10-12 NOTE — TELEPHONE ENCOUNTER
Marylee Banister called in to reschedule appt advised there is nothing on the 17th or 24th and patient isn't able to make it on the 10th needed to r/s for first available please

## 2022-10-12 NOTE — TELEPHONE ENCOUNTER
Tried calling patient regarding her needing to reschedule her appointment. Patient is requesting Nov. 17th or the 24th. Dr. Deloris Ramirez does not have any openings on the 17th. The 24th the office is closed. Unable to leave patient a VM due to no VM.

## 2022-11-03 ENCOUNTER — TELEPHONE (OUTPATIENT)
Dept: NEUROLOGY | Age: 57
End: 2022-11-03

## 2022-11-03 DIAGNOSIS — R51.9 NONINTRACTABLE HEADACHE, UNSPECIFIED CHRONICITY PATTERN, UNSPECIFIED HEADACHE TYPE: ICD-10-CM

## 2022-11-03 NOTE — TELEPHONE ENCOUNTER
Requested Prescriptions     Pending Prescriptions Disp Refills    oxyCODONE-acetaminophen (PERCOCET)  MG per tablet 45 tablet 0     Sig: Take 1 tablet by mouth every 6 hours as needed for Pain for up to 30 days.  Intended supply: 30 days       Last Office Visit:  7/20/2022  Next Office Visit:  12/1/2022  Last Medication Refill:  10/9/22  Magdalena Xiao up to date:  10/5/22    *RX updated to reflect   11/8/22  fill date*    Yvon pt

## 2022-11-03 NOTE — TELEPHONE ENCOUNTER
PT called and said her Percocet was not refilled, her other meds were refilled. Please call if any questions. Thank You!

## 2022-11-04 RX ORDER — OXYCODONE AND ACETAMINOPHEN 10; 325 MG/1; MG/1
1 TABLET ORAL EVERY 6 HOURS PRN
Qty: 45 TABLET | Refills: 0 | Status: SHIPPED | OUTPATIENT
Start: 2022-11-08 | End: 2022-12-01 | Stop reason: SDUPTHER

## 2022-12-01 ENCOUNTER — OFFICE VISIT (OUTPATIENT)
Dept: NEUROLOGY | Age: 57
End: 2022-12-01
Payer: MEDICARE

## 2022-12-01 VITALS
DIASTOLIC BLOOD PRESSURE: 85 MMHG | SYSTOLIC BLOOD PRESSURE: 161 MMHG | WEIGHT: 115 LBS | HEIGHT: 63 IN | HEART RATE: 59 BPM | BODY MASS INDEX: 20.38 KG/M2

## 2022-12-01 DIAGNOSIS — R51.9 NONINTRACTABLE HEADACHE, UNSPECIFIED CHRONICITY PATTERN, UNSPECIFIED HEADACHE TYPE: Primary | ICD-10-CM

## 2022-12-01 DIAGNOSIS — M54.2 PAIN, NECK: ICD-10-CM

## 2022-12-01 DIAGNOSIS — G43.909 MIGRAINE WITHOUT STATUS MIGRAINOSUS, NOT INTRACTABLE, UNSPECIFIED MIGRAINE TYPE: ICD-10-CM

## 2022-12-01 DIAGNOSIS — G89.29 OTHER CHRONIC PAIN: ICD-10-CM

## 2022-12-01 PROCEDURE — 99213 OFFICE O/P EST LOW 20 MIN: CPT | Performed by: PSYCHIATRY & NEUROLOGY

## 2022-12-01 RX ORDER — OXYCODONE AND ACETAMINOPHEN 10; 325 MG/1; MG/1
1 TABLET ORAL EVERY 6 HOURS PRN
Qty: 45 TABLET | Refills: 0 | Status: SHIPPED | OUTPATIENT
Start: 2022-12-08 | End: 2023-01-07
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
## 2022-12-07 DIAGNOSIS — R51.9 NONINTRACTABLE HEADACHE, UNSPECIFIED CHRONICITY PATTERN, UNSPECIFIED HEADACHE TYPE: ICD-10-CM

## 2022-12-07 RX ORDER — OXYCODONE AND ACETAMINOPHEN 10; 325 MG/1; MG/1
TABLET ORAL
Qty: 45 TABLET | Refills: 0 | OUTPATIENT
Start: 2022-12-07

## 2023-01-09 ENCOUNTER — HOSPITAL ENCOUNTER (OUTPATIENT)
Dept: GENERAL RADIOLOGY | Facility: HOSPITAL | Age: 58
Discharge: HOME | End: 2023-01-09
Payer: MEDICARE

## 2023-01-09 ENCOUNTER — APPOINTMENT (OUTPATIENT)
Dept: OTHER | Facility: HOSPITAL | Age: 58
End: 2023-01-09
Payer: MEDICARE

## 2023-01-09 ENCOUNTER — OFFICE VISIT (OUTPATIENT)
Dept: NEUROSURGERY | Facility: CLINIC | Age: 58
End: 2023-01-09
Payer: MEDICARE

## 2023-01-09 VITALS — HEIGHT: 63 IN | BODY MASS INDEX: 19.67 KG/M2 | WEIGHT: 111 LBS

## 2023-01-09 DIAGNOSIS — M79.601 PAIN OF RIGHT UPPER EXTREMITY: ICD-10-CM

## 2023-01-09 DIAGNOSIS — R20.2 NUMBNESS AND TINGLING OF RIGHT UPPER EXTREMITY: ICD-10-CM

## 2023-01-09 DIAGNOSIS — R20.0 NUMBNESS AND TINGLING OF RIGHT UPPER EXTREMITY: ICD-10-CM

## 2023-01-09 DIAGNOSIS — R51.9 NONINTRACTABLE HEADACHE, UNSPECIFIED CHRONICITY PATTERN, UNSPECIFIED HEADACHE TYPE: ICD-10-CM

## 2023-01-09 DIAGNOSIS — M54.2 CERVICALGIA: Primary | ICD-10-CM

## 2023-01-09 DIAGNOSIS — Z72.0 TOBACCO ABUSE: ICD-10-CM

## 2023-01-09 DIAGNOSIS — M54.2 CERVICALGIA: ICD-10-CM

## 2023-01-09 PROCEDURE — 99214 OFFICE O/P EST MOD 30 MIN: CPT | Performed by: NURSE PRACTITIONER

## 2023-01-09 PROCEDURE — 72052 X-RAY EXAM NECK SPINE 6/>VWS: CPT

## 2023-01-09 RX ORDER — CLONAZEPAM 0.5 MG/1
0.5 TABLET ORAL
COMMUNITY
Start: 2022-12-22

## 2023-01-09 RX ORDER — ZOLPIDEM TARTRATE 12.5 MG/1
12.5 TABLET, FILM COATED, EXTENDED RELEASE ORAL NIGHTLY PRN
COMMUNITY
Start: 2022-12-22

## 2023-01-09 RX ORDER — BUTALBITAL, ACETAMINOPHEN, CAFFEINE AND CODEINE PHOSPHATE 50; 325; 40; 30 MG/1; MG/1; MG/1; MG/1
CAPSULE ORAL
COMMUNITY
Start: 2022-12-20

## 2023-01-09 RX ORDER — LORAZEPAM 1 MG/1
TABLET ORAL
COMMUNITY
Start: 2022-12-22

## 2023-01-09 RX ORDER — GABAPENTIN 800 MG/1
800 TABLET ORAL 3 TIMES DAILY
COMMUNITY
Start: 2022-12-05

## 2023-01-09 RX ORDER — CARIPRAZINE 3 MG/1
3 CAPSULE, GELATIN COATED ORAL DAILY
COMMUNITY
Start: 2022-12-21

## 2023-01-09 RX ORDER — SERTRALINE HYDROCHLORIDE 25 MG/1
25 TABLET, FILM COATED ORAL DAILY
COMMUNITY

## 2023-01-09 RX ORDER — LEVOTHYROXINE SODIUM 88 UG/1
TABLET ORAL
COMMUNITY
Start: 2022-11-21

## 2023-01-09 RX ORDER — OXYCODONE AND ACETAMINOPHEN 10; 325 MG/1; MG/1
1 TABLET ORAL EVERY 6 HOURS PRN
COMMUNITY
Start: 2022-12-08
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
## 2023-01-09 NOTE — PATIENT INSTRUCTIONS
High-Protein and High-Calorie Diet  Eating high-protein and high-calorie foods can help you to gain weight, heal after an injury, and recover after an illness or surgery. The specific amount of daily protein and calories you need depends on:  Your body weight.  The reason this diet is recommended for you.  Generally, a high-protein, high-calorie diet involves:  Eating 250-500 extra calories each day.  Making sure that you get enough of your daily calories from protein. Ask your health care provider how many of your calories should come from protein.  Talk with a health care provider or a dietitian about how much protein and how many calories you need each day. Follow the diet as directed by your health care provider.  What are tips for following this plan?  Reading food labels  Check the nutrition facts label for calories, grams of fat and protein. Items with more than 4 grams of protein are high-protein foods.  Preparing meals  Add whole milk, half-and-half, or heavy cream to cereal, pudding, soup, or hot cocoa.  Add whole milk to instant breakfast drinks.  Add peanut butter to oatmeal or smoothies.  Add powdered milk to baked goods, smoothies, or milkshakes.  Add powdered milk, cream, or butter to mashed potatoes.  Add cheese to cooked vegetables.  Make whole-milk yogurt parfaits. Top them with granola, fruit, or nuts.  Add cottage cheese to fruit.  Add avocado, cheese, or both to sandwiches or salads. Add avocado to smoothies.  Add meat, poultry, or seafood to rice, pasta, casseroles, salads, and soups.  Use mayonnaise when making egg salad, chicken salad, or tuna salad.  Use peanut butter as a dip for fruits and vegetables or as a topping for pretzels, celery, or crackers.  Add beans to casseroles, dips, and spreads.  Add pureed beans to sauces and soups.  Replace calorie-free drinks with calorie-containing drinks, such as milk and fruit juice.  Replace water with milk or heavy cream when making foods such as  oatmeal, pudding, or cocoa.  Add oil or butter to cooked vegetables and grains.  Add cream cheese to sandwiches or as a topping on crackers and bread.  Make cream-based pastas and soups.  General information  Ask your health care provider if you should take a nutritional supplement.  Try to eat six small meals each day instead of three large meals. A general goal is to eat every 2 to 3 hours.  Eat a balanced diet. In each meal, include one food that is high in protein and one food with fat in it.  Keep nutritious snacks available, such as nuts, trail mixes, dried fruit, and yogurt.  If you have kidney disease or diabetes, talk with your health care provider about how much protein is safe for you. Too much protein may put extra stress on your kidneys.  Drink your calories. Choose high-calorie drinks and have them after your meals.  Consider setting a timer to remind you to eat. You will want to eat even if you do not feel very hungry.  What high-protein foods should I eat?  Vegetables  Soybeans. Peas.  Grains  Quinoa. Bulgur wheat. Buckwheat.  Meats and other proteins  Beef, pork, and poultry. Fish and seafood. Eggs. Tofu. Textured vegetable protein (TVP). Peanut butter. Nuts and seeds. Dried beans. Protein powders. Hummus.  Dairy  Whole milk. Whole-milk yogurt. Powdered milk. Cheese. Cottage Cheese. Eggnog.  Beverages  High-protein supplement drinks. Soy milk.  Other foods  Protein bars.  The items listed above may not be a complete list of foods and beverages you can eat and drink. Contact a dietitian for more information.  What high-calorie foods should I eat?  Fruits  Dried fruit. Fruit leather. Canned fruit in syrup. Fruit juice. Avocado.  Vegetables  Vegetables cooked in oil or butter. Fried potatoes.  Grains  Pasta. Quick breads. Muffins. Pancakes. Ready-to-eat cereal.  Meats and other proteins  Peanut butter. Nuts and seeds.  Dairy  Heavy cream. Whipped cream. Cream cheese. Sour cream. Ice cream. Custard.  Pudding. Whole milk dairy products.  Beverages  Meal-replacement beverages. Nutrition shakes. Fruit juice.  Seasonings and condiments  Salad dressing. Mayonnaise. Darwin sauce. Fruit preserves or jelly. Honey. Syrup.  Sweets and desserts  Cake. Cookies. Pie. Pastries. Candy bars. Chocolate.  Fats and oils  Butter or margarine. Oil. Gravy.  Other foods  Meal-replacement bars.  The items listed above may not be a complete list of foods and beverages you can eat and drink. Contact a dietitian for more information.  Summary  A high-protein, high-calorie diet can help you gain weight or heal faster after an injury, illness, or surgery.  To increase your protein and calories, add ingredients such as whole milk, peanut butter, cheese, beans, meat, or seafood to meal items.  To get enough extra calories each day, include high-calorie foods and beverages at each meal.  Adding a high-calorie drink or shake can be an easy way to help you get enough calories each day. Talk with your healthcare provider or dietitian about the best options for you.  This information is not intended to replace advice given to you by your health care provider. Make sure you discuss any questions you have with your health care provider.  Document Revised: 11/21/2021 Document Reviewed: 11/21/2021  [employer] Patient Education © 2022 [employer]  Tobacco Use Disorder  Tobacco use disorder (TUD) occurs when a person craves, seeks, and uses tobacco, regardless of the consequences. This disorder can cause problems with mental and physical health. It can affect your ability to have healthy relationships, and it can keep you from meeting your responsibilities at work, home, or school.  Tobacco may be:  Smoked as a cigarette or cigar.  Inhaled using e-cigarettes.  Smoked in a pipe or hookah.  Chewed as smokeless tobacco.  Inhaled into the nostrils as snuff.  Tobacco products contain a dangerous chemical called nicotine, which is very addictive. Nicotine  triggers hormones that make the body feel stimulated and works on areas of the brain that make you feel good. These effects can make it hard for people to quit nicotine.  Tobacco contains many other unsafe chemicals that can damage almost every organ in the body. Smoking tobacco also puts others in danger due to fire risk and possible health problems caused by breathing in secondhand smoke.  What are the signs or symptoms?  Symptoms of TUD may include:  Being unable to slow down or stop your tobacco use.  Spending an abnormal amount of time getting or using tobacco.  Craving tobacco. Cravings may last for up to 6 months after quitting.  Tobacco use that:  Interferes with your work, school, or home life.  Interferes with your personal and social relationships.  Makes you give up activities that you once enjoyed or found important.  Using tobacco even though you know that it is:  Dangerous or bad for your health or someone else's health.  Causing problems in your life.  Needing more and more of the substance to get the same effect (developing tolerance).  Experiencing unpleasant symptoms if you do not use the substance (withdrawal). Withdrawal symptoms may include:  Depressed, anxious, or irritable mood.  Difficulty concentrating.  Increased appetite.  Restlessness or trouble sleeping.  Using the substance to avoid withdrawal.  How is this diagnosed?  This condition may be diagnosed based on:  Your current and past tobacco use. Your health care provider may ask questions about how your tobacco use affects your life.  A physical exam.  You may be diagnosed with TUD if you have at least two symptoms within a 12-month period.  How is this treated?  This condition is treated by stopping tobacco use. Many people are unable to quit on their own and need help. Treatment may include:  Nicotine replacement therapy (NRT). NRT provides nicotine without the other harmful chemicals in tobacco. NRT gradually lowers the dosage of  nicotine in the body and reduces withdrawal symptoms. NRT is available as:  Over-the-counter gums, lozenges, and skin patches.  Prescription mouth inhalers and nasal sprays.  Medicine that acts on the brain to reduce cravings and withdrawal symptoms.  A type of talk therapy that examines your triggers for tobacco use, how to avoid them, and how to cope with cravings (behavioral therapy).  Hypnosis. This may help with withdrawal symptoms.  Joining a support group for others coping with TUD.  The best treatment for TUD is usually a combination of medicine, talk therapy, and support groups. Recovery can be a long process. Many people start using tobacco again after stopping (relapse). If you relapse, it does not mean that treatment will not work.  Follow these instructions at home:  Lifestyle  Do not use any products that contain nicotine or tobacco, such as cigarettes and e-cigarettes.  Avoid things that trigger tobacco use as much as you can. Triggers include people and situations that usually cause you to use tobacco.  Avoid drinks that contain caffeine, including coffee. These may worsen some withdrawal symptoms.  Find ways to manage stress. Wanting to smoke may cause stress, and stress can make you want to smoke. Relaxation techniques such as deep breathing, meditation, and yoga may help.  Attend support groups as needed. These groups are an important part of long-term recovery for many people.  General instructions  Take over-the-counter and prescription medicines only as told by your health care provider.  Check with your health care provider before taking any new prescription or over-the-counter medicines.  Decide on a friend, family member, or smoking quit-line (such as 1-800-QUIT-NOW in the [place] that you can call or text when you feel the urge to smoke or when you need help coping with cravings.  Keep all follow-up visits as told by your health care provider and therapist. This is important.  Contact a  health care provider if:  You are not able to take your medicines as prescribed.  Your symptoms get worse, even with treatment.  Summary  Tobacco use disorder (TUD) occurs when a person craves, seeks, and uses tobacco regardless of the consequences.  This condition may be diagnosed based on your current and past tobacco use and a physical exam.  Many people are unable to quit on their own and need help. Recovery can be a long process.  The most effective treatment for TUD is usually a combination of medicine, talk therapy, and support groups.  This information is not intended to replace advice given to you by your health care provider. Make sure you discuss any questions you have with your health care provider.  Document Revised: 08/26/2022 Document Reviewed: 11/09/2021  [employer] Patient Education © 2022 [employer]

## 2023-01-09 NOTE — TELEPHONE ENCOUNTER
Requested Prescriptions     Pending Prescriptions Disp Refills    oxyCODONE-acetaminophen (PERCOCET)  MG per tablet 45 tablet 0     Sig: Take 1 tablet by mouth every 6 hours as needed for Pain for up to 30 days. Must last 30 days.        Last Office Visit:  12/1/2022  Next Office Visit:  3/1/2023  Last Medication Refill:  12/8/22  Christophe Clements up to date:  1/9/23    *RX updated to reflect   1/9/23  fill date*

## 2023-01-10 RX ORDER — OXYCODONE AND ACETAMINOPHEN 10; 325 MG/1; MG/1
TABLET ORAL
Qty: 45 TABLET | Refills: 0 | OUTPATIENT
Start: 2023-01-10

## 2023-01-10 RX ORDER — OXYCODONE AND ACETAMINOPHEN 10; 325 MG/1; MG/1
1 TABLET ORAL EVERY 6 HOURS PRN
Qty: 45 TABLET | Refills: 0 | Status: SHIPPED | OUTPATIENT
Start: 2023-01-10 | End: 2023-02-09

## 2023-01-23 ENCOUNTER — HOSPITAL ENCOUNTER (OUTPATIENT)
Dept: PAIN MANAGEMENT | Age: 58
Discharge: HOME | End: 2023-01-23
Payer: MEDICARE

## 2023-01-23 VITALS
HEART RATE: 61 BPM | RESPIRATION RATE: 18 BRPM | SYSTOLIC BLOOD PRESSURE: 119 MMHG | TEMPERATURE: 96.3 F | OXYGEN SATURATION: 94 % | DIASTOLIC BLOOD PRESSURE: 82 MMHG

## 2023-01-23 PROCEDURE — 64405 NJX AA&/STRD GR OCPL NRV: CPT | Performed by: PSYCHIATRY & NEUROLOGY

## 2023-01-23 PROCEDURE — 2500000003 HC RX 250 WO HCPCS

## 2023-01-23 PROCEDURE — 64405 NJX AA&/STRD GR OCPL NRV: CPT

## 2023-01-23 RX ORDER — BUPIVACAINE HYDROCHLORIDE 2.5 MG/ML
5 INJECTION, SOLUTION EPIDURAL; INFILTRATION; INTRACAUDAL ONCE
Status: DISCONTINUED | OUTPATIENT
Start: 2023-01-23 | End: 2023-01-25 | Stop reason: HOSPADM

## 2023-01-23 NOTE — PROGRESS NOTES
Patient states that he/she has _0_____ headaches out of 30 days each month.   Patient states that he/she has _6_____ migraines out of 30 days each month.monthly

## 2023-01-23 NOTE — DISCHARGE INSTRUCTIONS
[facility] Physical And Pain Medicine  Post Procedure Discharge Instructions        YOU HAVE HAD THE FOLLOWING PROCEDURE:                                  [x] Occipital Nerve Blocks  [] CTS wrist injection(s)  [] Knee Injection(s)         [] Shoulder Injection(s)   [] Elbow Injection(s)     [] Botox Injection  [] Cervical Trigger Point Injections    [] Thoracic Trigger Point Injections    [] Lumbar Trigger Point Injections  [] Piriformis Trigger Point Injections  [] SI Joint Injection(s)     [] Trochanteric Bursa Injection(s)       [] Ankle Injection(s)   [] Plantar Fasciitis   []  ______________  Injection(s) [] Botox []  Migraines [] Spasticity    YOU HAVE RECEIVED THE FOLLOWING MEDICATIONS IN YOUR INJECTION(s)  [] Lidocaine [x] Bupivacaine   [] DepoMedrol (steroid) [] Decadron (steroid)  []  Kenalog (steroid)   [] Toradol  [] Supartz [] Ruthine Lacy    [] Botox        PATIENT INFORMATION:   You may experience the following symptoms after your procedure. These symptoms are normal and should not cause concern: You may have an increase in your pain. This may last 24 - 48 hours after your procedure. You may have no change in the pain that you had prior to your injection(s). You may have weakness or numbness in your affected extremity. If this occurs, this may last until numbing the medication wears off. REPORT THE FOLLOWING SYMPTOMS TO YOUR DOCTOR:  Redness, swelling or drainage at the injection site(s)  Unusual pain that interferes with your normal activities of daily living. OTHER INSTRUCTIONS:    [x] I will apply ice to the injection site(s) for at least 24 hours after the procedure. I will rotate the ice on for 20 minutes and off for 20 minutes for at least 24 hours. [x] I will not apply heat for at least 48 hours and I will not take a hot bath or shower for at least 24 hours.      [x] I understand that if Lidocaine or Bupivacaine was used in my injection(s) that the injection site(s) will be numb for a few hours after the procedure    [x] I understand if a steroid was used it will take effect in 3 - 7 days. I understand that as the numbing medication wears off, the pain may return until the steroids start working. [x] I understand that today I will rest for the next 24 hours and drink plenty of water. [] For Botox for Migraines please do not wear anything constricting around the forehead for 7-10 days post injection. Examples headband, hats, or bandana    [] For Botox for Spasticity start therapy for the affected limb in two weeks. [] Additional instructions: ______________________________________________ ___________________________________________________________________    Sedation:  Was oral sedation given? [] Yes  [x] No    I understand that if I took an oral nerve calming medication I will not drive for  [] 24 hours after taking the medication.     [x] I have received a copy of my discharge instructions and understand the above instructions to the best of my knowledge    Patient Discharged:  [x] Home  [] Hospital  [] Other  ____________________________________________    Via:  [x] Ambulatory  [] Wheelchair   [] Stretcher [] EMS       Accompanied By:   [] Significant other  [] Family Member  [] Friend   [] Hospital Staff  []  Ambulance Staff  [] Other_______________________________________________    Plan:  [x] Will return to the office in   [] 1 month   [] 3 months for:  [] Procedure Follow-up  [] Office Visit   [x] Planned Procedure      Patient Signature: _____________________________________________________    Staff Signature: _______________________________________________________        If you have questions, problems or concerns you may call [phone] and follow the prompts    Dr. Johnston Comp

## 2023-02-07 DIAGNOSIS — R51.9 NONINTRACTABLE HEADACHE, UNSPECIFIED CHRONICITY PATTERN, UNSPECIFIED HEADACHE TYPE: ICD-10-CM

## 2023-02-07 RX ORDER — OXYCODONE AND ACETAMINOPHEN 10; 325 MG/1; MG/1
1 TABLET ORAL EVERY 6 HOURS PRN
Qty: 45 TABLET | Refills: 0 | Status: SHIPPED | OUTPATIENT
Start: 2023-02-09 | End: 2023-03-11

## 2023-02-07 NOTE — TELEPHONE ENCOUNTER
Requested Prescriptions     Pending Prescriptions Disp Refills    oxyCODONE-acetaminophen (PERCOCET)  MG per tablet 45 tablet 0     Sig: Take 1 tablet by mouth every 6 hours as needed for Pain for up to 30 days. Must last 30 days.       Last Office Visit:  12/1/2022  Next Office Visit:  3/1/2023  Last Medication Refill:  1/10/2023   Melvni up to date:  1/10/2023    *RX updated to reflect   2/9/2023  fill date*

## 2023-02-22 ENCOUNTER — TELEPHONE (OUTPATIENT)
Dept: NEUROLOGY | Age: 58
End: 2023-02-22

## 2023-02-23 NOTE — TELEPHONE ENCOUNTER
Patient appointment in Pain Management has been rescheduled. Patient will need to call Pain Management if she is needing to reschedule her procedure.

## 2023-03-08 DIAGNOSIS — R51.9 NONINTRACTABLE HEADACHE, UNSPECIFIED CHRONICITY PATTERN, UNSPECIFIED HEADACHE TYPE: ICD-10-CM

## 2023-03-08 RX ORDER — OXYCODONE AND ACETAMINOPHEN 10; 325 MG/1; MG/1
1 TABLET ORAL EVERY 6 HOURS PRN
Qty: 45 TABLET | Refills: 0 | Status: SHIPPED | OUTPATIENT
Start: 2023-03-11 | End: 2023-04-10

## 2023-03-08 NOTE — TELEPHONE ENCOUNTER
Requested Prescriptions     Pending Prescriptions Disp Refills    oxyCODONE-acetaminophen (PERCOCET)  MG per tablet 45 tablet 0     Sig: Take 1 tablet by mouth every 6 hours as needed for Pain for up to 30 days. Must last 30 days.        Last Office Visit:  12/1/2022  Next Office Visit:  4/6/2023  Last Medication Refill:  2/9/23  Bernadette Leavitt up to date:  1/10/23    *RX updated to reflect   3/11/23  fill date*    Yvon stewart

## 2023-03-09 DIAGNOSIS — R51.9 NONINTRACTABLE HEADACHE, UNSPECIFIED CHRONICITY PATTERN, UNSPECIFIED HEADACHE TYPE: ICD-10-CM

## 2023-03-10 RX ORDER — OXYCODONE AND ACETAMINOPHEN 10; 325 MG/1; MG/1
TABLET ORAL
Qty: 45 TABLET | Refills: 0 | OUTPATIENT
Start: 2023-03-10

## 2023-03-15 DIAGNOSIS — G89.29 OTHER CHRONIC PAIN: ICD-10-CM

## 2023-03-15 DIAGNOSIS — R51.9 NONINTRACTABLE HEADACHE, UNSPECIFIED CHRONICITY PATTERN, UNSPECIFIED HEADACHE TYPE: ICD-10-CM

## 2023-03-15 RX ORDER — CARISOPRODOL 350 MG/1
TABLET ORAL
Qty: 90 TABLET | Refills: 0 | Status: SHIPPED | OUTPATIENT
Start: 2023-03-22 | End: 2023-04-21

## 2023-03-15 RX ORDER — BUTALBITAL, ACETAMINOPHEN, CAFFEINE AND CODEINE PHOSPHATE 50; 325; 40; 30 MG/1; MG/1; MG/1; MG/1
CAPSULE ORAL
Qty: 120 CAPSULE | Refills: 0 | Status: SHIPPED | OUTPATIENT
Start: 2023-03-15 | End: 2023-04-15

## 2023-03-15 NOTE — TELEPHONE ENCOUNTER
Requested Prescriptions     Pending Prescriptions Disp Refills    butalbital-acetaminophen-caffeine-codeine (FIORICET WITH CODEINE) -64-30 MG per capsule [Pharmacy Med Name: BUTALBITAL/ACETAMINOPHEN/CAFFEINE/CODEINE [id number] CAPSULE] 120 capsule 5     Sig: TAKE (1) CAPSULE EVERY SIX HOURS AS NEEDED.     carisoprodol (SOMA) 350 MG tablet [Pharmacy Med Name: CARISOPRODOL 350MG TABLET] 90 tablet 0     Sig: TAKE (1) TABLET BY MOUTH THREE TIMES DAILY AS NEEDED       Last Office Visit:  2022  Next Office Visit:  2023  Last Medication Refill:  BOTH 22  Navid Damon up to date:  3/15/23    *RX updated to reflect  fill date*  Soma 23  Fioricet 3/15/23 (script  at pharmacy)      Caleb Andrews pt

## 2023-03-18 DIAGNOSIS — G89.29 OTHER CHRONIC PAIN: ICD-10-CM

## 2023-03-18 DIAGNOSIS — R51.9 NONINTRACTABLE HEADACHE, UNSPECIFIED CHRONICITY PATTERN, UNSPECIFIED HEADACHE TYPE: ICD-10-CM

## 2023-03-20 RX ORDER — BUTALBITAL, ACETAMINOPHEN, CAFFEINE AND CODEINE PHOSPHATE 50; 325; 40; 30 MG/1; MG/1; MG/1; MG/1
CAPSULE ORAL
Qty: 120 CAPSULE | Refills: 0 | OUTPATIENT
Start: 2023-03-20

## 2023-03-20 RX ORDER — CARISOPRODOL 350 MG/1
TABLET ORAL
Qty: 90 TABLET | Refills: 5 | OUTPATIENT
Start: 2023-03-20

## 2023-04-03 ENCOUNTER — HOSPITAL ENCOUNTER (OUTPATIENT)
Dept: PAIN MANAGEMENT | Age: 58
Discharge: HOME | End: 2023-04-03
Payer: MEDICARE

## 2023-04-03 VITALS
TEMPERATURE: 98.8 F | DIASTOLIC BLOOD PRESSURE: 99 MMHG | SYSTOLIC BLOOD PRESSURE: 149 MMHG | OXYGEN SATURATION: 95 % | HEART RATE: 105 BPM | RESPIRATION RATE: 18 BRPM

## 2023-04-03 DIAGNOSIS — R51.9 NONINTRACTABLE HEADACHE, UNSPECIFIED CHRONICITY PATTERN, UNSPECIFIED HEADACHE TYPE: ICD-10-CM

## 2023-04-03 DIAGNOSIS — G89.29 OTHER CHRONIC PAIN: ICD-10-CM

## 2023-04-03 PROCEDURE — 64405 NJX AA&/STRD GR OCPL NRV: CPT | Performed by: PSYCHIATRY & NEUROLOGY

## 2023-04-03 PROCEDURE — 2500000003 HC RX 250 WO HCPCS

## 2023-04-03 PROCEDURE — 64405 NJX AA&/STRD GR OCPL NRV: CPT

## 2023-04-03 RX ORDER — OXYCODONE AND ACETAMINOPHEN 10; 325 MG/1; MG/1
1 TABLET ORAL EVERY 6 HOURS PRN
Qty: 45 TABLET | Refills: 0 | Status: SHIPPED | OUTPATIENT
Start: 2023-04-10 | End: 2023-05-10

## 2023-04-03 RX ORDER — CARISOPRODOL 350 MG/1
TABLET ORAL
Qty: 90 TABLET | Refills: 5 | Status: SHIPPED | OUTPATIENT
Start: 2023-04-21 | End: 2023-05-20

## 2023-04-03 RX ORDER — BUTALBITAL, ACETAMINOPHEN, CAFFEINE AND CODEINE PHOSPHATE 50; 325; 40; 30 MG/1; MG/1; MG/1; MG/1
CAPSULE ORAL
Qty: 120 CAPSULE | Refills: 5 | Status: SHIPPED | OUTPATIENT
Start: 2023-04-15 | End: 2023-05-04

## 2023-04-03 RX ORDER — BUPIVACAINE HYDROCHLORIDE 2.5 MG/ML
5 INJECTION, SOLUTION EPIDURAL; INFILTRATION; INTRACAUDAL ONCE
Status: DISCONTINUED | OUTPATIENT
Start: 2023-04-03 | End: 2023-04-05 | Stop reason: HOSPADM

## 2023-04-03 NOTE — DISCHARGE INSTRUCTIONS
will be numb for a few hours after the procedure    [x] I understand if a steroid was used it will take effect in 3 - 7 days. I understand that as the numbing medication wears off, the pain may return until the steroids start working. [x] I understand that today I will rest for the next 24 hours and drink plenty of water. [] For Botox for Migraines please do not wear anything constricting around the forehead for 7-10 days post injection. Examples headband, hats, or bandana    [] For Botox for Spasticity start therapy for the affected limb in two weeks. [] Additional instructions: ______________________________________________ ___________________________________________________________________    Sedation:  Was oral sedation given? [] Yes  [x] No    I understand that if I took an oral nerve calming medication I will not drive for  [] 24 hours after taking the medication.     [x] I have received a copy of my discharge instructions and understand the above instructions to the best of my knowledge    Patient Discharged:   Home  [] Hospital  [] Other  ____________________________________________  [x]  Via:  [] Ambulatory  [] Wheelchair   [] Stretcher [] EMS       Accompanied By:   [] Significant other  [] Family Member  [] Friend   [] Hospital Staff  []  Ambulance Staff  [] Other_______________________________________________    Plan:  [x] Will return to the office in   [x] 1 month   [] 3 months for:  [] Procedure Follow-up  [] Office Visit   [x] Planned Procedure      Patient Signature: _____________________________________________________    Staff Signature: _______________________________________________________        If you have questions, problems or concerns you may call [phone] and follow the prompts    Dr. Julia Donald

## 2023-04-06 ENCOUNTER — OFFICE VISIT (OUTPATIENT)
Dept: NEUROLOGY | Age: 58
End: 2023-04-06
Payer: MEDICARE

## 2023-04-06 VITALS
SYSTOLIC BLOOD PRESSURE: 138 MMHG | HEIGHT: 63 IN | RESPIRATION RATE: 18 BRPM | WEIGHT: 111 LBS | DIASTOLIC BLOOD PRESSURE: 83 MMHG | BODY MASS INDEX: 19.67 KG/M2 | HEART RATE: 60 BPM

## 2023-04-06 DIAGNOSIS — R51.9 NONINTRACTABLE HEADACHE, UNSPECIFIED CHRONICITY PATTERN, UNSPECIFIED HEADACHE TYPE: Primary | ICD-10-CM

## 2023-04-06 DIAGNOSIS — M54.2 PAIN, NECK: ICD-10-CM

## 2023-04-06 DIAGNOSIS — G43.909 MIGRAINE WITHOUT STATUS MIGRAINOSUS, NOT INTRACTABLE, UNSPECIFIED MIGRAINE TYPE: ICD-10-CM

## 2023-04-06 DIAGNOSIS — G89.29 OTHER CHRONIC PAIN: ICD-10-CM

## 2023-04-06 PROCEDURE — 99213 OFFICE O/P EST LOW 20 MIN: CPT | Performed by: PSYCHIATRY & NEUROLOGY

## 2023-04-06 NOTE — PROGRESS NOTES
Review of Systems    Constitutional - No fever or chills. No diaphoresis or significant fatigue. HENT -  No tinnitus or significant hearing loss. Eyes - no sudden vision change or eye pain  Respiratory - no significant shortness of breath or cough  Cardiovascular - no chest pain No palpitations or significant leg swelling  Gastrointestinal - no abdominal swelling or pain. Genitourinary - No difficulty urinating, dysuria  Musculoskeletal - no back pain or myalgia. Skin - no color change or rash  Neurologic - No seizures. No lateralizing weakness. Hematologic - no easy bruising or excessive bleeding. Psychiatric - no severe anxiety or nervousness. All other review of systems are negative.
treatment plan. She is to follow up with me in approximately 3 month and call with any further problems. . Continue current care as things stable overall. Continue present care    Plan    No orders of the defined types were placed in this encounter. No orders of the defined types were placed in this encounter. Return in about 3 months (around 7/6/2023).

## 2023-05-01 ENCOUNTER — HOSPITAL ENCOUNTER (OUTPATIENT)
Dept: PAIN MANAGEMENT | Age: 58
Discharge: HOME | End: 2023-05-01
Payer: MEDICARE

## 2023-05-01 VITALS
RESPIRATION RATE: 18 BRPM | WEIGHT: 113 LBS | OXYGEN SATURATION: 92 % | TEMPERATURE: 96.9 F | SYSTOLIC BLOOD PRESSURE: 152 MMHG | HEIGHT: 63 IN | DIASTOLIC BLOOD PRESSURE: 96 MMHG | HEART RATE: 91 BPM | BODY MASS INDEX: 20.02 KG/M2

## 2023-05-01 PROCEDURE — 64405 NJX AA&/STRD GR OCPL NRV: CPT | Performed by: PSYCHIATRY & NEUROLOGY

## 2023-05-01 PROCEDURE — 64405 NJX AA&/STRD GR OCPL NRV: CPT

## 2023-05-01 PROCEDURE — 2500000003 HC RX 250 WO HCPCS

## 2023-05-01 RX ORDER — ETHYL CHLORIDE 100 %
AEROSOL, SPRAY (ML) TOPICAL PRN
Status: DISCONTINUED | OUTPATIENT
Start: 2023-05-01 | End: 2023-05-03 | Stop reason: HOSPADM

## 2023-05-01 RX ORDER — BUPIVACAINE HYDROCHLORIDE 2.5 MG/ML
5 INJECTION, SOLUTION EPIDURAL; INFILTRATION; INTRACAUDAL ONCE
Status: DISCONTINUED | OUTPATIENT
Start: 2023-05-01 | End: 2023-05-03 | Stop reason: HOSPADM

## 2023-05-01 NOTE — DISCHARGE INSTRUCTIONS
[address] [place] [address] Physical And Pain Medicine  Post Procedure Discharge Instructions        YOU HAVE HAD THE FOLLOWING PROCEDURE:                                  [x] Occipital Nerve Blocks  [] CTS wrist injection(s)  [] Knee Injection(s)         [] Shoulder Injection(s)   [] Elbow Injection(s)     [] Botox Injection  [] Cervical Trigger Point Injections    [] Thoracic Trigger Point Injections    [] Lumbar Trigger Point Injections  [] Piriformis Trigger Point Injections  [] SI Joint Injection(s)     [] Trochanteric Bursa Injection(s)       [] Ankle Injection(s)   [] Plantar Fasciitis   []  ______________  Injection(s) [] Botox []  Migraines [] Spasticity    YOU HAVE RECEIVED THE FOLLOWING MEDICATIONS IN YOUR INJECTION(s)  [] Lidocaine [x] Bupivacaine   [] DepoMedrol (steroid) [] Decadron (steroid)  []  Kenalog (steroid)   [] Toradol  [] Supartz [] Noreen Parkers    [] Botox        PATIENT INFORMATION:   You may experience the following symptoms after your procedure. These symptoms are normal and should not cause concern: You may have an increase in your pain. This may last 24 - 48 hours after your procedure. You may have no change in the pain that you had prior to your injection(s). You may have weakness or numbness in your affected extremity. If this occurs, this may last until numbing the medication wears off. REPORT THE FOLLOWING SYMPTOMS TO YOUR DOCTOR:  Redness, swelling or drainage at the injection site(s)  Unusual pain that interferes with your normal activities of daily living. OTHER INSTRUCTIONS:    [x] I will apply ice to the injection site(s) for at least 24 hours after the procedure. I will rotate the ice on for 20 minutes and off for 20 minutes for at least 24 hours. [] I will not apply heat for at least 48 hours and I will not take a hot bath or shower for at least 24 hours.      [x] I understand that if Lidocaine or Bupivacaine was used in my injection(s) that the injection site(s)

## 2023-05-01 NOTE — PROGRESS NOTES

## 2023-05-10 DIAGNOSIS — R51.9 NONINTRACTABLE HEADACHE, UNSPECIFIED CHRONICITY PATTERN, UNSPECIFIED HEADACHE TYPE: ICD-10-CM

## 2023-05-10 RX ORDER — OXYCODONE AND ACETAMINOPHEN 10; 325 MG/1; MG/1
1 TABLET ORAL EVERY 6 HOURS PRN
Qty: 45 TABLET | Refills: 0 | Status: SHIPPED | OUTPATIENT
Start: 2023-05-10 | End: 2023-06-09

## 2023-05-10 NOTE — TELEPHONE ENCOUNTER
Requested Prescriptions     Pending Prescriptions Disp Refills    oxyCODONE-acetaminophen (PERCOCET)  MG per tablet [Pharmacy Med Name: OXYCODONE/ACETAMINOPHEN 325MG-10MG TABLET] 45 tablet 0     Sig: TAKE 1 TABLET BY MOUTH EVERY 6 HOURS AS NEEDED FOR PAIN FOR UP TO 30 DAYS. MUST LAST 30 DAYS.        Last Office Visit:  4/6/2023  Next Office Visit:  7/6/2023  Last Medication Refill:  4/10/2023 with 0 MICHELLE Eid up to date:  3/15/2023     *RX updated to reflect   5/10/2023   fill date*

## 2023-05-22 ENCOUNTER — HOSPITAL ENCOUNTER (OUTPATIENT)
Dept: NEUROLOGY | Facility: HOSPITAL | Age: 58
Discharge: HOME | End: 2023-05-22
Admitting: NURSE PRACTITIONER
Payer: COMMERCIAL

## 2023-05-22 DIAGNOSIS — R20.2 NUMBNESS AND TINGLING OF RIGHT UPPER EXTREMITY: ICD-10-CM

## 2023-05-22 DIAGNOSIS — M79.601 PAIN OF RIGHT UPPER EXTREMITY: ICD-10-CM

## 2023-05-22 DIAGNOSIS — R20.0 NUMBNESS AND TINGLING OF RIGHT UPPER EXTREMITY: ICD-10-CM

## 2023-05-22 PROCEDURE — 95886 MUSC TEST DONE W/N TEST COMP: CPT

## 2023-05-22 PROCEDURE — 95909 NRV CNDJ TST 5-6 STUDIES: CPT

## 2023-05-31 ENCOUNTER — DOCUMENTATION (OUTPATIENT)
Dept: NEUROSURGERY | Facility: CLINIC | Age: 58
End: 2023-05-31

## 2023-06-06 ENCOUNTER — TRANSCRIBE ORDERS (OUTPATIENT)
Dept: ADMINISTRATIVE | Facility: HOSPITAL | Age: 58
End: 2023-06-06
Payer: COMMERCIAL

## 2023-06-06 DIAGNOSIS — Z12.31 ENCOUNTER FOR SCREENING MAMMOGRAM FOR MALIGNANT NEOPLASM OF BREAST: Primary | ICD-10-CM

## 2023-06-07 DIAGNOSIS — R51.9 NONINTRACTABLE HEADACHE, UNSPECIFIED CHRONICITY PATTERN, UNSPECIFIED HEADACHE TYPE: ICD-10-CM

## 2023-06-07 RX ORDER — OXYCODONE AND ACETAMINOPHEN 10; 325 MG/1; MG/1
1 TABLET ORAL EVERY 6 HOURS PRN
Qty: 45 TABLET | Refills: 0 | Status: SHIPPED | OUTPATIENT
Start: 2023-06-09 | End: 2023-07-09

## 2023-06-07 NOTE — TELEPHONE ENCOUNTER
Requested Prescriptions     Pending Prescriptions Disp Refills    oxyCODONE-acetaminophen (PERCOCET)  MG per tablet [Pharmacy Med Name: OXYCODONE/ACETAMINOPHEN 325MG-10MG TABLET] 45 tablet 0     Sig: Take 1 tablet by mouth every 6 hours as needed for Pain for up to 30 days. Must last 30 days.  Max Daily Amount: 4 tablets       Last Office Visit:  4/6/2023  Next Office Visit:  7/6/2023  Last Medication Refill:  5/10/23  Edy Fox up to date:  3/15/23    *RX updated to reflect   6/9/23  fill date*

## 2023-06-08 DIAGNOSIS — G56.01 CARPAL TUNNEL SYNDROME OF RIGHT WRIST: Primary | ICD-10-CM

## 2023-06-12 ENCOUNTER — OFFICE VISIT (OUTPATIENT)
Dept: NEUROSURGERY | Facility: CLINIC | Age: 58
End: 2023-06-12
Payer: COMMERCIAL

## 2023-06-12 ENCOUNTER — HOSPITAL ENCOUNTER (OUTPATIENT)
Dept: MAMMOGRAPHY | Facility: HOSPITAL | Age: 58
Discharge: HOME | End: 2023-06-12
Payer: MEDICARE

## 2023-06-12 ENCOUNTER — APPOINTMENT (OUTPATIENT)
Dept: OTHER | Facility: HOSPITAL | Age: 58
End: 2023-06-12
Payer: MEDICARE

## 2023-06-12 VITALS — BODY MASS INDEX: 19.67 KG/M2 | HEIGHT: 63 IN | WEIGHT: 111 LBS

## 2023-06-12 DIAGNOSIS — M54.2 CERVICALGIA: ICD-10-CM

## 2023-06-12 DIAGNOSIS — G56.01 CARPAL TUNNEL SYNDROME OF RIGHT WRIST: Primary | ICD-10-CM

## 2023-06-12 DIAGNOSIS — Z00.6 ENCOUNTER FOR EXAMINATION FOR NORMAL COMPARISON AND CONTROL IN CLINICAL RESEARCH PROGRAM: ICD-10-CM

## 2023-06-12 DIAGNOSIS — Z12.31 ENCOUNTER FOR SCREENING MAMMOGRAM FOR MALIGNANT NEOPLASM OF BREAST: ICD-10-CM

## 2023-06-12 DIAGNOSIS — Z72.0 TOBACCO ABUSE: ICD-10-CM

## 2023-06-12 PROCEDURE — 99213 OFFICE O/P EST LOW 20 MIN: CPT | Performed by: NEUROLOGICAL SURGERY

## 2023-06-12 PROCEDURE — 77067 SCR MAMMO BI INCL CAD: CPT

## 2023-06-12 PROCEDURE — 77063 BREAST TOMOSYNTHESIS BI: CPT

## 2023-06-12 RX ORDER — MELOXICAM 15 MG/1
15 TABLET ORAL DAILY
Qty: 30 TABLET | Refills: 0 | Status: SHIPPED | OUTPATIENT
Start: 2023-06-12 | End: 2023-06-12 | Stop reason: SDUPTHER

## 2023-06-12 RX ORDER — MELOXICAM 15 MG/1
15 TABLET ORAL DAILY
Qty: 30 TABLET | Refills: 0 | Status: SHIPPED | OUTPATIENT
Start: 2023-06-12
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
## 2023-07-03 ENCOUNTER — HOSPITAL ENCOUNTER (OUTPATIENT)
Dept: PAIN MANAGEMENT | Age: 58
Discharge: HOME | End: 2023-07-03
Payer: MEDICARE

## 2023-07-03 VITALS
RESPIRATION RATE: 18 BRPM | TEMPERATURE: 96.7 F | OXYGEN SATURATION: 93 % | HEART RATE: 73 BPM | SYSTOLIC BLOOD PRESSURE: 135 MMHG | DIASTOLIC BLOOD PRESSURE: 68 MMHG

## 2023-07-03 PROCEDURE — 64615 CHEMODENERV MUSC MIGRAINE: CPT

## 2023-07-03 PROCEDURE — 64405 NJX AA&/STRD GR OCPL NRV: CPT

## 2023-07-03 PROCEDURE — 64405 NJX AA&/STRD GR OCPL NRV: CPT | Performed by: PSYCHIATRY & NEUROLOGY

## 2023-07-03 PROCEDURE — 6360000002 HC RX W HCPCS

## 2023-07-03 RX ORDER — MELOXICAM 7.5 MG/1
7.5 TABLET ORAL DAILY
COMMUNITY
End: 2023-07-06 | Stop reason: ALTCHOICE

## 2023-07-03 RX ORDER — BUPIVACAINE HYDROCHLORIDE 2.5 MG/ML
5 INJECTION, SOLUTION EPIDURAL; INFILTRATION; INTRACAUDAL ONCE
Status: DISCONTINUED | OUTPATIENT
Start: 2023-07-03 | End: 2023-07-05 | Stop reason: HOSPADM

## 2023-07-03 RX ORDER — ETHYL CHLORIDE 100 %
AEROSOL, SPRAY (ML) TOPICAL PRN
Status: DISCONTINUED | OUTPATIENT
Start: 2023-07-03 | End: 2023-07-05 | Stop reason: HOSPADM

## 2023-07-03 NOTE — DISCHARGE INSTRUCTIONS
[address] [facility]  Post Procedure Discharge Instructions        YOU HAVE HAD THE FOLLOWING PROCEDURE:                                  [x] Occipital Nerve Blocks  [] CTS wrist injection(s)  [] Knee Injection(s)         [] Shoulder Injection(s)   [] Elbow Injection(s)     [] Botox Injection  [] Cervical Trigger Point Injections    [] Thoracic Trigger Point Injections    [] Lumbar Trigger Point Injections  [] Piriformis Trigger Point Injections  [] SI Joint Injection(s)     [] Trochanteric Bursa Injection(s)       [] Ankle Injection(s)   [] Plantar Fasciitis   []  ______________  Injection(s) [] Botox []  Migraines [] Spasticity    YOU HAVE RECEIVED THE FOLLOWING MEDICATIONS IN YOUR INJECTION(s)  [] Lidocaine [x] Bupivacaine   [] DepoMedrol (steroid) [] Decadron (steroid)  []  Kenalog (steroid)   [] Toradol  [] Supartz [] Kassi Melanie    [] Botox        PATIENT INFORMATION:   You may experience the following symptoms after your procedure. These symptoms are normal and should not cause concern: You may have an increase in your pain. This may last 24 - 48 hours after your procedure. You may have no change in the pain that you had prior to your injection(s). You may have weakness or numbness in your affected extremity. If this occurs, this may last until numbing the medication wears off. REPORT THE FOLLOWING SYMPTOMS TO YOUR DOCTOR:  Redness, swelling or drainage at the injection site(s)  Unusual pain that interferes with your normal activities of daily living. OTHER INSTRUCTIONS:    [x] I will apply ice to the injection site(s) for at least 24 hours after the procedure. I will rotate the ice on for 20 minutes and off for 20 minutes for at least 24 hours. [x] I will not apply heat for at least 48 hours and I will not take a hot bath or shower for at least 24 hours.      [x] I understand that if Lidocaine or Bupivacaine was used in my injection(s) that the injection site(s)

## 2023-07-06 ENCOUNTER — OFFICE VISIT (OUTPATIENT)
Dept: NEUROLOGY | Age: 58
End: 2023-07-06
Payer: MEDICARE

## 2023-07-06 VITALS
DIASTOLIC BLOOD PRESSURE: 98 MMHG | WEIGHT: 113 LBS | BODY MASS INDEX: 20.02 KG/M2 | HEIGHT: 63 IN | SYSTOLIC BLOOD PRESSURE: 157 MMHG | HEART RATE: 64 BPM

## 2023-07-06 DIAGNOSIS — G89.29 OTHER CHRONIC PAIN: ICD-10-CM

## 2023-07-06 DIAGNOSIS — M54.2 PAIN, NECK: ICD-10-CM

## 2023-07-06 DIAGNOSIS — R51.9 NONINTRACTABLE HEADACHE, UNSPECIFIED CHRONICITY PATTERN, UNSPECIFIED HEADACHE TYPE: ICD-10-CM

## 2023-07-06 DIAGNOSIS — G43.909 MIGRAINE WITHOUT STATUS MIGRAINOSUS, NOT INTRACTABLE, UNSPECIFIED MIGRAINE TYPE: Primary | ICD-10-CM

## 2023-07-06 PROCEDURE — 99213 OFFICE O/P EST LOW 20 MIN: CPT | Performed by: PSYCHIATRY & NEUROLOGY

## 2023-07-06 RX ORDER — MELOXICAM 15 MG/1
15 TABLET ORAL DAILY
COMMUNITY
Start: 2023-06-13

## 2023-07-06 RX ORDER — CARISOPRODOL 350 MG/1
350 TABLET ORAL 3 TIMES DAILY PRN
COMMUNITY

## 2023-07-06 RX ORDER — OXYCODONE AND ACETAMINOPHEN 10; 325 MG/1; MG/1
1 TABLET ORAL EVERY 6 HOURS PRN
Qty: 45 TABLET | Refills: 0 | Status: SHIPPED | OUTPATIENT
Start: 2023-07-09 | End: 2023-08-08
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
## 2023-07-06 NOTE — PROGRESS NOTES
July 6, 2023  Kayla Rome  1965            Order: Urine Drug Screen for Medication Management  Purpose to monitor patient compliance during active treatment with controlled or scheduled 2 narcotic substances. Profile Requested: [id number], [id number], [id number]  Diagnosis: Z79.899 -Other Long Term (current drug therapy)    Current Medications:  Current Outpatient Medications   Medication Sig Dispense Refill    meloxicam (MOBIC) 7.5 MG tablet Take 1 tablet by mouth daily      oxyCODONE-acetaminophen (PERCOCET)  MG per tablet Take 1 tablet by mouth every 6 hours as needed for Pain for up to 30 days. Must last 30 days. Max Daily Amount: 4 tablets 45 tablet 0    sertraline (ZOLOFT) 25 MG tablet Take 1 tablet by mouth daily      naloxone 4 MG/0.1ML LIQD nasal spray 1 spray by Nasal route as needed for Opioid Reversal 1 each 5    busPIRone (BUSPAR) 5 MG tablet Take 1 tablet by mouth daily      gabapentin (NEURONTIN) 800 MG tablet Take 1 tablet by mouth 3 times daily. rOPINIRole (REQUIP) 2 MG tablet Take 1 tablet by mouth daily      levETIRAcetam (KEPPRA) 500 MG tablet Take 1 tablet by mouth 2 times daily 60 tablet 2    benztropine (COGENTIN) 1 MG tablet Take 1 tablet by mouth daily      fenofibrate 160 MG tablet   3    promethazine (PHENERGAN) 25 MG tablet   5    lisinopril (PRINIVIL;ZESTRIL) 5 MG tablet 2 tablets      ABILIFY 5 MG tablet       clonazePAM (KLONOPIN) 0.5 MG tablet 3 times daily as needed       CYMBALTA 60 MG capsule       estradiol (CLIMARA) 0.1 MG/24HR       PREMARIN 1.25 MG tablet       zolpidem (AMBIEN CR) 12.5 MG CR tablet       PROMETHEGAN 25 MG suppository       pravastatin (PRAVACHOL) 20 MG tablet       levothyroxine (SYNTHROID) 88 MCG tablet        No current facility-administered medications for this visit.

## 2023-07-22 DIAGNOSIS — G56.01 CARPAL TUNNEL SYNDROME OF RIGHT WRIST: ICD-10-CM

## 2023-07-22 DIAGNOSIS — M54.2 CERVICALGIA: ICD-10-CM

## 2023-07-24 RX ORDER — MELOXICAM 15 MG/1
15 TABLET ORAL DAILY
Qty: 30 TABLET | Refills: 0 | Status: SHIPPED | OUTPATIENT
Start: 2023-07-24

## 2023-07-31 ENCOUNTER — HOSPITAL ENCOUNTER (OUTPATIENT)
Dept: PAIN MANAGEMENT | Age: 58
Discharge: HOME | End: 2023-07-31
Payer: MEDICARE

## 2023-07-31 VITALS
HEART RATE: 93 BPM | OXYGEN SATURATION: 96 % | TEMPERATURE: 96.9 F | DIASTOLIC BLOOD PRESSURE: 96 MMHG | RESPIRATION RATE: 18 BRPM | SYSTOLIC BLOOD PRESSURE: 192 MMHG

## 2023-07-31 PROCEDURE — 64405 NJX AA&/STRD GR OCPL NRV: CPT

## 2023-07-31 PROCEDURE — 6360000002 HC RX W HCPCS

## 2023-07-31 PROCEDURE — 64405 NJX AA&/STRD GR OCPL NRV: CPT | Performed by: PSYCHIATRY & NEUROLOGY

## 2023-07-31 RX ORDER — ETHYL CHLORIDE 100 %
AEROSOL, SPRAY (ML) TOPICAL PRN
Status: DISCONTINUED | OUTPATIENT
Start: 2023-07-31 | End: 2023-08-02 | Stop reason: HOSPADM

## 2023-07-31 RX ORDER — BUPIVACAINE HYDROCHLORIDE 2.5 MG/ML
5 INJECTION, SOLUTION EPIDURAL; INFILTRATION; INTRACAUDAL ONCE
Status: DISCONTINUED | OUTPATIENT
Start: 2023-07-31 | End: 2023-08-02 | Stop reason: HOSPADM

## 2023-07-31 NOTE — DISCHARGE INSTRUCTIONS
be numb for a few hours after the procedure    [] I understand if a steroid was used it will take effect in 3 - 7 days. I understand that as the numbing medication wears off, the pain may return until the steroids start working. [] I understand that today I will rest for the next 24 hours and drink plenty of water. [] For Botox for Migraines please do not wear anything constricting around the forehead for 7-10 days post injection. Examples headband, hats, or bandana    [] For Botox for Spasticity start therapy for the affected limb in two weeks. [] Additional instructions: ______________________________________________ ___________________________________________________________________    Sedation:  Was oral sedation given? [] Yes  [x] No    I understand that if I took an oral nerve calming medication I will not drive for  [] 24 hours after taking the medication. [x] I have received a copy of my discharge instructions and understand the above instructions to the best of my knowledge    Patient Discharged:  [x] Home  [] Hospital  [] Other  ____________________________________________    Via:  [x] Ambulatory  [] Wheelchair   [] Stretcher [] EMS       Accompanied By:   [] Significant other  [] Family Member  [] Friend   [] Hospital Staff  []  Ambulance Staff  [] Other_______________________________________________    Plan:  [] Will return to the office in   [] 1 month   [] 3 months for:  [] Procedure Follow-up  [] Office Visit   [x] Planned Procedure      Patient Signature: _____________________________________________________    Staff Signature: _______________________________________________________        If you have questions, problems or concerns you may call [phone] and follow the prompts.

## 2023-08-08 DIAGNOSIS — R51.9 NONINTRACTABLE HEADACHE, UNSPECIFIED CHRONICITY PATTERN, UNSPECIFIED HEADACHE TYPE: ICD-10-CM

## 2023-08-09 RX ORDER — OXYCODONE AND ACETAMINOPHEN 10; 325 MG/1; MG/1
1 TABLET ORAL EVERY 6 HOURS PRN
Qty: 45 TABLET | Refills: 0 | Status: SHIPPED | OUTPATIENT
Start: 2023-08-09 | End: 2023-09-08

## 2023-08-09 NOTE — TELEPHONE ENCOUNTER
Requested Prescriptions     Pending Prescriptions Disp Refills    oxyCODONE-acetaminophen (PERCOCET)  MG per tablet [Pharmacy Med Name: OXYCODONE/ACETAMINOPHEN 325MG-10MG TABLET] 45 tablet 0     Sig: TAKE 1 TABLET BY MOUTH EVERY 6 HOURS AS NEEDED FOR PAIN FOR UP TO 30 DAYS. MUST LAST 30 DAYS.  MAX DAILY AMOUNT: 4 TABLETS       Last Office Visit:  7/6/2023  Next Office Visit:  10/6/2023  Last Medication Refill:  7/9/2023 with 0 MICHELLE Keating up to date:  8/9/2023    *RX updated to reflect   8/9/2023  fill date*

## 2023-09-07 DIAGNOSIS — R51.9 NONINTRACTABLE HEADACHE, UNSPECIFIED CHRONICITY PATTERN, UNSPECIFIED HEADACHE TYPE: ICD-10-CM

## 2023-09-07 RX ORDER — OXYCODONE AND ACETAMINOPHEN 10; 325 MG/1; MG/1
1 TABLET ORAL EVERY 6 HOURS PRN
Qty: 45 TABLET | Refills: 0 | Status: SHIPPED | OUTPATIENT
Start: 2023-09-08 | End: 2023-10-08

## 2023-09-07 NOTE — TELEPHONE ENCOUNTER
Requested Prescriptions     Pending Prescriptions Disp Refills    oxyCODONE-acetaminophen (PERCOCET)  MG per tablet [Pharmacy Med Name: OXYCODONE/ACETAMINOPHEN 325MG-10MG TABLET] 45 tablet 0     Sig: Take 1 tablet by mouth every 6 hours as needed for Pain for up to 30 days. Must last 30 days.  Max Daily Amount: 4 tablets       Last Office Visit:  7/6/2023  Next Office Visit:  10/6/2023  Last Medication Refill:  8/9/23  Sandra Knight up to date:  8/9/23    *RX updated to reflect   9/8/23  fill date*    Yvon

## 2023-09-25 ENCOUNTER — HOSPITAL ENCOUNTER (OUTPATIENT)
Dept: PAIN MANAGEMENT | Age: 58
Discharge: HOME | End: 2023-09-25
Payer: MEDICARE

## 2023-09-25 VITALS
DIASTOLIC BLOOD PRESSURE: 82 MMHG | HEART RATE: 53 BPM | TEMPERATURE: 97.9 F | RESPIRATION RATE: 18 BRPM | OXYGEN SATURATION: 95 % | SYSTOLIC BLOOD PRESSURE: 124 MMHG

## 2023-09-25 DIAGNOSIS — G89.29 OTHER CHRONIC PAIN: ICD-10-CM

## 2023-09-25 DIAGNOSIS — R51.9 NONINTRACTABLE HEADACHE, UNSPECIFIED CHRONICITY PATTERN, UNSPECIFIED HEADACHE TYPE: ICD-10-CM

## 2023-09-25 PROCEDURE — 64405 NJX AA&/STRD GR OCPL NRV: CPT | Performed by: PSYCHIATRY & NEUROLOGY

## 2023-09-25 PROCEDURE — 64405 NJX AA&/STRD GR OCPL NRV: CPT

## 2023-09-25 PROCEDURE — 6360000002 HC RX W HCPCS

## 2023-09-25 RX ORDER — OXYCODONE AND ACETAMINOPHEN 10; 325 MG/1; MG/1
1 TABLET ORAL EVERY 6 HOURS PRN
Qty: 45 TABLET | Refills: 0 | Status: SHIPPED | OUTPATIENT
Start: 2023-10-08 | End: 2023-11-07

## 2023-09-25 RX ORDER — ETHYL CHLORIDE 100 %
AEROSOL, SPRAY (ML) TOPICAL PRN
Status: DISCONTINUED | OUTPATIENT
Start: 2023-09-25 | End: 2023-09-27 | Stop reason: HOSPADM

## 2023-09-25 RX ORDER — CARISOPRODOL 350 MG/1
TABLET ORAL
Qty: 90 TABLET | Refills: 5 | Status: SHIPPED | OUTPATIENT
Start: 2023-09-25 | End: 2023-10-24

## 2023-09-25 RX ORDER — BUPIVACAINE HYDROCHLORIDE 2.5 MG/ML
5 INJECTION, SOLUTION EPIDURAL; INFILTRATION; INTRACAUDAL ONCE
Status: DISCONTINUED | OUTPATIENT
Start: 2023-09-25 | End: 2023-09-27 | Stop reason: HOSPADM

## 2023-09-25 RX ORDER — BUTALBITAL, ACETAMINOPHEN, CAFFEINE AND CODEINE PHOSPHATE 50; 325; 40; 30 MG/1; MG/1; MG/1; MG/1
CAPSULE ORAL
Qty: 120 CAPSULE | Refills: 5 | Status: SHIPPED | OUTPATIENT
Start: 2023-09-25 | End: 2023-10-14

## 2023-09-25 NOTE — DISCHARGE INSTRUCTIONS
will be numb for a few hours after the procedure    [x] I understand if a steroid was used it will take effect in 3 - 7 days. I understand that as the numbing medication wears off, the pain may return until the steroids start working. [x] I understand that today I will rest for the next 24 hours and drink plenty of water. [] For Botox for Migraines please do not wear anything constricting around the forehead for 7-10 days post injection. Examples headband, hats, or bandana    [] For Botox for Spasticity start therapy for the affected limb in two weeks. [] Additional instructions: ______________________________________________ ___________________________________________________________________    Sedation:  Was oral sedation given? [] Yes  [x] No    I understand that if I took an oral nerve calming medication I will not drive for  [] 24 hours after taking the medication.     [x] I have received a copy of my discharge instructions and understand the above instructions to the best of my knowledge    Patient Discharged:  [x] Home  [] Hospital  [] Other  ____________________________________________    Via:  [x] Ambulatory  [] Wheelchair   [] Stretcher [] EMS       Accompanied By:   [] Significant other  [] Family Member  [] Friend   [] Hospital Staff  []  Ambulance Staff  [] Other_______________________________________________    Plan:  [x] Will return to the office in   [] 1 month   [] 3 months for:  [] Procedure Follow-up  [] Office Visit   [x] Planned Procedure      Patient Signature: _____________________________________________________    Staff Signature: _______________________________________________________        If you have questions, problems or concerns you may call [phone] and follow the prompts    Dr. Yue Aburto

## 2023-10-23 ENCOUNTER — HOSPITAL ENCOUNTER (OUTPATIENT)
Dept: PAIN MANAGEMENT | Age: 58
Discharge: HOME | End: 2023-10-23
Payer: MEDICARE

## 2023-10-23 VITALS
OXYGEN SATURATION: 95 % | DIASTOLIC BLOOD PRESSURE: 89 MMHG | TEMPERATURE: 96.6 F | SYSTOLIC BLOOD PRESSURE: 173 MMHG | HEART RATE: 59 BPM | RESPIRATION RATE: 18 BRPM

## 2023-10-23 PROCEDURE — 6360000002 HC RX W HCPCS

## 2023-10-23 PROCEDURE — 64405 NJX AA&/STRD GR OCPL NRV: CPT | Performed by: PSYCHIATRY & NEUROLOGY

## 2023-10-23 PROCEDURE — 64405 NJX AA&/STRD GR OCPL NRV: CPT

## 2023-10-23 RX ORDER — LIDOCAINE 50 MG/G
OINTMENT TOPICAL
Qty: 30 EACH | Refills: 5 | Status: SHIPPED | OUTPATIENT
Start: 2023-10-23

## 2023-10-23 RX ORDER — BUPIVACAINE HYDROCHLORIDE 2.5 MG/ML
5 INJECTION, SOLUTION EPIDURAL; INFILTRATION; INTRACAUDAL ONCE
Status: DISCONTINUED | OUTPATIENT
Start: 2023-10-23 | End: 2023-10-25 | Stop reason: HOSPADM

## 2023-10-23 NOTE — DISCHARGE INSTRUCTIONS
[address] [facility]  Post Procedure Discharge Instructions        YOU HAVE HAD THE FOLLOWING PROCEDURE:                                  [x] Occipital Nerve Blocks  [] CTS wrist injection(s)  [] Knee Injection(s)         [] Shoulder Injection(s)   [] Elbow Injection(s)     [] Botox Injection  [] Cervical Trigger Point Injections    [] Thoracic Trigger Point Injections    [] Lumbar Trigger Point Injections  [] Piriformis Trigger Point Injections  [] SI Joint Injection(s)     [] Trochanteric Bursa Injection(s)       [] Ankle Injection(s)   [] Plantar Fasciitis   []  ______________  Injection(s) [] Botox []  Migraines [] Spasticity    YOU HAVE RECEIVED THE FOLLOWING MEDICATIONS IN YOUR INJECTION(s)  [] Lidocaine [x] Bupivacaine   [] DepoMedrol (steroid) [] Decadron (steroid)  []  Kenalog (steroid)   [] Toradol  [] Supartz [] Graham Marus    [] Botox        PATIENT INFORMATION:   You may experience the following symptoms after your procedure. These symptoms are normal and should not cause concern: You may have an increase in your pain. This may last 24 - 48 hours after your procedure. You may have no change in the pain that you had prior to your injection(s). You may have weakness or numbness in your affected extremity. If this occurs, this may last until numbing the medication wears off. REPORT THE FOLLOWING SYMPTOMS TO YOUR DOCTOR:  Redness, swelling or drainage at the injection site(s)  Unusual pain that interferes with your normal activities of daily living. OTHER INSTRUCTIONS:    [x] I will apply ice to the injection site(s) for at least 24 hours after the procedure. I will rotate the ice on for 20 minutes and off for 20 minutes for at least 24 hours. [x] I will not apply heat for at least 48 hours and I will not take a hot bath or shower for at least 24 hours.      [x] I understand that if Lidocaine or Bupivacaine was used in my injection(s) that the injection site(s)

## 2023-11-08 DIAGNOSIS — R51.9 NONINTRACTABLE HEADACHE, UNSPECIFIED CHRONICITY PATTERN, UNSPECIFIED HEADACHE TYPE: ICD-10-CM

## 2023-11-08 RX ORDER — OXYCODONE AND ACETAMINOPHEN 10; 325 MG/1; MG/1
1 TABLET ORAL EVERY 6 HOURS PRN
Qty: 45 TABLET | Refills: 0 | Status: SHIPPED | OUTPATIENT
Start: 2023-11-08 | End: 2023-12-08

## 2023-11-08 NOTE — TELEPHONE ENCOUNTER
Requested Prescriptions     Pending Prescriptions Disp Refills    oxyCODONE-acetaminophen (PERCOCET)  MG per tablet [Pharmacy Med Name: OXYCODONE/ACETAMINOPHEN 325MG-10MG TABLET] 45 tablet 0     Sig: Take 1 tablet by mouth every 6 hours as needed for Pain for up to 30 days. Must last 30 days.  Max Daily Amount: 4 tablets       Last Office Visit:  7/6/2023  Next Office Visit:  11/22/2023  Last Medication Refill:  10/8/2023   Sukhi Reyez up to date: 8/9/2023     *RX updated to reflect   11/8/2023  fill date*

## 2023-12-09 ENCOUNTER — APPOINTMENT (OUTPATIENT)
Dept: CT IMAGING | Facility: HOSPITAL | Age: 58
End: 2023-12-09
Payer: MEDICARE

## 2023-12-09 ENCOUNTER — APPOINTMENT (OUTPATIENT)
Dept: GENERAL RADIOLOGY | Facility: HOSPITAL | Age: 58
End: 2023-12-09
Payer: MEDICARE

## 2023-12-09 ENCOUNTER — HOSPITAL ENCOUNTER (INPATIENT)
Facility: HOSPITAL | Age: 58
LOS: 3 days | Discharge: HOME | End: 2023-12-12
Attending: EMERGENCY MEDICINE | Admitting: FAMILY MEDICINE
Payer: MEDICARE

## 2023-12-09 DIAGNOSIS — R56.9 SEIZURE: ICD-10-CM

## 2023-12-09 DIAGNOSIS — E87.6 HYPOKALEMIA: ICD-10-CM

## 2023-12-09 DIAGNOSIS — R94.31 PROLONGED Q-T INTERVAL ON ECG: ICD-10-CM

## 2023-12-09 DIAGNOSIS — G40.909 SEIZURE DISORDER: Primary | ICD-10-CM

## 2023-12-09 DIAGNOSIS — E87.29 HIGH ANION GAP METABOLIC ACIDOSIS: ICD-10-CM

## 2023-12-09 DIAGNOSIS — Z79.899 POLYPHARMACY: ICD-10-CM

## 2023-12-09 DIAGNOSIS — R41.82 ALTERED MENTAL STATUS, UNSPECIFIED ALTERED MENTAL STATUS TYPE: ICD-10-CM

## 2023-12-09 DIAGNOSIS — E87.20 LACTIC ACIDOSIS: ICD-10-CM

## 2023-12-09 PROBLEM — F11.90 CHRONIC, CONTINUOUS USE OF OPIOIDS: Status: ACTIVE | Noted: 2023-12-09

## 2023-12-09 PROBLEM — E03.9 HYPOTHYROIDISM (ACQUIRED): Status: ACTIVE | Noted: 2023-12-09

## 2023-12-09 PROBLEM — F33.9 MAJOR DEPRESSION, RECURRENT: Status: ACTIVE | Noted: 2023-12-09

## 2023-12-09 LAB
ALBUMIN SERPL-MCNC: 4.5 G/DL (ref 3.5–5.2)
ALBUMIN/GLOB SERPL: 1.5 G/DL
ALP SERPL-CCNC: 116 U/L (ref 39–117)
ALT SERPL W P-5'-P-CCNC: 41 U/L (ref 1–33)
AMMONIA BLD-SCNC: 47 UMOL/L (ref 11–51)
AMPHET+METHAMPHET UR QL: NEGATIVE
AMPHETAMINES UR QL: NEGATIVE
ANION GAP SERPL CALCULATED.3IONS-SCNC: 12 MMOL/L (ref 5–15)
ANION GAP SERPL CALCULATED.3IONS-SCNC: 21 MMOL/L (ref 5–15)
APAP SERPL-MCNC: <5 MCG/ML (ref 0–30)
ARTERIAL PATENCY WRIST A: POSITIVE
AST SERPL-CCNC: 40 U/L (ref 1–32)
ATMOSPHERIC PRESS: 747 MMHG
BACTERIA UR QL AUTO: ABNORMAL /HPF
BARBITURATES UR QL SCN: NEGATIVE
BASE EXCESS BLDA CALC-SCNC: 6.4 MMOL/L (ref 0–2)
BASOPHILS # BLD AUTO: 0.03 10*3/MM3 (ref 0–0.2)
BASOPHILS NFR BLD AUTO: 0.2 % (ref 0–1.5)
BDY SITE: ABNORMAL
BENZODIAZ UR QL SCN: NEGATIVE
BILIRUB SERPL-MCNC: 0.6 MG/DL (ref 0–1.2)
BILIRUB UR QL STRIP: ABNORMAL
BODY TEMPERATURE: 37
BUN SERPL-MCNC: 13 MG/DL (ref 6–20)
BUN SERPL-MCNC: 15 MG/DL (ref 6–20)
BUN/CREAT SERPL: 18.8 (ref 7–25)
BUN/CREAT SERPL: 31 (ref 7–25)
BUPRENORPHINE SERPL-MCNC: NEGATIVE NG/ML
CALCIUM SPEC-SCNC: 8 MG/DL (ref 8.6–10.5)
CALCIUM SPEC-SCNC: 9.3 MG/DL (ref 8.6–10.5)
CANNABINOIDS SERPL QL: NEGATIVE
CHLORIDE SERPL-SCNC: 86 MMOL/L (ref 98–107)
CHLORIDE SERPL-SCNC: 96 MMOL/L (ref 98–107)
CK SERPL-CCNC: 1074 U/L (ref 20–180)
CLARITY UR: CLEAR
CO2 SERPL-SCNC: 26 MMOL/L (ref 22–29)
CO2 SERPL-SCNC: 28 MMOL/L (ref 22–29)
COCAINE UR QL: NEGATIVE
COLOR UR: ABNORMAL
CREAT SERPL-MCNC: 0.42 MG/DL (ref 0.57–1)
CREAT SERPL-MCNC: 0.8 MG/DL (ref 0.57–1)
CRP SERPL-MCNC: 4.96 MG/DL (ref 0–0.5)
D-LACTATE SERPL-SCNC: 1.9 MMOL/L (ref 0.5–2)
D-LACTATE SERPL-SCNC: 8.7 MMOL/L (ref 0.5–2)
DEPRECATED RDW RBC AUTO: 43.6 FL (ref 37–54)
EGFRCR SERPLBLD CKD-EPI 2021: 114.3 ML/MIN/1.73
EGFRCR SERPLBLD CKD-EPI 2021: 86.1 ML/MIN/1.73
EOSINOPHIL # BLD AUTO: 0.01 10*3/MM3 (ref 0–0.4)
EOSINOPHIL NFR BLD AUTO: 0.1 % (ref 0.3–6.2)
ERYTHROCYTE [DISTWIDTH] IN BLOOD BY AUTOMATED COUNT: 12.3 % (ref 12.3–15.4)
ETHANOL UR QL: <0.01 %
FENTANYL UR-MCNC: NEGATIVE NG/ML
GAS FLOW AIRWAY: 2 LPM
GLOBULIN UR ELPH-MCNC: 3.1 GM/DL
GLUCOSE SERPL-MCNC: 120 MG/DL (ref 65–99)
GLUCOSE SERPL-MCNC: 94 MG/DL (ref 65–99)
GLUCOSE UR STRIP-MCNC: NEGATIVE MG/DL
HCO3 BLDA-SCNC: 30.8 MMOL/L (ref 20–26)
HCT VFR BLD AUTO: 43.1 % (ref 34–46.6)
HGB BLD-MCNC: 14.9 G/DL (ref 12–15.9)
HGB UR QL STRIP.AUTO: ABNORMAL
HYALINE CASTS UR QL AUTO: ABNORMAL /LPF
IMM GRANULOCYTES # BLD AUTO: 0.08 10*3/MM3 (ref 0–0.05)
IMM GRANULOCYTES NFR BLD AUTO: 0.6 % (ref 0–0.5)
INR PPP: 0.82 (ref 0.91–1.09)
KETONES UR QL STRIP: ABNORMAL
LEUKOCYTE ESTERASE UR QL STRIP.AUTO: ABNORMAL
LYMPHOCYTES # BLD AUTO: 1.36 10*3/MM3 (ref 0.7–3.1)
LYMPHOCYTES NFR BLD AUTO: 9.7 % (ref 19.6–45.3)
Lab: ABNORMAL
MAGNESIUM SERPL-MCNC: 2.6 MG/DL (ref 1.6–2.6)
MCH RBC QN AUTO: 33.2 PG (ref 26.6–33)
MCHC RBC AUTO-ENTMCNC: 34.6 G/DL (ref 31.5–35.7)
MCV RBC AUTO: 96 FL (ref 79–97)
METHADONE UR QL SCN: NEGATIVE
MODALITY: ABNORMAL
MONOCYTES # BLD AUTO: 0.99 10*3/MM3 (ref 0.1–0.9)
MONOCYTES NFR BLD AUTO: 7.1 % (ref 5–12)
NEUTROPHILS NFR BLD AUTO: 11.5 10*3/MM3 (ref 1.7–7)
NEUTROPHILS NFR BLD AUTO: 82.3 % (ref 42.7–76)
NITRITE UR QL STRIP: NEGATIVE
NRBC BLD AUTO-RTO: 0 /100 WBC (ref 0–0.2)
OPIATES UR QL: NEGATIVE
OSMOLALITY SERPL: 281 MOSM/KG (ref 275–295)
OXYCODONE UR QL SCN: NEGATIVE
PCO2 BLDA: 42.1 MM HG (ref 35–45)
PCO2 TEMP ADJ BLD: 42.1 MM HG (ref 35–45)
PCP UR QL SCN: NEGATIVE
PH BLDA: 7.47 PH UNITS (ref 7.35–7.45)
PH UR STRIP.AUTO: 5.5 [PH] (ref 5–8)
PH, TEMP CORRECTED: 7.47 PH UNITS (ref 7.35–7.45)
PHOSPHATE SERPL-MCNC: 1.9 MG/DL (ref 2.5–4.5)
PHOSPHATE SERPL-MCNC: 2.6 MG/DL (ref 2.5–4.5)
PLATELET # BLD AUTO: 355 10*3/MM3 (ref 140–450)
PMV BLD AUTO: 10.2 FL (ref 6–12)
PO2 BLDA: 62 MM HG (ref 83–108)
PO2 TEMP ADJ BLD: 62 MM HG (ref 83–108)
POTASSIUM SERPL-SCNC: 2.3 MMOL/L (ref 3.5–5.2)
POTASSIUM SERPL-SCNC: 2.5 MMOL/L (ref 3.5–5.2)
PROCALCITONIN SERPL-MCNC: 0.08 NG/ML (ref 0–0.25)
PROT SERPL-MCNC: 7.6 G/DL (ref 6–8.5)
PROT UR QL STRIP: ABNORMAL
PROTHROMBIN TIME: 11.3 SECONDS (ref 11.8–14.8)
RBC # BLD AUTO: 4.49 10*6/MM3 (ref 3.77–5.28)
RBC # UR STRIP: ABNORMAL /HPF
REF LAB TEST METHOD: ABNORMAL
SALICYLATES SERPL-MCNC: <0.3 MG/DL
SAO2 % BLDCOA: 93.9 % (ref 94–99)
SODIUM SERPL-SCNC: 133 MMOL/L (ref 136–145)
SODIUM SERPL-SCNC: 136 MMOL/L (ref 136–145)
SP GR UR STRIP: 1.02 (ref 1–1.03)
SQUAMOUS #/AREA URNS HPF: ABNORMAL /HPF
TRICYCLICS UR QL SCN: NEGATIVE
TSH SERPL DL<=0.05 MIU/L-ACNC: 1.26 UIU/ML (ref 0.27–4.2)
UROBILINOGEN UR QL STRIP: ABNORMAL
VENTILATOR MODE: ABNORMAL
WBC # UR STRIP: ABNORMAL /HPF
WBC NRBC COR # BLD AUTO: 13.97 10*3/MM3 (ref 3.4–10.8)

## 2023-12-09 PROCEDURE — 84100 ASSAY OF PHOSPHORUS: CPT | Performed by: FAMILY MEDICINE

## 2023-12-09 PROCEDURE — 80053 COMPREHEN METABOLIC PANEL: CPT | Performed by: EMERGENCY MEDICINE

## 2023-12-09 PROCEDURE — 80307 DRUG TEST PRSMV CHEM ANLYZR: CPT | Performed by: EMERGENCY MEDICINE

## 2023-12-09 PROCEDURE — 99285 EMERGENCY DEPT VISIT HI MDM: CPT

## 2023-12-09 PROCEDURE — P9612 CATHETERIZE FOR URINE SPEC: HCPCS

## 2023-12-09 PROCEDURE — 71045 X-RAY EXAM CHEST 1 VIEW: CPT

## 2023-12-09 PROCEDURE — 25010000002 LEVETIRACETAM IN NACL 0.75% 1000 MG/100ML SOLUTION: Performed by: EMERGENCY MEDICINE

## 2023-12-09 PROCEDURE — 25010000002 LORAZEPAM PER 2 MG

## 2023-12-09 PROCEDURE — 70450 CT HEAD/BRAIN W/O DYE: CPT

## 2023-12-09 PROCEDURE — 85025 COMPLETE CBC W/AUTO DIFF WBC: CPT | Performed by: EMERGENCY MEDICINE

## 2023-12-09 PROCEDURE — 82803 BLOOD GASES ANY COMBINATION: CPT

## 2023-12-09 PROCEDURE — 82077 ASSAY SPEC XCP UR&BREATH IA: CPT | Performed by: EMERGENCY MEDICINE

## 2023-12-09 PROCEDURE — 25010000002 ENOXAPARIN PER 10 MG: Performed by: FAMILY MEDICINE

## 2023-12-09 PROCEDURE — 93005 ELECTROCARDIOGRAM TRACING: CPT | Performed by: EMERGENCY MEDICINE

## 2023-12-09 PROCEDURE — 84145 PROCALCITONIN (PCT): CPT | Performed by: EMERGENCY MEDICINE

## 2023-12-09 PROCEDURE — 83605 ASSAY OF LACTIC ACID: CPT | Performed by: EMERGENCY MEDICINE

## 2023-12-09 PROCEDURE — 80143 DRUG ASSAY ACETAMINOPHEN: CPT | Performed by: EMERGENCY MEDICINE

## 2023-12-09 PROCEDURE — 86140 C-REACTIVE PROTEIN: CPT | Performed by: EMERGENCY MEDICINE

## 2023-12-09 PROCEDURE — 84100 ASSAY OF PHOSPHORUS: CPT | Performed by: EMERGENCY MEDICINE

## 2023-12-09 PROCEDURE — 82140 ASSAY OF AMMONIA: CPT | Performed by: EMERGENCY MEDICINE

## 2023-12-09 PROCEDURE — 36415 COLL VENOUS BLD VENIPUNCTURE: CPT | Performed by: EMERGENCY MEDICINE

## 2023-12-09 PROCEDURE — 25010000002 POTASSIUM CHLORIDE 10 MEQ/100ML SOLUTION: Performed by: EMERGENCY MEDICINE

## 2023-12-09 PROCEDURE — 83735 ASSAY OF MAGNESIUM: CPT | Performed by: EMERGENCY MEDICINE

## 2023-12-09 PROCEDURE — 81001 URINALYSIS AUTO W/SCOPE: CPT | Performed by: EMERGENCY MEDICINE

## 2023-12-09 PROCEDURE — 99221 1ST HOSP IP/OBS SF/LOW 40: CPT | Performed by: PSYCHIATRY & NEUROLOGY

## 2023-12-09 PROCEDURE — 84443 ASSAY THYROID STIM HORMONE: CPT | Performed by: EMERGENCY MEDICINE

## 2023-12-09 PROCEDURE — 82550 ASSAY OF CK (CPK): CPT | Performed by: PSYCHIATRY & NEUROLOGY

## 2023-12-09 PROCEDURE — 25010000002 LEVETIRACETAM IN NACL 0.82% 500 MG/100ML SOLUTION: Performed by: PSYCHIATRY & NEUROLOGY

## 2023-12-09 PROCEDURE — 85610 PROTHROMBIN TIME: CPT | Performed by: EMERGENCY MEDICINE

## 2023-12-09 PROCEDURE — 36600 WITHDRAWAL OF ARTERIAL BLOOD: CPT

## 2023-12-09 PROCEDURE — 83930 ASSAY OF BLOOD OSMOLALITY: CPT | Performed by: EMERGENCY MEDICINE

## 2023-12-09 PROCEDURE — 80179 DRUG ASSAY SALICYLATE: CPT | Performed by: EMERGENCY MEDICINE

## 2023-12-09 RX ORDER — POLYETHYLENE GLYCOL 3350 17 G/17G
17 POWDER, FOR SOLUTION ORAL DAILY PRN
Status: DISCONTINUED | OUTPATIENT
Start: 2023-12-09 | End: 2023-12-12 | Stop reason: HOSPADM

## 2023-12-09 RX ORDER — LEVETIRACETAM 10 MG/ML
1000 INJECTION INTRAVASCULAR ONCE
Status: COMPLETED | OUTPATIENT
Start: 2023-12-09 | End: 2023-12-09

## 2023-12-09 RX ORDER — ROPINIROLE 1 MG/1
2 TABLET, FILM COATED ORAL NIGHTLY
Status: DISCONTINUED | OUTPATIENT
Start: 2023-12-09 | End: 2023-12-12 | Stop reason: HOSPADM

## 2023-12-09 RX ORDER — LISINOPRIL 5 MG/1
5 TABLET ORAL DAILY
Status: DISCONTINUED | OUTPATIENT
Start: 2023-12-09 | End: 2023-12-12 | Stop reason: HOSPADM

## 2023-12-09 RX ORDER — CLONAZEPAM 0.5 MG/1
0.5 TABLET ORAL EVERY 12 HOURS SCHEDULED
Status: DISCONTINUED | OUTPATIENT
Start: 2023-12-09 | End: 2023-12-12 | Stop reason: HOSPADM

## 2023-12-09 RX ORDER — SODIUM CHLORIDE 0.9 % (FLUSH) 0.9 %
10 SYRINGE (ML) INJECTION AS NEEDED
Status: DISCONTINUED | OUTPATIENT
Start: 2023-12-09 | End: 2023-12-12 | Stop reason: HOSPADM

## 2023-12-09 RX ORDER — OXYCODONE AND ACETAMINOPHEN 10; 325 MG/1; MG/1
1 TABLET ORAL EVERY 8 HOURS PRN
Status: DISCONTINUED | OUTPATIENT
Start: 2023-12-09 | End: 2023-12-12 | Stop reason: HOSPADM

## 2023-12-09 RX ORDER — BISACODYL 5 MG/1
5 TABLET, DELAYED RELEASE ORAL DAILY PRN
Status: DISCONTINUED | OUTPATIENT
Start: 2023-12-09 | End: 2023-12-12 | Stop reason: HOSPADM

## 2023-12-09 RX ORDER — GABAPENTIN 300 MG/1
600 CAPSULE ORAL EVERY 8 HOURS SCHEDULED
Status: DISCONTINUED | OUTPATIENT
Start: 2023-12-09 | End: 2023-12-12 | Stop reason: HOSPADM

## 2023-12-09 RX ORDER — ACETAMINOPHEN 325 MG/1
650 TABLET ORAL EVERY 4 HOURS PRN
Status: DISCONTINUED | OUTPATIENT
Start: 2023-12-09 | End: 2023-12-12 | Stop reason: HOSPADM

## 2023-12-09 RX ORDER — LEVETIRACETAM 5 MG/ML
500 INJECTION INTRAVASCULAR EVERY 12 HOURS SCHEDULED
Status: DISCONTINUED | OUTPATIENT
Start: 2023-12-09 | End: 2023-12-11

## 2023-12-09 RX ORDER — POTASSIUM CHLORIDE 750 MG/1
40 CAPSULE, EXTENDED RELEASE ORAL
Qty: 12 CAPSULE | Refills: 0 | Status: DISCONTINUED | OUTPATIENT
Start: 2023-12-09 | End: 2023-12-09

## 2023-12-09 RX ORDER — CHLORHEXIDINE GLUCONATE 500 MG/1
1 CLOTH TOPICAL EVERY 24 HOURS
Status: DISCONTINUED | OUTPATIENT
Start: 2023-12-10 | End: 2023-12-10

## 2023-12-09 RX ORDER — SODIUM CHLORIDE 0.9 % (FLUSH) 0.9 %
10 SYRINGE (ML) INJECTION EVERY 12 HOURS SCHEDULED
Status: DISCONTINUED | OUTPATIENT
Start: 2023-12-09 | End: 2023-12-12 | Stop reason: HOSPADM

## 2023-12-09 RX ORDER — LORAZEPAM 2 MG/ML
INJECTION INTRAMUSCULAR
Status: COMPLETED
Start: 2023-12-09 | End: 2023-12-09

## 2023-12-09 RX ORDER — SODIUM CHLORIDE 9 MG/ML
40 INJECTION, SOLUTION INTRAVENOUS AS NEEDED
Status: DISCONTINUED | OUTPATIENT
Start: 2023-12-09 | End: 2023-12-12 | Stop reason: HOSPADM

## 2023-12-09 RX ORDER — ARIPIPRAZOLE 10 MG/1
10 TABLET ORAL EVERY 24 HOURS
Status: DISCONTINUED | OUTPATIENT
Start: 2023-12-09 | End: 2023-12-12 | Stop reason: HOSPADM

## 2023-12-09 RX ORDER — POTASSIUM CHLORIDE 750 MG/1
40 CAPSULE, EXTENDED RELEASE ORAL
Status: DISCONTINUED | OUTPATIENT
Start: 2023-12-09 | End: 2023-12-09

## 2023-12-09 RX ORDER — ENOXAPARIN SODIUM 100 MG/ML
40 INJECTION SUBCUTANEOUS
Status: DISCONTINUED | OUTPATIENT
Start: 2023-12-09 | End: 2023-12-12 | Stop reason: HOSPADM

## 2023-12-09 RX ORDER — BISACODYL 10 MG
10 SUPPOSITORY, RECTAL RECTAL DAILY PRN
Status: DISCONTINUED | OUTPATIENT
Start: 2023-12-09 | End: 2023-12-12 | Stop reason: HOSPADM

## 2023-12-09 RX ORDER — AMOXICILLIN 250 MG
2 CAPSULE ORAL 2 TIMES DAILY
Status: DISCONTINUED | OUTPATIENT
Start: 2023-12-09 | End: 2023-12-12 | Stop reason: HOSPADM

## 2023-12-09 RX ORDER — CLONAZEPAM 0.5 MG/1
0.5 TABLET ORAL NIGHTLY
Status: DISCONTINUED | OUTPATIENT
Start: 2023-12-09 | End: 2023-12-09

## 2023-12-09 RX ORDER — POTASSIUM CHLORIDE 20MEQ/15ML
40 LIQUID (ML) ORAL
Status: COMPLETED | OUTPATIENT
Start: 2023-12-09 | End: 2023-12-10

## 2023-12-09 RX ORDER — LEVOTHYROXINE SODIUM 88 UG/1
88 TABLET ORAL
Status: DISCONTINUED | OUTPATIENT
Start: 2023-12-10 | End: 2023-12-12 | Stop reason: HOSPADM

## 2023-12-09 RX ORDER — CODEINE/BUTALBITAL/ASA/CAFFEIN 30-50-325
1 CAPSULE ORAL EVERY 4 HOURS PRN
Status: DISCONTINUED | OUTPATIENT
Start: 2023-12-09 | End: 2023-12-11

## 2023-12-09 RX ORDER — LORAZEPAM 2 MG/ML
1 INJECTION INTRAMUSCULAR ONCE
Status: COMPLETED | OUTPATIENT
Start: 2023-12-09 | End: 2023-12-09

## 2023-12-09 RX ORDER — PRAVASTATIN SODIUM 20 MG
20 TABLET ORAL EVERY 24 HOURS
Status: DISCONTINUED | OUTPATIENT
Start: 2023-12-09 | End: 2023-12-12 | Stop reason: HOSPADM

## 2023-12-09 RX ORDER — CHLORHEXIDINE GLUCONATE 500 MG/1
1 CLOTH TOPICAL ONCE
Status: DISCONTINUED | OUTPATIENT
Start: 2023-12-09 | End: 2023-12-10

## 2023-12-09 RX ORDER — POTASSIUM CHLORIDE 7.45 MG/ML
10 INJECTION INTRAVENOUS
Status: COMPLETED | OUTPATIENT
Start: 2023-12-09 | End: 2023-12-09

## 2023-12-09 RX ADMIN — PRAVASTATIN SODIUM 20 MG: 20 TABLET ORAL at 15:08

## 2023-12-09 RX ADMIN — POTASSIUM CHLORIDE 10 MEQ: 10 INJECTION, SOLUTION INTRAVENOUS at 10:58

## 2023-12-09 RX ADMIN — SERTRALINE HYDROCHLORIDE 25 MG: 50 TABLET, FILM COATED ORAL at 15:08

## 2023-12-09 RX ADMIN — LORAZEPAM 1 MG: 2 INJECTION INTRAMUSCULAR at 09:38

## 2023-12-09 RX ADMIN — ACETAMINOPHEN 650 MG: 325 TABLET, FILM COATED ORAL at 15:58

## 2023-12-09 RX ADMIN — Medication 1 APPLICATION: at 17:59

## 2023-12-09 RX ADMIN — LEVETIRACETAM 1000 MG: 10 INJECTION INTRAVASCULAR at 09:38

## 2023-12-09 RX ADMIN — Medication 10 ML: at 20:30

## 2023-12-09 RX ADMIN — GABAPENTIN 600 MG: 300 CAPSULE ORAL at 15:08

## 2023-12-09 RX ADMIN — LISINOPRIL 5 MG: 5 TABLET ORAL at 15:09

## 2023-12-09 RX ADMIN — ROPINIROLE HYDROCHLORIDE 2 MG: 1 TABLET, FILM COATED ORAL at 20:29

## 2023-12-09 RX ADMIN — GABAPENTIN 600 MG: 300 CAPSULE ORAL at 22:20

## 2023-12-09 RX ADMIN — LEVETIRACETAM 500 MG: 5 INJECTION INTRAVASCULAR at 20:30

## 2023-12-09 RX ADMIN — POTASSIUM CHLORIDE 40 MEQ: 20 SOLUTION ORAL at 22:20

## 2023-12-09 RX ADMIN — ENOXAPARIN SODIUM 40 MG: 100 INJECTION SUBCUTANEOUS at 15:08

## 2023-12-09 RX ADMIN — ARIPIPRAZOLE 10 MG: 10 TABLET ORAL at 15:09

## 2023-12-09 RX ADMIN — CLONAZEPAM 0.5 MG: 0.5 TABLET ORAL at 20:29

## 2023-12-09 RX ADMIN — POTASSIUM CHLORIDE 10 MEQ: 10 INJECTION, SOLUTION INTRAVENOUS at 09:57

## 2023-12-09 RX ADMIN — LORAZEPAM 1 MG: 2 INJECTION INTRAMUSCULAR; INTRAVENOUS at 09:38

## 2023-12-09 RX ADMIN — POTASSIUM CHLORIDE 40 MEQ: 10 CAPSULE, COATED, EXTENDED RELEASE ORAL at 18:20

## 2023-12-09 RX ADMIN — LEVETIRACETAM 1000 MG: 10 INJECTION INTRAVASCULAR at 09:45

## 2023-12-09 NOTE — ED PROVIDER NOTES
Subjective   History of Present Illness  Patient with altered mental status came to the ED with level complaints via EMS.  Last well-known is under to mind at this time.  Patient follows commands but appears confused.  No focal neurological deficits.  No history any trauma.  No over dosages.    Altered Mental Status  Presenting symptoms: confusion and disorientation    Severity:  Moderate  Most recent episode:  Today  Episode history:  Single  Timing:  Constant  Progression:  Unchanged  Chronicity:  New  Context: not alcohol use, not dementia, not drug use, not head injury, not nursing home resident, not recent change in medication and not recent illness    Associated symptoms: weakness    Associated symptoms: no abdominal pain, no agitation, no bladder incontinence, no difficulty breathing, no headaches, no light-headedness, no seizures and no slurred speech        Review of Systems   Unable to perform ROS: Mental status change   Gastrointestinal:  Negative for abdominal pain.   Genitourinary:  Negative for bladder incontinence.   Neurological:  Positive for weakness. Negative for seizures, light-headedness and headaches.   Psychiatric/Behavioral:  Positive for confusion. Negative for agitation.        Past Medical History:   Diagnosis Date    Fibromyalgia     Hyperlipidemia     Hypertension     Hypothyroidism     Low back pain     Migraine     Neuropathy        Allergies   Allergen Reactions    Morphine Hives       Past Surgical History:   Procedure Laterality Date    APPENDECTOMY      HYSTERECTOMY      OOPHORECTOMY         Family History   Problem Relation Age of Onset    Cancer Mother     Diabetes Mother     Cancer Father        Social History     Socioeconomic History    Marital status: Single   Tobacco Use    Smoking status: Every Day     Packs/day: 1     Types: Cigarettes    Smokeless tobacco: Never   Substance and Sexual Activity    Alcohol use: No    Drug use: No    Sexual activity: Defer            Objective   Physical Exam  Vitals and nursing note reviewed. Exam conducted with a chaperone present.   Constitutional:       General: She is not in acute distress.     Appearance: Normal appearance. She is underweight. She is ill-appearing. She is not toxic-appearing.   HENT:      Head: Normocephalic and atraumatic.      Comments: No hemotympanum     Right Ear: Tympanic membrane normal.      Left Ear: Tympanic membrane normal.      Nose: Nose normal.      Mouth/Throat:      Mouth: Mucous membranes are moist.      Pharynx: Oropharynx is clear. No oropharyngeal exudate.   Eyes:      General: No scleral icterus.     Conjunctiva/sclera: Conjunctivae normal.      Pupils: Pupils are equal, round, and reactive to light.      Comments: No hyphema no conjunctival hemorrhages   Neck:      Vascular: No carotid bruit.      Meningeal: Brudzinski's sign and Kernig's sign absent.      Comments: No nuchal rigidity  Cardiovascular:      Rate and Rhythm: Normal rate and regular rhythm.      Pulses: Normal pulses.      Heart sounds: Normal heart sounds. No murmur heard.     No friction rub. No gallop.   Pulmonary:      Effort: Pulmonary effort is normal.      Breath sounds: Normal breath sounds. No stridor. No wheezing, rhonchi or rales.   Chest:      Chest wall: No tenderness.   Abdominal:      General: Abdomen is flat. There is no distension.      Palpations: There is no mass.      Tenderness: There is no abdominal tenderness. There is no rebound.   Musculoskeletal:         General: No swelling or tenderness. Normal range of motion.      Cervical back: Normal range of motion and neck supple. No rigidity. No muscular tenderness.      Right lower leg: No edema.      Left lower leg: No edema.   Lymphadenopathy:      Cervical: No cervical adenopathy.   Skin:     General: Skin is warm.      Capillary Refill: Capillary refill takes 2 to 3 seconds. Capillary refill takes less than 2 seconds.      Coloration: Skin is not ashen,  cyanotic, jaundiced, mottled or pale.      Findings: No bruising, erythema, lesion or rash.   Neurological:      General: No focal deficit present.      Mental Status: She is alert. She is disoriented and confused.      GCS: GCS eye subscore is 4. GCS verbal subscore is 5. GCS motor subscore is 6.      Cranial Nerves: Cranial nerves 2-12 are intact. No cranial nerve deficit or facial asymmetry.      Motor: Motor function is intact. No weakness, tremor, atrophy, abnormal muscle tone or seizure activity.      Coordination: Coordination is intact. Coordination normal.      Deep Tendon Reflexes: Reflexes are normal and symmetric. Babinski sign absent on the right side. Babinski sign absent on the left side.      Reflex Scores:       Bicep reflexes are 2+ on the right side and 2+ on the left side.       Patellar reflexes are 2+ on the right side and 2+ on the left side.  Psychiatric:         Mood and Affect: Mood normal.         Speech: Speech normal.         Behavior: Behavior normal.         Thought Content: Thought content normal.         Judgment: Judgment normal.         Procedures           ED Course  ED Course as of 12/09/23 1037   Sat Dec 09, 2023   0828 Normal sinus rhythm with a prolonged QT [TS]   1023 Neurology consulted [TS]   1024 With altered mental status she has mild seizures with medication on board.  Had 2 episode of seizure with a tongue contusion was given tetanus in the ED.  Patient received Ativan and 2 g of Keppra I have discussed this case the patient's daughter she had a history of seizure when she was young but has not had any seizures since then.  There is no history any trauma.  Patient will be admitted for further evaluation and treatment. [TS]   1028 Discussed with the patient's family the patient's prognosis guarded with his multitude of seizures and altered mental status and confusion which has persisted.  Will be admitted to ICU for further evaluation assessment. [TS]      ED Course User  Index  [TS] Kolton Christina MD                          Total (NIH Stroke Scale): 5                  Medical Decision Making  Differential Diagnosis:  I considered toxic-metabolic etiology, hypoglycemia, hyperglycemia, diabetic ketoacidosis, drug overdose, ethanol intoxication, thiamine deficiency, hypothermia, hyponatremia, hypernatremia, organ failure, liver failure, kidney failure, thyroid failure, adrenal failure, hypoxia, hypercarbia, ischemic stroke, intracranial bleed, subarachnoid hemorrhage, closed head injury, subdural hematoma, seizure activity, syncopal episode, infectious etiology, hypertensive encephalopathy, vasculitis, thrombotic thrombocytopenic purpura and disseminated intravascular coagulation as a possible cause of altered mental status in this patient. This is a partial list of diagnoses considered.             Problems Addressed:  Altered mental status, unspecified altered mental status type: complicated acute illness or injury     Details: Patient will ultimately status polypharmacy on board with seizures.  Will be admitted CT of the head is negative for any acute abnormity pathology.  High anion gap metabolic acidosis: complicated acute illness or injury     Details: Probably secondary to seizure the lactic acidosis caused by that.  Hypokalemia: complicated acute illness or injury     Details: Replace with IV potassium  Lactic acidosis: complicated acute illness or injury     Details: Lactic acidosis is secondary to seizure is not related to sepsis.  Polypharmacy: complicated acute illness or injury     Details: Polypharmacy multitude of mood altering medications.  Contributing to altered mental status.  Prolonged Q-T interval on ECG: complicated acute illness or injury  Seizure disorder: complicated acute illness or injury     Details: Patient with couple of seizure episodes today    Amount and/or Complexity of Data Reviewed  Labs: ordered.     Details: Lab workup reviewed  Radiology:  ordered.     Details: Scans negative  ECG/medicine tests: ordered.     Details: Prolonged QT  Discussion of management or test interpretation with external provider(s): Discussed with neurology discussed with the pts primary MD    Risk  Prescription drug management.  Decision regarding hospitalization.  Risk Details: Patient with a seizure disorder with couple episode of seizures altered mental status polypharmacy on board.  No acute event evidence of infection.  Will be admitted to medicine service.        Final diagnoses:   Seizure disorder   Altered mental status, unspecified altered mental status type   Polypharmacy   Lactic acidosis   Hypokalemia   Prolonged Q-T interval on ECG   High anion gap metabolic acidosis       ED Disposition  ED Disposition       ED Disposition   Decision to Admit    Condition   --    Comment   Level of Care: Critical Care [6]   Diagnosis: Seizure [[id number]]   Admitting Physician: AVELINO LOBO [[id number]]   Attending Physician: AVELINO LOBO [[id number]]   Certification: I Certify That Inpatient Hospital Services Are Medically Necessary For Greater Than 2 Midnights                 No follow-up provider specified.       Medication List      No changes were made to your prescriptions during this visit.            Kolton Christina MD  12/09/23 0828       Kolton Christina MD  12/09/23 1030       Kolton Christina MD  12/09/23 1032       Kolton Christina MD  12/09/23 1036       Kolton Christina MD  12/09/23 1037

## 2023-12-09 NOTE — ED NOTES
Nursing report ED to floor  Destiny Morelos  58 y.o.  female    HPI:   Chief Complaint   Patient presents with    Altered Mental Status       Admitting doctor:   London Rascon MD    Consulting provider(s):  Consults       No orders found from 11/10/2023 to 12/10/2023.             Admitting diagnosis:   The primary encounter diagnosis was Seizure disorder. Diagnoses of Altered mental status, unspecified altered mental status type, Polypharmacy, Lactic acidosis, Hypokalemia, Prolonged Q-T interval on ECG, and High anion gap metabolic acidosis were also pertinent to this visit.    Code status:   Current Code Status       Date Active Code Status Order ID Comments User Context       Not on file            Allergies:   Morphine    Intake and Output    Intake/Output Summary (Last 24 hours) at 12/9/2023 1124  Last data filed at 12/9/2023 0955  Gross per 24 hour   Intake 200 ml   Output --   Net 200 ml       Weight:       12/09/23  0819   Weight: 59 kg (130 lb)       Most recent vitals:   Vitals:    12/09/23 1007 12/09/23 1032 12/09/23 1047 12/09/23 1118   BP: (!) 154/103 (!) 137/115 (!) 139/125 143/81   Pulse: 120 112 104 104   Resp:    20   Temp:    99.7 °F (37.6 °C)   TempSrc:    Oral   SpO2: 96% 97% 100% 100%   Weight:       Height:         Oxygen Therapy: .2 L NC 02    Active LDAs/IV Access:   Lines, Drains & Airways       Active LDAs       Name Placement date Placement time Site Days    Peripheral IV 12/09/23 Left Antecubital 12/09/23  --  Antecubital  less than 1    Peripheral IV 12/09/23 0955 Right Antecubital 12/09/23  0955  Antecubital  less than 1                    Labs (abnormal labs have a star):   Labs Reviewed   COMPREHENSIVE METABOLIC PANEL - Abnormal; Notable for the following components:       Result Value    Glucose 120 (*)     Sodium 133 (*)     Potassium 2.3 (*)     Chloride 86 (*)     ALT (SGPT) 41 (*)     AST (SGOT) 40 (*)     Anion Gap 21.0 (*)     All other components within normal limits     Narrative:     GFR Normal >60  Chronic Kidney Disease <60  Kidney Failure <15     PROTIME-INR - Abnormal; Notable for the following components:    Protime 11.3 (*)     INR 0.82 (*)     All other components within normal limits   URINALYSIS W/ CULTURE IF INDICATED - Abnormal; Notable for the following components:    Color, UA Dark Yellow (*)     Ketones, UA 15 mg/dL (1+) (*)     Bilirubin, UA Small (1+) (*)     Blood, UA Trace (*)     Protein, UA 30 mg/dL (1+) (*)     Leuk Esterase, UA Trace (*)     All other components within normal limits    Narrative:     In absence of clinical symptoms, the presence of pyuria, bacteria, and/or nitrites on the urinalysis result does not correlate with infection.   LACTIC ACID, PLASMA - Abnormal; Notable for the following components:    Lactate 8.7 (*)     All other components within normal limits   C-REACTIVE PROTEIN - Abnormal; Notable for the following components:    C-Reactive Protein 4.96 (*)     All other components within normal limits   PHOSPHORUS - Abnormal; Notable for the following components:    Phosphorus 1.9 (*)     All other components within normal limits   CBC WITH AUTO DIFFERENTIAL - Abnormal; Notable for the following components:    WBC 13.97 (*)     MCH 33.2 (*)     Neutrophil % 82.3 (*)     Lymphocyte % 9.7 (*)     Eosinophil % 0.1 (*)     Immature Grans % 0.6 (*)     Neutrophils, Absolute 11.50 (*)     Monocytes, Absolute 0.99 (*)     Immature Grans, Absolute 0.08 (*)     All other components within normal limits   BLOOD GAS, ARTERIAL - Abnormal; Notable for the following components:    pH, Arterial 7.472 (*)     pO2, Arterial 62.0 (*)     HCO3, Arterial 30.8 (*)     Base Excess, Arterial 6.4 (*)     O2 Saturation, Arterial 93.9 (*)     pH, Temp Corrected 7.472 (*)     pO2, Temperature Corrected 62.0 (*)     All other components within normal limits   URINALYSIS, MICROSCOPIC ONLY - Abnormal; Notable for the following components:    Bacteria, UA Trace (*)      "Squamous Epithelial Cells, UA 3-6 (*)     All other components within normal limits   PROCALCITONIN - Normal    Narrative:     As a Marker for Sepsis (Non-Neonates):    1. <0.5 ng/mL represents a low risk of severe sepsis and/or septic shock.  2. >2 ng/mL represents a high risk of severe sepsis and/or septic shock.    As a Marker for Lower Respiratory Tract Infections that require antibiotic therapy:    PCT on Admission    Antibiotic Therapy       6-12 Hrs later    >0.5                Strongly Recommended  >0.25 - <0.5        Recommended   0.1 - 0.25          Discouraged              Remeasure/reassess PCT  <0.1                Strongly Discouraged     Remeasure/reassess PCT    As 28 day mortality risk marker: \"Change in Procalcitonin Result\" (>80% or <=80%) if Day 0 (or Day 1) and Day 4 values are available. Refer to http://www.[employer] Powerpct-calculator.com    Change in PCT <=80%  A decrease of PCT levels below or equal to 80% defines a positive change in PCT test result representing a higher risk for 28-day all-cause mortality of patients diagnosed with severe sepsis for septic shock.    Change in PCT >80%  A decrease of PCT levels of more than 80% defines a negative change in PCT result representing a lower risk for 28-day all-cause mortality of patients diagnosed with severe sepsis or septic shock.      ACETAMINOPHEN LEVEL - Normal   URINE DRUG SCREEN - Normal    Narrative:     Cutoff For Drugs Screened:    Amphetamines               500 ng/ml  Barbiturates               200 ng/ml  Benzodiazepines            150 ng/ml  Cocaine                    150 ng/ml  Methadone                  200 ng/ml  Opiates                    100 ng/ml  Phencyclidine               25 ng/ml  THC                         50 ng/ml  Methamphetamine            500 ng/ml  Tricyclic Antidepressants  300 ng/ml  Oxycodone                  100 ng/ml  Buprenorphine               10 ng/ml    The normal value for all drugs tested is negative. This " report includes unconfirmed screening results, with the cutoff values listed, to be used for medical treatment purposes only.  Unconfirmed results must not be used for non-medical purposes such as employment or legal testing.  Clinical consideration should be applied to any drug of abuse test, particularly when unconfirmed results are used.     SALICYLATE LEVEL - Normal   AMMONIA - Normal   MAGNESIUM - Normal   TSH - Normal   OSMOLALITY, SERUM - Normal   FENTANYL, URINE - Normal    Narrative:     Negative Threshold:      Fentanyl 5 ng/mL     The normal value for the drug tested is negative. This report includes final unconfirmed screening results to be used for medical treatment purposes only. Unconfirmed results must not be used for non-medical purposes such as employment or legal testing. Clinical consideration should be applied to any drug of abuse test, particularly when unconfirmed results are used.          BLOOD GAS, ARTERIAL   ETHANOL    Narrative:     Not for legal purposes. Chain of Custody not followed.    LACTIC ACID, REFLEX   CBC AND DIFFERENTIAL    Narrative:     The following orders were created for panel order CBC & Differential.  Procedure                               Abnormality         Status                     ---------                               -----------         ------                     CBC Auto Differential[[id number]]        Abnormal            Final result                 Please view results for these tests on the individual orders.       Meds given in ED:   Medications   potassium chloride 10 mEq in 100 mL IVPB (10 mEq Intravenous New Bag 12/9/23 1058)   levETIRAcetam in NaCl 0.75% (KEPPRA) IVPB 1,000 mg (0 mg Intravenous Stopped 12/9/23 0955)   LORazepam (ATIVAN) injection 1 mg (1 mg Intravenous Given 12/9/23 0938)   levETIRAcetam in NaCl 0.75% (KEPPRA) IVPB 1,000 mg (0 mg Intravenous Stopped 12/9/23 0948)           NIH Stroke Scale:  Interval: baseline      Isolation/Infection(s):  No active isolations   No active infections     COVID Testing  Collected .  Resulted .    Nursing report ED to floor:  Mental status: .gcs 12  Ambulatory status: .non amb  Precautions: .seizures    ED nurse phone extentsion- .. [phone]    Report to paulino spangler and care transferred CCU

## 2023-12-09 NOTE — CONSULTS
NEUROLOGY CONSULT     DOS: 2023  NAME: Destiny Morelos   : 1965  PCP: London Rascon MD  CC: Stroke  Referring MD: No ref. provider found      Neurological Problem and Interval History:  58 y.o. female with psychiatric conditions, polypharmacy and h/o childhood seiure presents with chief complaint of AMS and witnessed seizure. Patient has a history of childhood seizures as well as a family history of seizures. She has polypharmacy and appeared to be feeling unwell x1 week with GI symptoms and told her daughter she was out of her medications. Unclear if she was unwell and then ran out of certain meds or if her symptoms were from withdrawal. She had a witnessed seizure and was loaded with keppra. She was drowsy and post-ictal but neurologically non-focal. Daughter at bedside notes that she has had a lot of unintentional weight loss. Daughter also notes that she has a history of taking her medications incorrectly or too much.Patient was found to have elevated LFTs, low potassium, elevated  lactate, and leukocytosis with temperature 100.3 (has received tylenol). She ahs had recent exposure to strep throat. She is not on AEDs.  Patient will may have an underlying seizure disorder and this may represent a breakthrough seizure in the setting of possible illness versus medication withdrawal.  However, to be cautious we will also continue to entertain other etiologies such as serotonin syndrome or less likely NMS.  Patient has extensive polypharmacy and from interviewing both the patient and both daughters the patient is fairly unreliable at this current time, it appears that patient has a tendency to overtake amounts of medications.  Given her UDS is unremarkable however and concern for benzodiazepine withdrawal.  Defer to the primary team for further investigation given her recent weight loss as well as infectious evaluation.      Past Medical/Surgical Hx:  Past Medical History:   Diagnosis Date     Fibromyalgia     Hyperlipidemia     Hypertension     Hypothyroidism     Low back pain     Migraine     Neuropathy      Past Surgical History:   Procedure Laterality Date    APPENDECTOMY      HYSTERECTOMY      OOPHORECTOMY         Review of Systems:       No fever, sweats or chills,  No neck pain, back pain or joint pain  No loss of hearing or tinnitus  No blurry or double vision  No rashes or edema  No chest pain or palpitations  No shortness of breath or wheezing  No diarrhea or constipation  No urinary incontinence or dysuria  No seizures or syncope  No depression or anxiety    Medications On Admission  Medications Prior to Admission   Medication Sig Dispense Refill Last Dose    ARIPiprazole (ABILIFY) 10 MG tablet Take 1 tablet by mouth Daily.       benztropine (COGENTIN) 1 MG tablet Take 1 mg by mouth 2 (Two) Times a Day.       butalbital-acetaminophen-caffeine-codeine (FIORICET WITH CODEINE) -66-30 MG per capsule TAKE (1) CAPSULE EVERY SIX HOURS AS NEEDED.       butalbital-aspirin-caffeine-codeine (FIORINAL WITH CODEINE) -11-30 MG capsule Take 1 capsule by mouth Every 4 (Four) Hours As Needed for Headache.       carisoprodol (SOMA) 350 MG tablet Take 350 mg by mouth 2 (two) times a day.       clonazePAM (KlonoPIN) 0.5 MG tablet Take 0.5 mg by mouth every night at bedtime.       Estrogens Conjugated (PREMARIN PO) Take  by mouth Daily.       fenofibrate 160 MG tablet Take 1 tablet by mouth Daily.       gabapentin (NEURONTIN) 800 MG tablet Take 800 mg by mouth 3 (Three) Times a Day.       levothyroxine (SYNTHROID, LEVOTHROID) 88 MCG tablet TAKE 1 TABLET IN THE MORNING ON AN EMPTY STOMACH       LEVOTHYROXINE SODIUM PO Take  by mouth Daily.       lisinopril (PRINIVIL,ZESTRIL) 5 MG tablet Take 5 mg by mouth Daily.       LORazepam (ATIVAN) 1 MG tablet TAKE 1 TABLET BY MOUTH ONCE DAILY AS NEEDED FOR PANIC ATTACKS       meloxicam (MOBIC) 15 MG tablet TAKE 1 TABLET BY MOUTH DAILY. 30 tablet 0      "oxyCODONE-acetaminophen (PERCOCET)  MG per tablet Take 1 tablet by mouth Every 6 (Six) Hours As Needed.       pravastatin (PRAVACHOL) 20 MG tablet Take 1 tablet by mouth Daily.       rOPINIRole (REQUIP) 2 MG tablet Take 1 tablet by mouth Daily.       sertraline (ZOLOFT) 25 MG tablet Take 25 mg by mouth Daily.       Vraylar 3 MG capsule capsule Take 3 mg by mouth Daily.       zolpidem CR (AMBIEN CR) 12.5 MG CR tablet Take 12.5 mg by mouth At Night As Needed.          Allergies:  Allergies   Allergen Reactions    Morphine Hives       Social Hx:  Social History     Socioeconomic History    Marital status: Single   Tobacco Use    Smoking status: Every Day     Packs/day: 1     Types: Cigarettes    Smokeless tobacco: Never   Substance and Sexual Activity    Alcohol use: No    Drug use: No    Sexual activity: Defer       Family Hx:  Family History   Problem Relation Age of Onset    Cancer Mother     Diabetes Mother     Cancer Father        Review of Imaging (Interpretation of images not reports):  HCT: unremarkable    Laboratory Results:   Lab Results   Component Value Date    GLUCOSE 120 (H) 12/09/2023    CALCIUM 9.3 12/09/2023     (L) 12/09/2023    K 2.3 (C) 12/09/2023    CO2 26.0 12/09/2023    CL 86 (L) 12/09/2023    BUN 15 12/09/2023    CREATININE 0.80 12/09/2023    EGFRIFNONA 127 06/19/2019    BCR 18.8 12/09/2023    ANIONGAP 21.0 (H) 12/09/2023     Lab Results   Component Value Date    WBC 13.97 (H) 12/09/2023    HGB 14.9 12/09/2023    HCT 43.1 12/09/2023    MCV 96.0 12/09/2023     12/09/2023     No results found for: \"CHOL\"  No results found for: \"HDL\"  No results found for: \"LDL\"  No results found for: \"TRIG\"  No results found for: \"HGBA1C\"  Lab Results   Component Value Date    INR 0.82 (L) 12/09/2023    PROTIME 11.3 (L) 12/09/2023         Physical Examination:   /73   Pulse 72   Temp 100.3 °F (37.9 °C) (Axillary)   Resp 18   Ht 152.4 cm (60\")   Wt 59 kg (130 lb)   SpO2 97%   BMI " 25.39 kg/m²   Patient is drowsy but arouses with stimulation and is oriented to self and follows commands patient has no obvious tremor has some hyperreflexia in the bilateral lower extremities no spontaneous clonus no inducible clonus patient does have cranial nerves intact.  Overall patient is neurologically nonfocal.    Impression/Plan:   58 y.o. female with psychiatric conditions, polypharmacy and h/o childhood seiure presents with chief complaint of AMS and witnessed seizure.     Unclear if this was breakthrough seizure in the setting of underlying seizure disorder or triggered by illness or by medication overuse/underused and withdrawal.    -To be cautious will hold Abilify, Zoloft    -Currently, continue Requip, GBP    -schedule clonazepam 0.5 mg BID    -unintentional weight loss evaluation deferred to the primary team    -Patient will may have an underlying seizure disorder and this may represent a breakthrough seizure in the setting of possible illness versus medication withdrawal.      -However, to be cautious we will also continue to entertain other etiologies such as serotonin syndrome or less likely NMS.  Patient has extensive polypharmacy and from interviewing both the patient and both daughters the patient is fairly unreliable at this current time, it appears that patient has a tendency to overtake amounts of medications.      -Given her UDS is unremarkable however and concern for benzodiazepine withdrawal.      -Defer to the primary team for infectious evaluation.     -MRI brain w/wo when able/stable per primary team    -EEG 12/10    -f/u CK    -Seizure Precautions: Patient is not to drive for at least 3 months until cleared by a physician, operate heavy machinery, take tub baths, swim unattended, climb heights, or perform any other activities that can bring harm to himself/herself or others during an episode of altered awareness.             I have discussed the above with the patient and  family.  Time spent with patient: 60min    Coding     Akanksha Mckeon MD   Neurology

## 2023-12-09 NOTE — ED NOTES
Pt had witnessed seizure in CT by CT tech; paramedic/tech and charge RN to CT, MD aware of seizure activity;   Seizure lasted approx 20-30 seconds. VSS at this time

## 2023-12-09 NOTE — H&P
History and Physical    Patient:  Destiny Morelos  MRN: [id number]    CHIEF COMPLAINT: Seizure    History Obtained From: the patient   PCP: London Rascon MD    HISTORY OF PRESENT ILLNESS:   The patient is a 58 y.o. female who presents with acute onset of seizure.  No family at bedside and patient is mildly postictal.  States that she had seizures in childhood and her last seizure was 25 years ago.  Has not had a seizure medication prescribed in the last 10 years.  Follows with Dr. Carney for chronic headaches for which she is on chronic opioid therapy.  She also has significant psychiatric illnesses for which she takes atypical antipsychotic, benzodiazepines, and SSRI.  She denies any complaints of pain currently.  She is somewhat somnolent and difficult to get history from.  Per ER physician, the patient had multiple small seizures in the ER.  She was loaded with 2 g of Keppra admitted to ICU for further monitoring and neurologic evaluation.    REVIEW OF SYSTEMS:    Review of Systems   Unable to perform ROS: Mental status change          Past Medical History:  Past Medical History:   Diagnosis Date    Fibromyalgia     Hyperlipidemia     Hypertension     Hypothyroidism     Low back pain     Migraine     Neuropathy        Past Surgical History:  Past Surgical History:   Procedure Laterality Date    APPENDECTOMY      HYSTERECTOMY      OOPHORECTOMY         Medications Prior to Admission:    Medications Prior to Admission   Medication Sig Dispense Refill Last Dose    ARIPiprazole (ABILIFY) 10 MG tablet Take 1 tablet by mouth Daily.       benztropine (COGENTIN) 1 MG tablet Take 1 mg by mouth 2 (Two) Times a Day.       butalbital-acetaminophen-caffeine-codeine (FIORICET WITH CODEINE) -53-30 MG per capsule TAKE (1) CAPSULE EVERY SIX HOURS AS NEEDED.       butalbital-aspirin-caffeine-codeine (FIORINAL WITH CODEINE) -47-30 MG capsule Take 1 capsule by mouth Every 4 (Four) Hours As Needed for Headache.        "carisoprodol (SOMA) 350 MG tablet Take 350 mg by mouth 2 (two) times a day.       clonazePAM (KlonoPIN) 0.5 MG tablet Take 0.5 mg by mouth every night at bedtime.       Estrogens Conjugated (PREMARIN PO) Take  by mouth Daily.       fenofibrate 160 MG tablet Take 1 tablet by mouth Daily.       gabapentin (NEURONTIN) 800 MG tablet Take 800 mg by mouth 3 (Three) Times a Day.       levothyroxine (SYNTHROID, LEVOTHROID) 88 MCG tablet TAKE 1 TABLET IN THE MORNING ON AN EMPTY STOMACH       LEVOTHYROXINE SODIUM PO Take  by mouth Daily.       lisinopril (PRINIVIL,ZESTRIL) 5 MG tablet Take 5 mg by mouth Daily.       LORazepam (ATIVAN) 1 MG tablet TAKE 1 TABLET BY MOUTH ONCE DAILY AS NEEDED FOR PANIC ATTACKS       meloxicam (MOBIC) 15 MG tablet TAKE 1 TABLET BY MOUTH DAILY. 30 tablet 0     oxyCODONE-acetaminophen (PERCOCET)  MG per tablet Take 1 tablet by mouth Every 6 (Six) Hours As Needed.       pravastatin (PRAVACHOL) 20 MG tablet Take 1 tablet by mouth Daily.       rOPINIRole (REQUIP) 2 MG tablet Take 1 tablet by mouth Daily.       sertraline (ZOLOFT) 25 MG tablet Take 25 mg by mouth Daily.       Vraylar 3 MG capsule capsule Take 3 mg by mouth Daily.       zolpidem CR (AMBIEN CR) 12.5 MG CR tablet Take 12.5 mg by mouth At Night As Needed.          Allergies:  Morphine    Social History:   Social History     Socioeconomic History    Marital status: Single   Tobacco Use    Smoking status: Every Day     Packs/day: 1     Types: Cigarettes    Smokeless tobacco: Never   Substance and Sexual Activity    Alcohol use: No    Drug use: No    Sexual activity: Defer       Family History:   Family History   Problem Relation Age of Onset    Cancer Mother     Diabetes Mother     Cancer Father            Physical Exam:    Vitals: /81   Pulse 104   Temp 99.7 °F (37.6 °C) (Oral)   Resp 20   Ht 152.4 cm (60\")   Wt 59 kg (130 lb)   SpO2 100%   BMI 25.39 kg/m²   Physical Exam  Constitutional:       Appearance: She is " well-developed.      Comments: Somnolent but arousable   HENT:      Head: Normocephalic and atraumatic.   Eyes:      Conjunctiva/sclera: Conjunctivae normal.      Pupils: Pupils are equal, round, and reactive to light.   Cardiovascular:      Rate and Rhythm: Normal rate and regular rhythm.      Heart sounds: Normal heart sounds. No murmur heard.     No friction rub.   Pulmonary:      Effort: Pulmonary effort is normal. No respiratory distress.      Breath sounds: Normal breath sounds. No wheezing or rales.   Abdominal:      General: Bowel sounds are normal. There is no distension.      Palpations: Abdomen is soft.      Tenderness: There is no abdominal tenderness.   Musculoskeletal:         General: Normal range of motion.      Cervical back: Normal range of motion.   Skin:     Capillary Refill: Capillary refill takes less than 2 seconds.   Neurological:      Mental Status: She is oriented to person, place, and time.      Cranial Nerves: No cranial nerve deficit.   Psychiatric:         Behavior: Behavior normal.           Lab Results (last 24 hours)       Procedure Component Value Units Date/Time    Urine Drug Screen - Urine, Clean Catch [[id number]]  (Normal) Collected: 12/09/23 0903    Specimen: Urine, Clean Catch Updated: 12/09/23 0955     THC, Screen, Urine Negative     Phencyclidine (PCP), Urine Negative     Cocaine Screen, Urine Negative     Methamphetamine, Ur Negative     Opiate Screen Negative     Amphetamine Screen, Urine Negative     Benzodiazepine Screen, Urine Negative     Tricyclic Antidepressants Screen Negative     Methadone Screen, Urine Negative     Barbiturates Screen, Urine Negative     Oxycodone Screen, Urine Negative     Buprenorphine, Screen, Urine Negative    Narrative:      Cutoff For Drugs Screened:    Amphetamines               500 ng/ml  Barbiturates               200 ng/ml  Benzodiazepines            150 ng/ml  Cocaine                    150 ng/ml  Methadone                  200  ng/ml  Opiates                    100 ng/ml  Phencyclidine               25 ng/ml  THC                         50 ng/ml  Methamphetamine            500 ng/ml  Tricyclic Antidepressants  300 ng/ml  Oxycodone                  100 ng/ml  Buprenorphine               10 ng/ml    The normal value for all drugs tested is negative. This report includes unconfirmed screening results, with the cutoff values listed, to be used for medical treatment purposes only.  Unconfirmed results must not be used for non-medical purposes such as employment or legal testing.  Clinical consideration should be applied to any drug of abuse test, particularly when unconfirmed results are used.      Fentanyl, Urine - Urine, Clean Catch [[id number]]  (Normal) Collected: 12/09/23 0903    Specimen: Urine, Clean Catch Updated: 12/09/23 0946     Fentanyl, Urine Negative    Narrative:      Negative Threshold:      Fentanyl 5 ng/mL     The normal value for the drug tested is negative. This report includes final unconfirmed screening results to be used for medical treatment purposes only. Unconfirmed results must not be used for non-medical purposes such as employment or legal testing. Clinical consideration should be applied to any drug of abuse test, particularly when unconfirmed results are used.           Osmolality, Serum [[id number]]  (Normal) Collected: 12/09/23 0831    Specimen: Blood Updated: 12/09/23 0944     Osmolality 281 mOsm/kg     Comprehensive Metabolic Panel [[id number]]  (Abnormal) Collected: 12/09/23 0831    Specimen: Blood Updated: 12/09/23 0937     Glucose 120 mg/dL      BUN 15 mg/dL      Creatinine 0.80 mg/dL      Sodium 133 mmol/L      Potassium 2.3 mmol/L      Comment: Slight hemolysis detected by analyzer. Result may be falsely elevated.        Chloride 86 mmol/L      CO2 26.0 mmol/L      Calcium 9.3 mg/dL      Total Protein 7.6 g/dL      Albumin 4.5 g/dL      ALT (SGPT) 41 U/L      AST (SGOT) 40 U/L      Alkaline Phosphatase 116  "U/L      Total Bilirubin 0.6 mg/dL      Globulin 3.1 gm/dL      A/G Ratio 1.5 g/dL      BUN/Creatinine Ratio 18.8     Anion Gap 21.0 mmol/L      eGFR 86.1 mL/min/1.73     Narrative:      GFR Normal >60  Chronic Kidney Disease <60  Kidney Failure <15      Phosphorus [[id number]]  (Abnormal) Collected: 12/09/23 0831    Specimen: Blood Updated: 12/09/23 0937     Phosphorus 1.9 mg/dL     Lactic Acid, Plasma [[id number]]  (Abnormal) Collected: 12/09/23 0831    Specimen: Blood Updated: 12/09/23 0937     Lactate 8.7 mmol/L     Magnesium [[id number]]  (Normal) Collected: 12/09/23 0831    Specimen: Blood Updated: 12/09/23 0936     Magnesium 2.6 mg/dL     TSH [[id number]]  (Normal) Collected: 12/09/23 0831    Specimen: Blood Updated: 12/09/23 0933     TSH 1.260 uIU/mL     Procalcitonin [[id number]]  (Normal) Collected: 12/09/23 0831    Specimen: Blood Updated: 12/09/23 0932     Procalcitonin 0.08 ng/mL     Narrative:      As a Marker for Sepsis (Non-Neonates):    1. <0.5 ng/mL represents a low risk of severe sepsis and/or septic shock.  2. >2 ng/mL represents a high risk of severe sepsis and/or septic shock.    As a Marker for Lower Respiratory Tract Infections that require antibiotic therapy:    PCT on Admission    Antibiotic Therapy       6-12 Hrs later    >0.5                Strongly Recommended  >0.25 - <0.5        Recommended   0.1 - 0.25          Discouraged              Remeasure/reassess PCT  <0.1                Strongly Discouraged     Remeasure/reassess PCT    As 28 day mortality risk marker: \"Change in Procalcitonin Result\" (>80% or <=80%) if Day 0 (or Day 1) and Day 4 values are available. Refer to http://[email or web address]-pct-calculator.com    Change in PCT <=80%  A decrease of PCT levels below or equal to 80% defines a positive change in PCT test result representing a higher risk for 28-day all-cause mortality of patients diagnosed with severe sepsis for septic shock.    Change in PCT >80%  A decrease of PCT levels of more " than 80% defines a negative change in PCT result representing a lower risk for 28-day all-cause mortality of patients diagnosed with severe sepsis or septic shock.       Urinalysis, Microscopic Only - Urine, Catheter [[id number]]  (Abnormal) Collected: 12/09/23 0903    Specimen: Urine, Catheter Updated: 12/09/23 0931     RBC, UA None Seen /HPF      WBC, UA 0-2 /HPF      Comment: Urine culture not indicated.        Bacteria, UA Trace /HPF      Squamous Epithelial Cells, UA 3-6 /HPF      Hyaline Casts, UA 0-2 /LPF      Methodology Manual Light Microscopy    Salicylate Level [[id number]]  (Normal) Collected: 12/09/23 0831    Specimen: Blood Updated: 12/09/23 0927     Salicylate <0.3 mg/dL     C-reactive Protein [[id number]]  (Abnormal) Collected: 12/09/23 0831    Specimen: Blood Updated: 12/09/23 0927     C-Reactive Protein 4.96 mg/dL     Urinalysis With Culture If Indicated - Urine, Catheter [[id number]]  (Abnormal) Collected: 12/09/23 0903    Specimen: Urine, Catheter Updated: 12/09/23 0927     Color, UA Dark Yellow     Appearance, UA Clear     pH, UA 5.5     Specific Gravity, UA 1.021     Glucose, UA Negative     Ketones, UA 15 mg/dL (1+)     Bilirubin, UA Small (1+)     Blood, UA Trace     Protein, UA 30 mg/dL (1+)     Leuk Esterase, UA Trace     Nitrite, UA Negative     Urobilinogen, UA 1.0 E.U./dL    Narrative:      In absence of clinical symptoms, the presence of pyuria, bacteria, and/or nitrites on the urinalysis result does not correlate with infection.    Acetaminophen Level [[id number]]  (Normal) Collected: 12/09/23 0831    Specimen: Blood Updated: 12/09/23 0926     Acetaminophen <5.0 mcg/mL     Ammonia [[id number]]  (Normal) Collected: 12/09/23 0831    Specimen: Blood Updated: 12/09/23 0923     Ammonia 47 umol/L     Ethanol [[id number]] Collected: 12/09/23 0831    Specimen: Blood Updated: 12/09/23 0922     Ethanol % <0.010 %     Narrative:      Not for legal purposes. Chain of Custody not followed.     Protime-INR  [[id number]]  (Abnormal) Collected: 12/09/23 0831    Specimen: Blood Updated: 12/09/23 0854     Protime 11.3 Seconds      INR 0.82    CBC & Differential [[id number]]  (Abnormal) Collected: 12/09/23 0831    Specimen: Blood Updated: 12/09/23 0843    Narrative:      The following orders were created for panel order CBC & Differential.  Procedure                               Abnormality         Status                     ---------                               -----------         ------                     CBC Auto Differential[[id number]]        Abnormal            Final result                 Please view results for these tests on the individual orders.    CBC Auto Differential [[id number]]  (Abnormal) Collected: 12/09/23 0831    Specimen: Blood Updated: 12/09/23 0843     WBC 13.97 10*3/mm3      RBC 4.49 10*6/mm3      Hemoglobin 14.9 g/dL      Hematocrit 43.1 %      MCV 96.0 fL      MCH 33.2 pg      MCHC 34.6 g/dL      RDW 12.3 %      RDW-SD 43.6 fl      MPV 10.2 fL      Platelets 355 10*3/mm3      Neutrophil % 82.3 %      Lymphocyte % 9.7 %      Monocyte % 7.1 %      Eosinophil % 0.1 %      Basophil % 0.2 %      Immature Grans % 0.6 %      Neutrophils, Absolute 11.50 10*3/mm3      Lymphocytes, Absolute 1.36 10*3/mm3      Monocytes, Absolute 0.99 10*3/mm3      Eosinophils, Absolute 0.01 10*3/mm3      Basophils, Absolute 0.03 10*3/mm3      Immature Grans, Absolute 0.08 10*3/mm3      nRBC 0.0 /100 WBC     Blood Gas, Arterial - [[id number]]  (Abnormal) Collected: 12/09/23 0837    Specimen: Arterial Blood Updated: 12/09/23 0835     Site Right Radial     Kevin's Test Positive     pH, Arterial 7.472 pH units      Comment: 83 Value above reference range        pCO2, Arterial 42.1 mm Hg      pO2, Arterial 62.0 mm Hg      Comment: 84 Value below reference range        HCO3, Arterial 30.8 mmol/L      Comment: 83 Value above reference range        Base Excess, Arterial 6.4 mmol/L      Comment: 83 Value above reference range         O2 Saturation, Arterial 93.9 %      Comment: 84 Value below reference range        Temperature 37.0     Barometric Pressure for Blood Gas 747 mmHg      Modality Nasal Cannula     Flow Rate 2.0 lpm      Ventilator Mode NA     Collected by [id number]     Comment: Meter: [id number]     :  [id number]        pCO2, Temperature Corrected 42.1 mm Hg      pH, Temp Corrected 7.472 pH Units      pO2, Temperature Corrected 62.0 mm Hg              -----------------------------------------------------------------    Radiology:     XR Chest 1 View    Result Date: 12/9/2023  EXAM: XR CHEST 1 VW- 12/9/2023 8:26 AM  HISTORY: tachypnea   COMPARISON: 6/19/2019.  TECHNIQUE: Single frontal radiograph of the chest was obtained.  FINDINGS:  Support Devices: None.  Cardiac and Mediastinal Silhouettes: Normal.  Lungs/Pleura: No focal consolidation. No sizable pleural effusion. No visible pneumothorax.  Osseous structures: No acute osseous finding.  Other: None.       No acute cardiopulmonary abnormality.    This report was signed and finalized on 12/9/2023 9:58 AM by Shorty Ricci.      CT Head Without Contrast    Result Date: 12/9/2023  Exam: CT HEAD WO CONTRAST- 12/9/2023 9:06 AM  HISTORY: Mental status change, unknown cause   DOSE LENGTH PRODUCT: 847 mGy cm. Automated exposure control was also utilized to decrease patient radiation dose.  Technique: Helically acquired CT of the brain without IV contrast was performed. Sagittal and coronal reformations are also provided for review. Soft tissue and bone kernels are available for interpretation.  Comparison: MRI brain 9/18/2009.  Findings:  Ventricles and extra-axial CSF spaces are normal in size.  No intraparenchymal or extra-axial hemorrhage.  Gray-white matter differentiation is preserved.  Orbits are grossly unremarkable. Paranasal sinuses are grossly clear. Mastoid air cells are grossly clear.  No suspicious calvarial or extracranial soft tissue abnormality.  Other:None.       Impression:   No acute intracranial abnormality.  This report was signed and finalized on 12/9/2023 9:34 AM by Shorty Ricci.        Assessment and Plan   Seizure  Suspect recurrence of her underlying seizure disorder.  Loaded with Keppra in the ER.  Neurology consult appreciate recommendations.  Anticipate that she will get an EEG and need to be on antiepileptic therapy going forward.  Will monitor in the ICU for the next 24 hours for recurrence.    Lactic acidosis  Likely secondary to multiple tonic-clonic seizures.  Receiving IV fluids.  Trend lactate.  No evidence of infection.  Procalcitonin negative.    Hypothyroidism  TSH at goal, continue home Synthroid    Major depression  Resume home medications.    Chronic opioid use  Written by Dr. Carney for chronic headaches.  Will reorder home medication but need to use it cautiously with her sedation postictally currently.    Disposition: Inpatient, critical care    CODE STATUS: Full    DVT prophylaxis: Lovenox    Critical care time: 41 minutes  Time spent acutely managing and stabilizing patient, obtaining history from outside sources including ER physician, reviewing medications and chart review for history as history from patient was limited.      Seizure    Chronic pain    Migraine without status migrainosus, not intractable    Major depression, recurrent    Hypothyroidism (acquired)    Lactic acidosis    Chronic, continuous use of opioids      Amol Molina MD 12/9/2023 12:20 CST

## 2023-12-10 ENCOUNTER — APPOINTMENT (OUTPATIENT)
Dept: NEUROLOGY | Facility: HOSPITAL | Age: 58
End: 2023-12-10
Payer: MEDICARE

## 2023-12-10 ENCOUNTER — APPOINTMENT (OUTPATIENT)
Dept: MRI IMAGING | Facility: HOSPITAL | Age: 58
End: 2023-12-10
Payer: MEDICARE

## 2023-12-10 LAB
ALBUMIN SERPL-MCNC: 3.3 G/DL (ref 3.5–5.2)
ALBUMIN/GLOB SERPL: 1.4 G/DL
ALP SERPL-CCNC: 81 U/L (ref 39–117)
ALT SERPL W P-5'-P-CCNC: 28 U/L (ref 1–33)
ANION GAP SERPL CALCULATED.3IONS-SCNC: 10 MMOL/L (ref 5–15)
ANION GAP SERPL CALCULATED.3IONS-SCNC: 9 MMOL/L (ref 5–15)
AST SERPL-CCNC: 33 U/L (ref 1–32)
BASOPHILS # BLD AUTO: 0.04 10*3/MM3 (ref 0–0.2)
BASOPHILS NFR BLD AUTO: 0.4 % (ref 0–1.5)
BILIRUB SERPL-MCNC: 0.5 MG/DL (ref 0–1.2)
BUN SERPL-MCNC: 10 MG/DL (ref 6–20)
BUN SERPL-MCNC: 11 MG/DL (ref 6–20)
BUN/CREAT SERPL: 26.3 (ref 7–25)
BUN/CREAT SERPL: 32.4 (ref 7–25)
CALCIUM SPEC-SCNC: 7.9 MG/DL (ref 8.6–10.5)
CALCIUM SPEC-SCNC: 8 MG/DL (ref 8.6–10.5)
CHLORIDE SERPL-SCNC: 99 MMOL/L (ref 98–107)
CHLORIDE SERPL-SCNC: 99 MMOL/L (ref 98–107)
CK SERPL-CCNC: 803 U/L (ref 20–180)
CO2 SERPL-SCNC: 27 MMOL/L (ref 22–29)
CO2 SERPL-SCNC: 27 MMOL/L (ref 22–29)
CREAT SERPL-MCNC: 0.34 MG/DL (ref 0.57–1)
CREAT SERPL-MCNC: 0.38 MG/DL (ref 0.57–1)
DEPRECATED RDW RBC AUTO: 45.3 FL (ref 37–54)
EGFRCR SERPLBLD CKD-EPI 2021: 117 ML/MIN/1.73
EGFRCR SERPLBLD CKD-EPI 2021: 120.2 ML/MIN/1.73
EOSINOPHIL # BLD AUTO: 0.09 10*3/MM3 (ref 0–0.4)
EOSINOPHIL NFR BLD AUTO: 0.9 % (ref 0.3–6.2)
ERYTHROCYTE [DISTWIDTH] IN BLOOD BY AUTOMATED COUNT: 12.4 % (ref 12.3–15.4)
GLOBULIN UR ELPH-MCNC: 2.3 GM/DL
GLUCOSE SERPL-MCNC: 82 MG/DL (ref 65–99)
GLUCOSE SERPL-MCNC: 91 MG/DL (ref 65–99)
HCT VFR BLD AUTO: 37.4 % (ref 34–46.6)
HGB BLD-MCNC: 12.4 G/DL (ref 12–15.9)
IMM GRANULOCYTES # BLD AUTO: 0.04 10*3/MM3 (ref 0–0.05)
IMM GRANULOCYTES NFR BLD AUTO: 0.4 % (ref 0–0.5)
LYMPHOCYTES # BLD AUTO: 3.71 10*3/MM3 (ref 0.7–3.1)
LYMPHOCYTES NFR BLD AUTO: 38.3 % (ref 19.6–45.3)
MCH RBC QN AUTO: 32.9 PG (ref 26.6–33)
MCHC RBC AUTO-ENTMCNC: 33.2 G/DL (ref 31.5–35.7)
MCV RBC AUTO: 99.2 FL (ref 79–97)
MONOCYTES # BLD AUTO: 0.8 10*3/MM3 (ref 0.1–0.9)
MONOCYTES NFR BLD AUTO: 8.3 % (ref 5–12)
NEUTROPHILS NFR BLD AUTO: 5 10*3/MM3 (ref 1.7–7)
NEUTROPHILS NFR BLD AUTO: 51.7 % (ref 42.7–76)
NRBC BLD AUTO-RTO: 0 /100 WBC (ref 0–0.2)
PLATELET # BLD AUTO: 256 10*3/MM3 (ref 140–450)
PMV BLD AUTO: 9.8 FL (ref 6–12)
POTASSIUM SERPL-SCNC: 3.3 MMOL/L (ref 3.5–5.2)
POTASSIUM SERPL-SCNC: 3.7 MMOL/L (ref 3.5–5.2)
POTASSIUM SERPL-SCNC: 3.9 MMOL/L (ref 3.5–5.2)
PROT SERPL-MCNC: 5.6 G/DL (ref 6–8.5)
RBC # BLD AUTO: 3.77 10*6/MM3 (ref 3.77–5.28)
SODIUM SERPL-SCNC: 135 MMOL/L (ref 136–145)
SODIUM SERPL-SCNC: 136 MMOL/L (ref 136–145)
WBC NRBC COR # BLD AUTO: 9.68 10*3/MM3 (ref 3.4–10.8)

## 2023-12-10 PROCEDURE — 95816 EEG AWAKE AND DROWSY: CPT

## 2023-12-10 PROCEDURE — 84132 ASSAY OF SERUM POTASSIUM: CPT | Performed by: FAMILY MEDICINE

## 2023-12-10 PROCEDURE — 82550 ASSAY OF CK (CPK): CPT | Performed by: PSYCHIATRY & NEUROLOGY

## 2023-12-10 PROCEDURE — 70553 MRI BRAIN STEM W/O & W/DYE: CPT

## 2023-12-10 PROCEDURE — 25010000002 LEVETIRACETAM IN NACL 0.82% 500 MG/100ML SOLUTION: Performed by: PSYCHIATRY & NEUROLOGY

## 2023-12-10 PROCEDURE — 85025 COMPLETE CBC W/AUTO DIFF WBC: CPT | Performed by: FAMILY MEDICINE

## 2023-12-10 PROCEDURE — 80053 COMPREHEN METABOLIC PANEL: CPT | Performed by: FAMILY MEDICINE

## 2023-12-10 PROCEDURE — A9577 INJ MULTIHANCE: HCPCS | Performed by: FAMILY MEDICINE

## 2023-12-10 PROCEDURE — 99232 SBSQ HOSP IP/OBS MODERATE 35: CPT | Performed by: PSYCHIATRY & NEUROLOGY

## 2023-12-10 PROCEDURE — 25010000002 ENOXAPARIN PER 10 MG: Performed by: FAMILY MEDICINE

## 2023-12-10 PROCEDURE — 95816 EEG AWAKE AND DROWSY: CPT | Performed by: PSYCHIATRY & NEUROLOGY

## 2023-12-10 PROCEDURE — 0 GADOBENATE DIMEGLUMINE 529 MG/ML SOLUTION: Performed by: FAMILY MEDICINE

## 2023-12-10 RX ORDER — CODEINE/BUTALBITAL/ASA/CAFFEIN 30-50-325
1 CAPSULE ORAL EVERY 6 HOURS PRN
COMMUNITY
End: 2023-12-12 | Stop reason: HOSPADM

## 2023-12-10 RX ORDER — CARISOPRODOL 350 MG/1
350 TABLET ORAL 3 TIMES DAILY PRN
COMMUNITY
End: 2023-12-12 | Stop reason: HOSPADM

## 2023-12-10 RX ORDER — CLONAZEPAM 0.5 MG/1
0.5 TABLET ORAL 3 TIMES DAILY
COMMUNITY
End: 2023-12-12 | Stop reason: HOSPADM

## 2023-12-10 RX ORDER — ROPINIROLE 4 MG/1
4 TABLET, FILM COATED ORAL NIGHTLY
COMMUNITY

## 2023-12-10 RX ADMIN — GABAPENTIN 600 MG: 300 CAPSULE ORAL at 21:14

## 2023-12-10 RX ADMIN — ACETAMINOPHEN 650 MG: 325 TABLET, FILM COATED ORAL at 11:21

## 2023-12-10 RX ADMIN — Medication 10 ML: at 20:10

## 2023-12-10 RX ADMIN — ROPINIROLE HYDROCHLORIDE 2 MG: 1 TABLET, FILM COATED ORAL at 20:08

## 2023-12-10 RX ADMIN — LEVETIRACETAM 500 MG: 5 INJECTION INTRAVASCULAR at 20:10

## 2023-12-10 RX ADMIN — GADOBENATE DIMEGLUMINE 20 ML: 529 INJECTION, SOLUTION INTRAVENOUS at 14:35

## 2023-12-10 RX ADMIN — ENOXAPARIN SODIUM 40 MG: 100 INJECTION SUBCUTANEOUS at 15:23

## 2023-12-10 RX ADMIN — PRAVASTATIN SODIUM 20 MG: 20 TABLET ORAL at 15:23

## 2023-12-10 RX ADMIN — Medication 1 APPLICATION: at 09:07

## 2023-12-10 RX ADMIN — OXYCODONE HYDROCHLORIDE AND ACETAMINOPHEN 1 TABLET: 10; 325 TABLET ORAL at 20:08

## 2023-12-10 RX ADMIN — LEVETIRACETAM 500 MG: 5 INJECTION INTRAVASCULAR at 09:07

## 2023-12-10 RX ADMIN — POTASSIUM CHLORIDE 40 MEQ: 20 SOLUTION ORAL at 04:09

## 2023-12-10 RX ADMIN — GABAPENTIN 600 MG: 300 CAPSULE ORAL at 05:35

## 2023-12-10 RX ADMIN — CHLORHEXIDINE GLUCONATE 1 APPLICATION: 500 CLOTH TOPICAL at 05:00

## 2023-12-10 RX ADMIN — LISINOPRIL 5 MG: 5 TABLET ORAL at 10:08

## 2023-12-10 RX ADMIN — LEVOTHYROXINE SODIUM 88 MCG: 88 TABLET ORAL at 05:35

## 2023-12-10 RX ADMIN — CLONAZEPAM 0.5 MG: 0.5 TABLET ORAL at 09:07

## 2023-12-10 RX ADMIN — Medication 10 ML: at 09:08

## 2023-12-10 RX ADMIN — CLONAZEPAM 0.5 MG: 0.5 TABLET ORAL at 20:08

## 2023-12-10 NOTE — NURSING NOTE
Pt very drowsy and has slept the majority of the night. Aox2. VSS. Potassium replaced, waiting on lab collect. No complaints from pt overnight. Pt has yet to void but refused In/out catheter.

## 2023-12-10 NOTE — PROGRESS NOTES
NEUROLOGY FOLLOW-UP     DOS: 12/10/2023  NAME: Destiny Morelos   : 1965  PCP: London Rascon MD  CC: Stroke  Referring MD: No ref. provider found    Neurological Problem and Interval History:  58 y.o. female with psychiatric conditions, polypharmacy and h/o childhood seiure presents with chief complaint of AMS and witnessed seizure. Patient has a history of childhood seizures as well as a family history of seizures. She has polypharmacy and appeared to be feeling unwell x1 week with GI symptoms and told her daughter she was out of her medications. Unclear if she was unwell and then ran out of certain meds or if her symptoms were from withdrawal. She had a witnessed seizure and was loaded with keppra. She was drowsy and post-ictal but neurologically non-focal. Daughter at bedside notes that she has had a lot of unintentional weight loss. Daughter also notes that she has a history of taking her medications incorrectly or too much.Patient was found to have elevated LFTs, low potassium, elevated  lactate, and leukocytosis with temperature 100.3 (has received tylenol). She ahs had recent exposure to strep throat. She is not on AEDs.  Patient will may have an underlying seizure disorder and this may represent a breakthrough seizure in the setting of possible illness versus medication withdrawal.  However, to be cautious we will also continue to entertain other etiologies such as serotonin syndrome or less likely NMS.  Patient has extensive polypharmacy and from interviewing both the patient and both daughters the patient is fairly unreliable at this current time, it appears that patient has a tendency to overtake amounts of medications.  Given her UDS is unremarkable however and concern for benzodiazepine withdrawal.  Defer to the primary team for further investigation given her recent weight loss as well as infectious evaluation.    12/10: OK to resume home medications, continue keppra, await EEG, MRI is  unremarkable. May have underlying seizure disorder given history of childhood seizures with breakthrough in the setting of infection vs infection and seizures due to withdrawal of home medications (benzos and barbiturates).      Medications On Admission  Medications Prior to Admission   Medication Sig Dispense Refill Last Dose    clonazePAM (KlonoPIN) 0.5 MG tablet Take 1 tablet by mouth 3 (Three) Times a Day.       gabapentin (NEURONTIN) 800 MG tablet Take 1 tablet by mouth 3 (Three) Times a Day.       levothyroxine (SYNTHROID, LEVOTHROID) 88 MCG tablet Take 1 tablet by mouth Daily.       rOPINIRole (REQUIP) 4 MG tablet Take 1 tablet by mouth Every Night.       ARIPiprazole (ABILIFY) 10 MG tablet Take 1 tablet by mouth Daily.       butalbital-aspirin-caffeine-codeine (FIORINAL WITH CODEINE) -07-30 MG capsule Take 1 capsule by mouth Every 6 (Six) Hours As Needed for Headache.       carisoprodol (SOMA) 350 MG tablet Take 1 tablet by mouth 3 (Three) Times a Day As Needed for Muscle Spasms.       lisinopril (PRINIVIL,ZESTRIL) 5 MG tablet Take 1 tablet by mouth Daily.       oxyCODONE-acetaminophen (PERCOCET)  MG per tablet Take 1 tablet by mouth Every 6 (Six) Hours As Needed.       pravastatin (PRAVACHOL) 20 MG tablet Take 1 tablet by mouth Daily.       Vraylar 3 MG capsule capsule Take 1 capsule by mouth Daily.       zolpidem CR (AMBIEN CR) 12.5 MG CR tablet Take 1 tablet by mouth At Night As Needed.            Laboratory Results:   Lab Results   Component Value Date    GLUCOSE 82 12/10/2023    CALCIUM 8.0 (L) 12/10/2023     12/10/2023    K 3.7 12/10/2023    CO2 27.0 12/10/2023    CL 99 12/10/2023    BUN 10 12/10/2023    CREATININE 0.38 (L) 12/10/2023    EGFRIFNONA 127 06/19/2019    BCR 26.3 (H) 12/10/2023    ANIONGAP 10.0 12/10/2023     Lab Results   Component Value Date    WBC 9.68 12/10/2023    HGB 12.4 12/10/2023    HCT 37.4 12/10/2023    MCV 99.2 (H) 12/10/2023     12/10/2023     No  "results found for: \"CHOL\"  No results found for: \"HDL\"  No results found for: \"LDL\"  No results found for: \"TRIG\"  No results found for: \"HGBA1C\"  Lab Results   Component Value Date    INR 0.82 (L) 12/09/2023    PROTIME 11.3 (L) 12/09/2023         Physical Examination:   BP 92/66   Pulse 70   Temp 98.2 °F (36.8 °C) (Oral)   Resp 20   Ht 152.4 cm (60\")   Wt 59 kg (130 lb)   SpO2 93%   BMI 25.39 kg/m²     Patient is drowsy but arouses with stimulation and is oriented to self and follows commands patient has no obvious tremor has some hyperreflexia in the bilateral lower extremities no spontaneous clonus no inducible clonus patient does have cranial nerves intact.  Overall patient is neurologically nonfocal.     Impression/Plan:   58 y.o. female with psychiatric conditions, polypharmacy and h/o childhood seiure presents with chief complaint of AMS and witnessed seizure.      Unclear if this was breakthrough seizure in the setting of underlying seizure disorder or triggered by illness or by medication overuse/underused and withdrawal.    -12/10: OK to resume home medications, continue keppra, await EEG, MRI is unremarkable. May have underlying seizure disorder given history of childhood seizures with breakthrough in the setting of infection vs infection and seizures due to withdrawal of home medications (benzos and barbiturates).       -OK to restart Abilify, Zoloft     -Currently, continue Requip, GBP     -schedule clonazepam 0.5 mg BID     -unintentional weight loss evaluation deferred to the primary team, likely an outpatient evaluation by her PCP.     -Patient will may have an underlying seizure disorder and this may represent a breakthrough seizure in the setting of possible illness versus medication withdrawal.       -Given her UDS is unremarkable however and concern for benzodiazepine/barbiturate withdrawal.       -COVID     -MRI brain w/wo unremarkable     -EEG      -repeat CK     -Seizure Precautions: " Patient is not to drive for at least 3 months until cleared by a physician, operate heavy machinery, take tub baths, swim unattended, climb heights, or perform any other activities that can bring harm to himself/herself or others during an episode of altered awareness.    I have discussed the above with the patient   Time spent with patient: 30min    MDM  Reviewed: previous chart, nursing note and vitals  Reviewed previous: labs, MRI and CT scan  Interpretation: labs, MRI and CT scan  Total time providing critical care: 30-74 minutes. This excludes time spent performing separately reportable procedures and services.  Consults: neurology         Akanksha Mckeon MD   Neurology

## 2023-12-10 NOTE — PROGRESS NOTES
Daily Progress Note  Destiny Morelos  MRN: [id number] LOS: 1    Admit Date: 12/9/2023   12/10/2023 09:02 CST    Subjective:          Chief Complaint:  Chief Complaint   Patient presents with    Altered Mental Status       Interval History:    Reviewed overnight events and nursing notes.   Patient resting company bed 20.  No further seizures overnight.    Review of Systems   Constitutional:  Negative for chills and fever.   Respiratory:  Negative for shortness of breath.    Neurological:  Negative for seizures.       DIET:  Diet: Regular/House Diet; Texture: Regular Texture (IDDSI 7); Fluid Consistency: Thin (IDDSI 0)    Medications:      [Held by provider] ARIPiprazole, 10 mg, Oral, Q24H  Chlorhexidine Gluconate Cloth, 1 application , Topical, Once  Chlorhexidine Gluconate Cloth, 1 application , Topical, Q24H  clonazePAM, 0.5 mg, Oral, Q12H  enoxaparin, 40 mg, Subcutaneous, Q24H  gabapentin, 600 mg, Oral, Q8H  levETIRAcetam, 500 mg, Intravenous, Q12H  levothyroxine, 88 mcg, Oral, Q AM  lisinopril, 5 mg, Oral, Daily  mupirocin, 1 application , Each Nare, BID  pravastatin, 20 mg, Oral, Q24H  rOPINIRole, 2 mg, Oral, Nightly  senna-docusate sodium, 2 tablet, Oral, BID  [Held by provider] sertraline, 25 mg, Oral, Daily  sodium chloride, 10 mL, Intravenous, Q12H        Data:     Code Status:   Code Status and Medical Interventions:   Ordered at: 12/09/23 1217     Code Status (Patient has no pulse and is not breathing):    CPR (Attempt to Resuscitate)     Medical Interventions (Patient has pulse or is breathing):    Full Support       Family History   Problem Relation Age of Onset    Cancer Mother     Diabetes Mother     Cancer Father      Social History     Socioeconomic History    Marital status: Single   Tobacco Use    Smoking status: Every Day     Packs/day: 1     Types: Cigarettes    Smokeless tobacco: Never   Vaping Use    Vaping Use: Never used   Substance and Sexual Activity    Alcohol use: No    Drug use: No     Sexual activity: Defer       Labs:    Lab Results (last 72 hours)       Procedure Component Value Units Date/Time    Potassium [[id number]]  (Normal) Collected: 12/10/23 0756    Specimen: Blood Updated: 12/10/23 0844     Potassium 3.7 mmol/L     Comprehensive Metabolic Panel [[id number]]  (Abnormal) Collected: 12/10/23 0307    Specimen: Blood Updated: 12/10/23 0356     Glucose 82 mg/dL      BUN 10 mg/dL      Creatinine 0.38 mg/dL      Sodium 136 mmol/L      Potassium 3.3 mmol/L      Chloride 99 mmol/L      CO2 27.0 mmol/L      Calcium 8.0 mg/dL      Total Protein 5.6 g/dL      Albumin 3.3 g/dL      ALT (SGPT) 28 U/L      AST (SGOT) 33 U/L      Alkaline Phosphatase 81 U/L      Total Bilirubin 0.5 mg/dL      Globulin 2.3 gm/dL      A/G Ratio 1.4 g/dL      BUN/Creatinine Ratio 26.3     Anion Gap 10.0 mmol/L      eGFR 117.0 mL/min/1.73     Narrative:      GFR Normal >60  Chronic Kidney Disease <60  Kidney Failure <15      CBC Auto Differential [[id number]]  (Abnormal) Collected: 12/10/23 0307    Specimen: Blood Updated: 12/10/23 0332     WBC 9.68 10*3/mm3      RBC 3.77 10*6/mm3      Hemoglobin 12.4 g/dL      Hematocrit 37.4 %      MCV 99.2 fL      MCH 32.9 pg      MCHC 33.2 g/dL      RDW 12.4 %      RDW-SD 45.3 fl      MPV 9.8 fL      Platelets 256 10*3/mm3      Neutrophil % 51.7 %      Lymphocyte % 38.3 %      Monocyte % 8.3 %      Eosinophil % 0.9 %      Basophil % 0.4 %      Immature Grans % 0.4 %      Neutrophils, Absolute 5.00 10*3/mm3      Lymphocytes, Absolute 3.71 10*3/mm3      Monocytes, Absolute 0.80 10*3/mm3      Eosinophils, Absolute 0.09 10*3/mm3      Basophils, Absolute 0.04 10*3/mm3      Immature Grans, Absolute 0.04 10*3/mm3      nRBC 0.0 /100 WBC     Basic Metabolic Panel [[id number]]  (Abnormal) Collected: 12/10/23 0012    Specimen: Blood Updated: 12/10/23 0045     Glucose 91 mg/dL      BUN 11 mg/dL      Creatinine 0.34 mg/dL      Sodium 135 mmol/L      Potassium 3.9 mmol/L      Chloride 99 mmol/L      CO2  27.0 mmol/L      Calcium 7.9 mg/dL      BUN/Creatinine Ratio 32.4     Anion Gap 9.0 mmol/L      eGFR 120.2 mL/min/1.73     Narrative:      GFR Normal >60  Chronic Kidney Disease <60  Kidney Failure <15      CK [[id number]]  (Abnormal) Collected: 12/09/23 1641    Specimen: Blood Updated: 12/09/23 1803     Creatine Kinase 1,074 U/L     Basic Metabolic Panel [[id number]]  (Abnormal) Collected: 12/09/23 1641    Specimen: Blood Updated: 12/09/23 1740     Glucose 94 mg/dL      BUN 13 mg/dL      Creatinine 0.42 mg/dL      Sodium 136 mmol/L      Potassium 2.5 mmol/L      Chloride 96 mmol/L      CO2 28.0 mmol/L      Calcium 8.0 mg/dL      BUN/Creatinine Ratio 31.0     Anion Gap 12.0 mmol/L      eGFR 114.3 mL/min/1.73     Narrative:      GFR Normal >60  Chronic Kidney Disease <60  Kidney Failure <15      Phosphorus [[id number]]  (Normal) Collected: 12/09/23 1641    Specimen: Blood Updated: 12/09/23 1738     Phosphorus 2.6 mg/dL     STAT Lactic Acid, Reflex [[id number]]  (Normal) Collected: 12/09/23 1159    Specimen: Blood Updated: 12/09/23 1311     Lactate 1.9 mmol/L     Urine Drug Screen - Urine, Clean Catch [[id number]]  (Normal) Collected: 12/09/23 0903    Specimen: Urine, Clean Catch Updated: 12/09/23 0955     THC, Screen, Urine Negative     Phencyclidine (PCP), Urine Negative     Cocaine Screen, Urine Negative     Methamphetamine, Ur Negative     Opiate Screen Negative     Amphetamine Screen, Urine Negative     Benzodiazepine Screen, Urine Negative     Tricyclic Antidepressants Screen Negative     Methadone Screen, Urine Negative     Barbiturates Screen, Urine Negative     Oxycodone Screen, Urine Negative     Buprenorphine, Screen, Urine Negative    Narrative:      Cutoff For Drugs Screened:    Amphetamines               500 ng/ml  Barbiturates               200 ng/ml  Benzodiazepines            150 ng/ml  Cocaine                    150 ng/ml  Methadone                  200 ng/ml  Opiates                    100  ng/ml  Phencyclidine               25 ng/ml  THC                         50 ng/ml  Methamphetamine            500 ng/ml  Tricyclic Antidepressants  300 ng/ml  Oxycodone                  100 ng/ml  Buprenorphine               10 ng/ml    The normal value for all drugs tested is negative. This report includes unconfirmed screening results, with the cutoff values listed, to be used for medical treatment purposes only.  Unconfirmed results must not be used for non-medical purposes such as employment or legal testing.  Clinical consideration should be applied to any drug of abuse test, particularly when unconfirmed results are used.      Fentanyl, Urine - Urine, Clean Catch [[id number]]  (Normal) Collected: 12/09/23 0903    Specimen: Urine, Clean Catch Updated: 12/09/23 0946     Fentanyl, Urine Negative    Narrative:      Negative Threshold:      Fentanyl 5 ng/mL     The normal value for the drug tested is negative. This report includes final unconfirmed screening results to be used for medical treatment purposes only. Unconfirmed results must not be used for non-medical purposes such as employment or legal testing. Clinical consideration should be applied to any drug of abuse test, particularly when unconfirmed results are used.           Osmolality, Serum [[id number]]  (Normal) Collected: 12/09/23 0831    Specimen: Blood Updated: 12/09/23 0944     Osmolality 281 mOsm/kg     Comprehensive Metabolic Panel [[id number]]  (Abnormal) Collected: 12/09/23 0831    Specimen: Blood Updated: 12/09/23 0937     Glucose 120 mg/dL      BUN 15 mg/dL      Creatinine 0.80 mg/dL      Sodium 133 mmol/L      Potassium 2.3 mmol/L      Comment: Slight hemolysis detected by analyzer. Result may be falsely elevated.        Chloride 86 mmol/L      CO2 26.0 mmol/L      Calcium 9.3 mg/dL      Total Protein 7.6 g/dL      Albumin 4.5 g/dL      ALT (SGPT) 41 U/L      AST (SGOT) 40 U/L      Alkaline Phosphatase 116 U/L      Total Bilirubin 0.6 mg/dL       "Globulin 3.1 gm/dL      A/G Ratio 1.5 g/dL      BUN/Creatinine Ratio 18.8     Anion Gap 21.0 mmol/L      eGFR 86.1 mL/min/1.73     Narrative:      GFR Normal >60  Chronic Kidney Disease <60  Kidney Failure <15      Phosphorus [[id number]]  (Abnormal) Collected: 12/09/23 0831    Specimen: Blood Updated: 12/09/23 0937     Phosphorus 1.9 mg/dL     Lactic Acid, Plasma [[id number]]  (Abnormal) Collected: 12/09/23 0831    Specimen: Blood Updated: 12/09/23 0937     Lactate 8.7 mmol/L     Magnesium [[id number]]  (Normal) Collected: 12/09/23 0831    Specimen: Blood Updated: 12/09/23 0936     Magnesium 2.6 mg/dL     TSH [[id number]]  (Normal) Collected: 12/09/23 0831    Specimen: Blood Updated: 12/09/23 0933     TSH 1.260 uIU/mL     Procalcitonin [[id number]]  (Normal) Collected: 12/09/23 0831    Specimen: Blood Updated: 12/09/23 0932     Procalcitonin 0.08 ng/mL     Narrative:      As a Marker for Sepsis (Non-Neonates):    1. <0.5 ng/mL represents a low risk of severe sepsis and/or septic shock.  2. >2 ng/mL represents a high risk of severe sepsis and/or septic shock.    As a Marker for Lower Respiratory Tract Infections that require antibiotic therapy:    PCT on Admission    Antibiotic Therapy       6-12 Hrs later    >0.5                Strongly Recommended  >0.25 - <0.5        Recommended   0.1 - 0.25          Discouraged              Remeasure/reassess PCT  <0.1                Strongly Discouraged     Remeasure/reassess PCT    As 28 day mortality risk marker: \"Change in Procalcitonin Result\" (>80% or <=80%) if Day 0 (or Day 1) and Day 4 values are available. Refer to http://www.[facility]-pct-calculator.com    Change in PCT <=80%  A decrease of PCT levels below or equal to 80% defines a positive change in PCT test result representing a higher risk for 28-day all-cause mortality of patients diagnosed with severe sepsis for septic shock.    Change in PCT >80%  A decrease of PCT levels of more than 80% defines a negative change in " PCT result representing a lower risk for 28-day all-cause mortality of patients diagnosed with severe sepsis or septic shock.       Urinalysis, Microscopic Only - Urine, Catheter [[id number]]  (Abnormal) Collected: 12/09/23 0903    Specimen: Urine, Catheter Updated: 12/09/23 0931     RBC, UA None Seen /HPF      WBC, UA 0-2 /HPF      Comment: Urine culture not indicated.        Bacteria, UA Trace /HPF      Squamous Epithelial Cells, UA 3-6 /HPF      Hyaline Casts, UA 0-2 /LPF      Methodology Manual Light Microscopy    Salicylate Level [[id number]]  (Normal) Collected: 12/09/23 0831    Specimen: Blood Updated: 12/09/23 0927     Salicylate <0.3 mg/dL     C-reactive Protein [[id number]]  (Abnormal) Collected: 12/09/23 0831    Specimen: Blood Updated: 12/09/23 0927     C-Reactive Protein 4.96 mg/dL     Urinalysis With Culture If Indicated - Urine, Catheter [[id number]]  (Abnormal) Collected: 12/09/23 0903    Specimen: Urine, Catheter Updated: 12/09/23 0927     Color, UA Dark Yellow     Appearance, UA Clear     pH, UA 5.5     Specific Gravity, UA 1.021     Glucose, UA Negative     Ketones, UA 15 mg/dL (1+)     Bilirubin, UA Small (1+)     Blood, UA Trace     Protein, UA 30 mg/dL (1+)     Leuk Esterase, UA Trace     Nitrite, UA Negative     Urobilinogen, UA 1.0 E.U./dL    Narrative:      In absence of clinical symptoms, the presence of pyuria, bacteria, and/or nitrites on the urinalysis result does not correlate with infection.    Acetaminophen Level [[id number]]  (Normal) Collected: 12/09/23 0831    Specimen: Blood Updated: 12/09/23 0926     Acetaminophen <5.0 mcg/mL     Ammonia [[id number]]  (Normal) Collected: 12/09/23 0831    Specimen: Blood Updated: 12/09/23 0923     Ammonia 47 umol/L     Ethanol [[id number]] Collected: 12/09/23 0831    Specimen: Blood Updated: 12/09/23 0922     Ethanol % <0.010 %     Narrative:      Not for legal purposes. Chain of Custody not followed.     Protime-INR [[id number]]  (Abnormal) Collected:  12/09/23 0831    Specimen: Blood Updated: 12/09/23 0854     Protime 11.3 Seconds      INR 0.82    CBC & Differential [[id number]]  (Abnormal) Collected: 12/09/23 0831    Specimen: Blood Updated: 12/09/23 0843    Narrative:      The following orders were created for panel order CBC & Differential.  Procedure                               Abnormality         Status                     ---------                               -----------         ------                     CBC Auto Differential[[id number]]        Abnormal            Final result                 Please view results for these tests on the individual orders.    CBC Auto Differential [[id number]]  (Abnormal) Collected: 12/09/23 0831    Specimen: Blood Updated: 12/09/23 0843     WBC 13.97 10*3/mm3      RBC 4.49 10*6/mm3      Hemoglobin 14.9 g/dL      Hematocrit 43.1 %      MCV 96.0 fL      MCH 33.2 pg      MCHC 34.6 g/dL      RDW 12.3 %      RDW-SD 43.6 fl      MPV 10.2 fL      Platelets 355 10*3/mm3      Neutrophil % 82.3 %      Lymphocyte % 9.7 %      Monocyte % 7.1 %      Eosinophil % 0.1 %      Basophil % 0.2 %      Immature Grans % 0.6 %      Neutrophils, Absolute 11.50 10*3/mm3      Lymphocytes, Absolute 1.36 10*3/mm3      Monocytes, Absolute 0.99 10*3/mm3      Eosinophils, Absolute 0.01 10*3/mm3      Basophils, Absolute 0.03 10*3/mm3      Immature Grans, Absolute 0.08 10*3/mm3      nRBC 0.0 /100 WBC     Blood Gas, Arterial - [[id number]]  (Abnormal) Collected: 12/09/23 0837    Specimen: Arterial Blood Updated: 12/09/23 0835     Site Right Radial     Kevin's Test Positive     pH, Arterial 7.472 pH units      Comment: 83 Value above reference range        pCO2, Arterial 42.1 mm Hg      pO2, Arterial 62.0 mm Hg      Comment: 84 Value below reference range        HCO3, Arterial 30.8 mmol/L      Comment: 83 Value above reference range        Base Excess, Arterial 6.4 mmol/L      Comment: 83 Value above reference range        O2 Saturation, Arterial 93.9 %       "Comment: 84 Value below reference range        Temperature 37.0     Barometric Pressure for Blood Gas 747 mmHg      Modality Nasal Cannula     Flow Rate 2.0 lpm      Ventilator Mode NA     Collected by [id number]     Comment: Meter: [id number]     :  [id number]        pCO2, Temperature Corrected 42.1 mm Hg      pH, Temp Corrected 7.472 pH Units      pO2, Temperature Corrected 62.0 mm Hg               Objective:     Vitals: /79   Pulse (!) 49   Temp 97.8 °F (36.6 °C) (Axillary)   Resp 18   Ht 152.4 cm (60\")   Wt 59 kg (130 lb)   SpO2 96%   BMI 25.39 kg/m²    Intake/Output Summary (Last 24 hours) at 12/10/2023 09  Last data filed at 2023 0955  Gross per 24 hour   Intake 200 ml   Output --   Net 200 ml    Temp (24hrs), Av °F (37.2 °C), Min:97.6 °F (36.4 °C), Max:100.3 °F (37.9 °C)      Physical Exam  Constitutional:       Appearance: She is well-developed.   HENT:      Head: Normocephalic and atraumatic.   Eyes:      Conjunctiva/sclera: Conjunctivae normal.      Pupils: Pupils are equal, round, and reactive to light.   Cardiovascular:      Rate and Rhythm: Normal rate and regular rhythm.      Heart sounds: Normal heart sounds. No murmur heard.     No friction rub.   Pulmonary:      Effort: Pulmonary effort is normal. No respiratory distress.      Breath sounds: Normal breath sounds. No wheezing or rales.   Abdominal:      General: Bowel sounds are normal. There is no distension.      Palpations: Abdomen is soft.      Tenderness: There is no abdominal tenderness.   Musculoskeletal:         General: Normal range of motion.      Cervical back: Normal range of motion.   Skin:     Capillary Refill: Capillary refill takes less than 2 seconds.   Neurological:      Mental Status: She is alert and oriented to person, place, and time.      Cranial Nerves: No cranial nerve deficit.   Psychiatric:         Behavior: Behavior normal.             Assessment and Plan:     Primary " Problem:  Seizure    Hospital Problem list:    Seizure    Chronic pain    Migraine without status migrainosus, not intractable    Major depression, recurrent    Hypothyroidism (acquired)    Lactic acidosis    Chronic, continuous use of opioids      PMH:  Past Medical History:   Diagnosis Date    Fibromyalgia     Hyperlipidemia     Hypertension     Hypothyroidism     Low back pain     Migraine     Neuropathy        Treatment Plan:  Seizure  Suspect recurrence of her underlying seizure disorder.  Loaded with Keppra in the ER.  Neurology consult appreciate recommendations.  Anticipate that she will get an EEG today and need to be on antiepileptic therapy going forward.  Can transfer out of the ICU after EEG and clearance by neurology     Lactic acidosis  Likely secondary to multiple tonic-clonic seizures.  Receiving IV fluids.  Trend lactate.  No evidence of infection.  Procalcitonin negative.     Hypothyroidism  TSH at goal, continue home Synthroid     Major depression  Resume home medications.     Chronic opioid use  Written by Dr. Carney for chronic headaches.  Will reorder home medication but need to use it cautiously with her sedation postictally currently.     Disposition: Stable for transfer to the floor after EEG if cleared by neurology    Reviewed treatment plans with the patient and/or family.   30 minutes spent in face to face interaction and coordination of care.     Electronically signed by Amol Molina MD on 12/10/2023 at 09:02 CST

## 2023-12-11 LAB
QT INTERVAL: 490 MS
QTC INTERVAL: 589 MS

## 2023-12-11 PROCEDURE — 25010000002 LEVETIRACETAM IN NACL 0.82% 500 MG/100ML SOLUTION: Performed by: PSYCHIATRY & NEUROLOGY

## 2023-12-11 PROCEDURE — 99232 SBSQ HOSP IP/OBS MODERATE 35: CPT | Performed by: CLINICAL NURSE SPECIALIST

## 2023-12-11 PROCEDURE — 25010000002 ENOXAPARIN PER 10 MG: Performed by: FAMILY MEDICINE

## 2023-12-11 PROCEDURE — 99233 SBSQ HOSP IP/OBS HIGH 50: CPT | Performed by: CLINICAL NURSE SPECIALIST

## 2023-12-11 RX ORDER — NICOTINE 21 MG/24HR
1 PATCH, TRANSDERMAL 24 HOURS TRANSDERMAL
Status: DISCONTINUED | OUTPATIENT
Start: 2023-12-11 | End: 2023-12-12 | Stop reason: HOSPADM

## 2023-12-11 RX ORDER — LEVETIRACETAM 500 MG/1
500 TABLET ORAL 2 TIMES DAILY
Status: DISCONTINUED | OUTPATIENT
Start: 2023-12-11 | End: 2023-12-12 | Stop reason: HOSPADM

## 2023-12-11 RX ADMIN — Medication 10 ML: at 08:15

## 2023-12-11 RX ADMIN — ROPINIROLE HYDROCHLORIDE 2 MG: 1 TABLET, FILM COATED ORAL at 20:12

## 2023-12-11 RX ADMIN — GABAPENTIN 600 MG: 300 CAPSULE ORAL at 21:20

## 2023-12-11 RX ADMIN — Medication 10 ML: at 21:15

## 2023-12-11 RX ADMIN — OXYCODONE HYDROCHLORIDE AND ACETAMINOPHEN 1 TABLET: 10; 325 TABLET ORAL at 16:00

## 2023-12-11 RX ADMIN — GABAPENTIN 600 MG: 300 CAPSULE ORAL at 05:42

## 2023-12-11 RX ADMIN — LISINOPRIL 5 MG: 5 TABLET ORAL at 08:14

## 2023-12-11 RX ADMIN — PRAVASTATIN SODIUM 20 MG: 20 TABLET ORAL at 13:25

## 2023-12-11 RX ADMIN — BUTALBITAL, ASPIRIN, CAFFEINE AND CODEINE PHOSPHATE 1 CAPSULE: 50; 325; 40; 30 CAPSULE ORAL at 20:12

## 2023-12-11 RX ADMIN — LEVETIRACETAM 500 MG: 500 TABLET, FILM COATED ORAL at 20:12

## 2023-12-11 RX ADMIN — ARIPIPRAZOLE 10 MG: 10 TABLET ORAL at 13:25

## 2023-12-11 RX ADMIN — NICOTINE 1 PATCH: 14 PATCH, EXTENDED RELEASE TRANSDERMAL at 16:00

## 2023-12-11 RX ADMIN — GABAPENTIN 600 MG: 300 CAPSULE ORAL at 13:25

## 2023-12-11 RX ADMIN — SERTRALINE HYDROCHLORIDE 25 MG: 50 TABLET, FILM COATED ORAL at 08:13

## 2023-12-11 RX ADMIN — LEVETIRACETAM 500 MG: 5 INJECTION INTRAVASCULAR at 08:14

## 2023-12-11 RX ADMIN — LEVOTHYROXINE SODIUM 88 MCG: 88 TABLET ORAL at 05:42

## 2023-12-11 RX ADMIN — CLONAZEPAM 0.5 MG: 0.5 TABLET ORAL at 08:13

## 2023-12-11 RX ADMIN — BUTALBITAL, ASPIRIN, CAFFEINE AND CODEINE PHOSPHATE 1 CAPSULE: 50; 325; 40; 30 CAPSULE ORAL at 08:22

## 2023-12-11 RX ADMIN — ENOXAPARIN SODIUM 40 MG: 100 INJECTION SUBCUTANEOUS at 14:09

## 2023-12-11 RX ADMIN — CLONAZEPAM 0.5 MG: 0.5 TABLET ORAL at 20:12

## 2023-12-11 NOTE — NURSING NOTE
Pt arrived to floor from unit transported by SHELBY Solomon. Bedside report completed. VSS on 2L NC. Daughter at bedside. Tele placed. Seizure precautions initiated, no szr activity witnessed.

## 2023-12-11 NOTE — PROGRESS NOTES
Daily Progress Note  Destiny Morelos  MRN: [id number] LOS: 2    Admit Date: 12/9/2023 12/11/2023     Subjective:          Chief Complaint:  Chief Complaint   Patient presents with    Altered Mental Status       Interval History:    Reviewed overnight events and nursing notesno. No new complaints. No further seizures    Review of Systems   Constitutional:  Negative for chills and fever.   Respiratory:  Negative for shortness of breath.    Neurological:  Negative for seizures.       DIET:  Diet: Regular/House Diet; Texture: Regular Texture (IDDSI 7); Fluid Consistency: Thin (IDDSI 0)    Medications:      ARIPiprazole, 10 mg, Oral, Q24H  clonazePAM, 0.5 mg, Oral, Q12H  enoxaparin, 40 mg, Subcutaneous, Q24H  gabapentin, 600 mg, Oral, Q8H  levETIRAcetam, 500 mg, Oral, BID  levothyroxine, 88 mcg, Oral, Q AM  lisinopril, 5 mg, Oral, Daily  nicotine, 1 patch, Transdermal, Q24H  pravastatin, 20 mg, Oral, Q24H  rOPINIRole, 2 mg, Oral, Nightly  senna-docusate sodium, 2 tablet, Oral, BID  sertraline, 25 mg, Oral, Daily  sodium chloride, 10 mL, Intravenous, Q12H        Data:     Code Status:   Code Status and Medical Interventions:   Ordered at: 12/09/23 1217     Code Status (Patient has no pulse and is not breathing):    CPR (Attempt to Resuscitate)     Medical Interventions (Patient has pulse or is breathing):    Full Support       Family History   Problem Relation Age of Onset    Cancer Mother     Diabetes Mother     Cancer Father      Social History     Socioeconomic History    Marital status: Single   Tobacco Use    Smoking status: Every Day     Packs/day: 1     Types: Cigarettes    Smokeless tobacco: Never   Vaping Use    Vaping Use: Never used   Substance and Sexual Activity    Alcohol use: No    Drug use: No    Sexual activity: Defer       Labs:    Lab Results (last 72 hours)       Procedure Component Value Units Date/Time    Potassium [[id number]]  (Normal) Collected: 12/10/23 0756    Specimen: Blood Updated: 12/10/23  0844     Potassium 3.7 mmol/L     Comprehensive Metabolic Panel [[id number]]  (Abnormal) Collected: 12/10/23 0307    Specimen: Blood Updated: 12/10/23 0356     Glucose 82 mg/dL      BUN 10 mg/dL      Creatinine 0.38 mg/dL      Sodium 136 mmol/L      Potassium 3.3 mmol/L      Chloride 99 mmol/L      CO2 27.0 mmol/L      Calcium 8.0 mg/dL      Total Protein 5.6 g/dL      Albumin 3.3 g/dL      ALT (SGPT) 28 U/L      AST (SGOT) 33 U/L      Alkaline Phosphatase 81 U/L      Total Bilirubin 0.5 mg/dL      Globulin 2.3 gm/dL      A/G Ratio 1.4 g/dL      BUN/Creatinine Ratio 26.3     Anion Gap 10.0 mmol/L      eGFR 117.0 mL/min/1.73     Narrative:      GFR Normal >60  Chronic Kidney Disease <60  Kidney Failure <15      CBC Auto Differential [[id number]]  (Abnormal) Collected: 12/10/23 0307    Specimen: Blood Updated: 12/10/23 0332     WBC 9.68 10*3/mm3      RBC 3.77 10*6/mm3      Hemoglobin 12.4 g/dL      Hematocrit 37.4 %      MCV 99.2 fL      MCH 32.9 pg      MCHC 33.2 g/dL      RDW 12.4 %      RDW-SD 45.3 fl      MPV 9.8 fL      Platelets 256 10*3/mm3      Neutrophil % 51.7 %      Lymphocyte % 38.3 %      Monocyte % 8.3 %      Eosinophil % 0.9 %      Basophil % 0.4 %      Immature Grans % 0.4 %      Neutrophils, Absolute 5.00 10*3/mm3      Lymphocytes, Absolute 3.71 10*3/mm3      Monocytes, Absolute 0.80 10*3/mm3      Eosinophils, Absolute 0.09 10*3/mm3      Basophils, Absolute 0.04 10*3/mm3      Immature Grans, Absolute 0.04 10*3/mm3      nRBC 0.0 /100 WBC     Basic Metabolic Panel [[id number]]  (Abnormal) Collected: 12/10/23 0012    Specimen: Blood Updated: 12/10/23 0045     Glucose 91 mg/dL      BUN 11 mg/dL      Creatinine 0.34 mg/dL      Sodium 135 mmol/L      Potassium 3.9 mmol/L      Chloride 99 mmol/L      CO2 27.0 mmol/L      Calcium 7.9 mg/dL      BUN/Creatinine Ratio 32.4     Anion Gap 9.0 mmol/L      eGFR 120.2 mL/min/1.73     Narrative:      GFR Normal >60  Chronic Kidney Disease <60  Kidney Failure <15      CK  [[id number]]  (Abnormal) Collected: 12/09/23 1641    Specimen: Blood Updated: 12/09/23 1803     Creatine Kinase 1,074 U/L     Basic Metabolic Panel [[id number]]  (Abnormal) Collected: 12/09/23 1641    Specimen: Blood Updated: 12/09/23 1740     Glucose 94 mg/dL      BUN 13 mg/dL      Creatinine 0.42 mg/dL      Sodium 136 mmol/L      Potassium 2.5 mmol/L      Chloride 96 mmol/L      CO2 28.0 mmol/L      Calcium 8.0 mg/dL      BUN/Creatinine Ratio 31.0     Anion Gap 12.0 mmol/L      eGFR 114.3 mL/min/1.73     Narrative:      GFR Normal >60  Chronic Kidney Disease <60  Kidney Failure <15      Phosphorus [[id number]]  (Normal) Collected: 12/09/23 1641    Specimen: Blood Updated: 12/09/23 1738     Phosphorus 2.6 mg/dL     STAT Lactic Acid, Reflex [[id number]]  (Normal) Collected: 12/09/23 1159    Specimen: Blood Updated: 12/09/23 1311     Lactate 1.9 mmol/L     Urine Drug Screen - Urine, Clean Catch [[id number]]  (Normal) Collected: 12/09/23 0903    Specimen: Urine, Clean Catch Updated: 12/09/23 0955     THC, Screen, Urine Negative     Phencyclidine (PCP), Urine Negative     Cocaine Screen, Urine Negative     Methamphetamine, Ur Negative     Opiate Screen Negative     Amphetamine Screen, Urine Negative     Benzodiazepine Screen, Urine Negative     Tricyclic Antidepressants Screen Negative     Methadone Screen, Urine Negative     Barbiturates Screen, Urine Negative     Oxycodone Screen, Urine Negative     Buprenorphine, Screen, Urine Negative    Narrative:      Cutoff For Drugs Screened:    Amphetamines               500 ng/ml  Barbiturates               200 ng/ml  Benzodiazepines            150 ng/ml  Cocaine                    150 ng/ml  Methadone                  200 ng/ml  Opiates                    100 ng/ml  Phencyclidine               25 ng/ml  THC                         50 ng/ml  Methamphetamine            500 ng/ml  Tricyclic Antidepressants  300 ng/ml  Oxycodone                  100 ng/ml  Buprenorphine                10 ng/ml    The normal value for all drugs tested is negative. This report includes unconfirmed screening results, with the cutoff values listed, to be used for medical treatment purposes only.  Unconfirmed results must not be used for non-medical purposes such as employment or legal testing.  Clinical consideration should be applied to any drug of abuse test, particularly when unconfirmed results are used.      Fentanyl, Urine - Urine, Clean Catch [[id number]]  (Normal) Collected: 12/09/23 0903    Specimen: Urine, Clean Catch Updated: 12/09/23 0946     Fentanyl, Urine Negative    Narrative:      Negative Threshold:      Fentanyl 5 ng/mL     The normal value for the drug tested is negative. This report includes final unconfirmed screening results to be used for medical treatment purposes only. Unconfirmed results must not be used for non-medical purposes such as employment or legal testing. Clinical consideration should be applied to any drug of abuse test, particularly when unconfirmed results are used.           Osmolality, Serum [[id number]]  (Normal) Collected: 12/09/23 0831    Specimen: Blood Updated: 12/09/23 0944     Osmolality 281 mOsm/kg     Comprehensive Metabolic Panel [[id number]]  (Abnormal) Collected: 12/09/23 0831    Specimen: Blood Updated: 12/09/23 0937     Glucose 120 mg/dL      BUN 15 mg/dL      Creatinine 0.80 mg/dL      Sodium 133 mmol/L      Potassium 2.3 mmol/L      Comment: Slight hemolysis detected by analyzer. Result may be falsely elevated.        Chloride 86 mmol/L      CO2 26.0 mmol/L      Calcium 9.3 mg/dL      Total Protein 7.6 g/dL      Albumin 4.5 g/dL      ALT (SGPT) 41 U/L      AST (SGOT) 40 U/L      Alkaline Phosphatase 116 U/L      Total Bilirubin 0.6 mg/dL      Globulin 3.1 gm/dL      A/G Ratio 1.5 g/dL      BUN/Creatinine Ratio 18.8     Anion Gap 21.0 mmol/L      eGFR 86.1 mL/min/1.73     Narrative:      GFR Normal >60  Chronic Kidney Disease <60  Kidney Failure <15       "Phosphorus [[id number]]  (Abnormal) Collected: 12/09/23 0831    Specimen: Blood Updated: 12/09/23 0937     Phosphorus 1.9 mg/dL     Lactic Acid, Plasma [[id number]]  (Abnormal) Collected: 12/09/23 0831    Specimen: Blood Updated: 12/09/23 0937     Lactate 8.7 mmol/L     Magnesium [[id number]]  (Normal) Collected: 12/09/23 0831    Specimen: Blood Updated: 12/09/23 0936     Magnesium 2.6 mg/dL     TSH [[id number]]  (Normal) Collected: 12/09/23 0831    Specimen: Blood Updated: 12/09/23 0933     TSH 1.260 uIU/mL     Procalcitonin [[id number]]  (Normal) Collected: 12/09/23 0831    Specimen: Blood Updated: 12/09/23 0932     Procalcitonin 0.08 ng/mL     Narrative:      As a Marker for Sepsis (Non-Neonates):    1. <0.5 ng/mL represents a low risk of severe sepsis and/or septic shock.  2. >2 ng/mL represents a high risk of severe sepsis and/or septic shock.    As a Marker for Lower Respiratory Tract Infections that require antibiotic therapy:    PCT on Admission    Antibiotic Therapy       6-12 Hrs later    >0.5                Strongly Recommended  >0.25 - <0.5        Recommended   0.1 - 0.25          Discouraged              Remeasure/reassess PCT  <0.1                Strongly Discouraged     Remeasure/reassess PCT    As 28 day mortality risk marker: \"Change in Procalcitonin Result\" (>80% or <=80%) if Day 0 (or Day 1) and Day 4 values are available. Refer to http://www.[facility]-pct-calculator.com    Change in PCT <=80%  A decrease of PCT levels below or equal to 80% defines a positive change in PCT test result representing a higher risk for 28-day all-cause mortality of patients diagnosed with severe sepsis for septic shock.    Change in PCT >80%  A decrease of PCT levels of more than 80% defines a negative change in PCT result representing a lower risk for 28-day all-cause mortality of patients diagnosed with severe sepsis or septic shock.       Urinalysis, Microscopic Only - Urine, Catheter [[id number]]  (Abnormal) Collected: " 12/09/23 0903    Specimen: Urine, Catheter Updated: 12/09/23 0931     RBC, UA None Seen /HPF      WBC, UA 0-2 /HPF      Comment: Urine culture not indicated.        Bacteria, UA Trace /HPF      Squamous Epithelial Cells, UA 3-6 /HPF      Hyaline Casts, UA 0-2 /LPF      Methodology Manual Light Microscopy    Salicylate Level [[id number]]  (Normal) Collected: 12/09/23 0831    Specimen: Blood Updated: 12/09/23 0927     Salicylate <0.3 mg/dL     C-reactive Protein [[id number]]  (Abnormal) Collected: 12/09/23 0831    Specimen: Blood Updated: 12/09/23 0927     C-Reactive Protein 4.96 mg/dL     Urinalysis With Culture If Indicated - Urine, Catheter [[id number]]  (Abnormal) Collected: 12/09/23 0903    Specimen: Urine, Catheter Updated: 12/09/23 0927     Color, UA Dark Yellow     Appearance, UA Clear     pH, UA 5.5     Specific Gravity, UA 1.021     Glucose, UA Negative     Ketones, UA 15 mg/dL (1+)     Bilirubin, UA Small (1+)     Blood, UA Trace     Protein, UA 30 mg/dL (1+)     Leuk Esterase, UA Trace     Nitrite, UA Negative     Urobilinogen, UA 1.0 E.U./dL    Narrative:      In absence of clinical symptoms, the presence of pyuria, bacteria, and/or nitrites on the urinalysis result does not correlate with infection.    Acetaminophen Level [[id number]]  (Normal) Collected: 12/09/23 0831    Specimen: Blood Updated: 12/09/23 0926     Acetaminophen <5.0 mcg/mL     Ammonia [[id number]]  (Normal) Collected: 12/09/23 0831    Specimen: Blood Updated: 12/09/23 0923     Ammonia 47 umol/L     Ethanol [[id number]] Collected: 12/09/23 0831    Specimen: Blood Updated: 12/09/23 0922     Ethanol % <0.010 %     Narrative:      Not for legal purposes. Chain of Custody not followed.     Protime-INR [[id number]]  (Abnormal) Collected: 12/09/23 0831    Specimen: Blood Updated: 12/09/23 0854     Protime 11.3 Seconds      INR 0.82    CBC & Differential [[id number]]  (Abnormal) Collected: 12/09/23 0831    Specimen: Blood Updated: 12/09/23 0843     Narrative:      The following orders were created for panel order CBC & Differential.  Procedure                               Abnormality         Status                     ---------                               -----------         ------                     CBC Auto Differential[[id number]]        Abnormal            Final result                 Please view results for these tests on the individual orders.    CBC Auto Differential [[id number]]  (Abnormal) Collected: 12/09/23 0831    Specimen: Blood Updated: 12/09/23 0843     WBC 13.97 10*3/mm3      RBC 4.49 10*6/mm3      Hemoglobin 14.9 g/dL      Hematocrit 43.1 %      MCV 96.0 fL      MCH 33.2 pg      MCHC 34.6 g/dL      RDW 12.3 %      RDW-SD 43.6 fl      MPV 10.2 fL      Platelets 355 10*3/mm3      Neutrophil % 82.3 %      Lymphocyte % 9.7 %      Monocyte % 7.1 %      Eosinophil % 0.1 %      Basophil % 0.2 %      Immature Grans % 0.6 %      Neutrophils, Absolute 11.50 10*3/mm3      Lymphocytes, Absolute 1.36 10*3/mm3      Monocytes, Absolute 0.99 10*3/mm3      Eosinophils, Absolute 0.01 10*3/mm3      Basophils, Absolute 0.03 10*3/mm3      Immature Grans, Absolute 0.08 10*3/mm3      nRBC 0.0 /100 WBC     Blood Gas, Arterial - [[id number]]  (Abnormal) Collected: 12/09/23 0837    Specimen: Arterial Blood Updated: 12/09/23 0835     Site Right Radial     Kevin's Test Positive     pH, Arterial 7.472 pH units      Comment: 83 Value above reference range        pCO2, Arterial 42.1 mm Hg      pO2, Arterial 62.0 mm Hg      Comment: 84 Value below reference range        HCO3, Arterial 30.8 mmol/L      Comment: 83 Value above reference range        Base Excess, Arterial 6.4 mmol/L      Comment: 83 Value above reference range        O2 Saturation, Arterial 93.9 %      Comment: 84 Value below reference range        Temperature 37.0     Barometric Pressure for Blood Gas 747 mmHg      Modality Nasal Cannula     Flow Rate 2.0 lpm      Ventilator Mode NA     Collected by [id number]     " Comment: Meter: [id number]     :  [id number]        pCO2, Temperature Corrected 42.1 mm Hg      pH, Temp Corrected 7.472 pH Units      pO2, Temperature Corrected 62.0 mm Hg               Objective:     Vitals: /60 (BP Location: Right arm, Patient Position: Lying)   Pulse 69   Temp 99.5 °F (37.5 °C) (Oral)   Resp 15   Ht 152.4 cm (60\")   Wt 59 kg (130 lb)   SpO2 98%   BMI 25.39 kg/m²    Intake/Output Summary (Last 24 hours) at 2023  Last data filed at 2023 1900  Gross per 24 hour   Intake 640 ml   Output --   Net 640 ml    Temp (24hrs), Av.4 °F (36.9 °C), Min:97.5 °F (36.4 °C), Max:100.2 °F (37.9 °C)      Physical Exam  Constitutional:       Appearance: She is well-developed.   HENT:      Head: Normocephalic and atraumatic.   Eyes:      Conjunctiva/sclera: Conjunctivae normal.      Pupils: Pupils are equal, round, and reactive to light.   Cardiovascular:      Rate and Rhythm: Normal rate and regular rhythm.      Heart sounds: Normal heart sounds. No murmur heard.     No friction rub.   Pulmonary:      Effort: Pulmonary effort is normal. No respiratory distress.      Breath sounds: Normal breath sounds. No wheezing or rales.   Abdominal:      General: Bowel sounds are normal. There is no distension.      Palpations: Abdomen is soft.      Tenderness: There is no abdominal tenderness.   Musculoskeletal:         General: Normal range of motion.      Cervical back: Normal range of motion.   Skin:     Capillary Refill: Capillary refill takes less than 2 seconds.   Neurological:      Mental Status: She is alert and oriented to person, place, and time.      Cranial Nerves: No cranial nerve deficit.   Psychiatric:         Behavior: Behavior normal.             Assessment and Plan:     Primary Problem:  Seizure  Hypokalemia  AMS  Elevated CPK  Hospital Problem list:    Seizure    Chronic pain    Migraine without status migrainosus, not intractable    Major depression, recurrent    " Hypothyroidism (acquired)    Lactic acidosis    Chronic, continuous use of opioids      PMH:  Past Medical History:   Diagnosis Date    Fibromyalgia     Hyperlipidemia     Hypertension     Hypothyroidism     Low back pain     Migraine     Neuropathy        Treatment Plan:  Seizure  Suspect recurrence of her underlying seizure disorder.  Loaded with Keppra in the ER.  Neurology consult appreciate recommendations.  Anticipate that she will get an EEG today and need to be on antiepileptic therapy going forward.  Await Neurology clearance for dc. She reports not having EEG yet     Lactic acidosis  Likely secondary to multiple tonic-clonic seizures.  Receiving IV fluids.  Trend lactate.  No evidence of infection.  Procalcitonin negative. CPK trending down     Hypothyroidism  TSH at goal, continue home Synthroid     Major depression  Resume home medications.     Chronic opioid use  Written by Dr. Carney for chronic headaches.  Will reorder home medication but need to use it cautiously with her sedation postictally currently.     Disposition: Stable for transfer to the floor after EEG if cleared by neurology    Reviewed treatment plans with the patient and/or family.   30 minutes spent in face to face interaction and coordination of care.     Electronically signed by Ken Melvin MD on 12/11/2023 at 22:08 CST     Signed:  Ken Melvin MD   12/11/2023, 22:08 CST

## 2023-12-11 NOTE — PAYOR COMM NOTE
"Kenia Smith (58 y.o. Female)      [id number]   Admit 12/9   [facility]   richard phone [phone]   fax           Date of Birth   1965    Social Security Number       Address   [address] [place] [id number]    Home Phone   [phone]    MRN   [id number]       Restoration   Other    Marital Status   Single                            Admission Date   12/9/23    Admission Type   Emergency    Admitting Provider   London Rascon MD    Attending Provider   London Rascon MD    Department, Room/Bed   [facility] 3A, 339/1       Discharge Date       Discharge Disposition       Discharge Destination                                 Attending Provider: London Rascon MD    Allergies: Morphine    Isolation: None   Infection: None   Code Status: CPR    Ht: 152.4 cm (60\")   Wt: 59 kg (130 lb)    Admission Cmt: None   Principal Problem: Seizure [R56.9]                   Active Insurance as of 12/9/2023       Primary Coverage       Payor Plan Insurance Group Employer/Plan Group    ANTH MEDICARE REPLACEMENT ANTHEM MEDICARE ADVANTAGE [id number]       Payor Plan Address Payor Plan Phone Number Payor Plan Fax Number Effective Dates    [address] [phone]  5/1/2023 - None Entered    [facility] [address]         Subscriber Name Subscriber Birth Date Member ID       KENIA SMITH 1965 [id number]                     Emergency Contacts        (Rel.) Home Phone Work Phone Mobile Phone    Aruna Werner (Daughter) [phone] -- [phone]                 History & Physical        Amol Molina MD at 12/09/23 1211              History and Physical    Patient:  Kenia Smith  MRN: [id number]    CHIEF COMPLAINT: Seizure    History Obtained From: the patient   PCP: London Rascon MD    HISTORY OF PRESENT ILLNESS:   The patient is a 58 y.o. female who presents with acute onset of seizure.  No family at bedside and patient is " mildly postictal.  States that she had seizures in childhood and her last seizure was 25 years ago.  Has not had a seizure medication prescribed in the last 10 years.  Follows with Dr. Carney for chronic headaches for which she is on chronic opioid therapy.  She also has significant psychiatric illnesses for which she takes atypical antipsychotic, benzodiazepines, and SSRI.  She denies any complaints of pain currently.  She is somewhat somnolent and difficult to get history from.  Per ER physician, the patient had multiple small seizures in the ER.  She was loaded with 2 g of Keppra admitted to ICU for further monitoring and neurologic evaluation.    REVIEW OF SYSTEMS:    Review of Systems   Unable to perform ROS: Mental status change          Past Medical History:  Past Medical History:   Diagnosis Date    Fibromyalgia     Hyperlipidemia     Hypertension     Hypothyroidism     Low back pain     Migraine     Neuropathy        Past Surgical History:  Past Surgical History:   Procedure Laterality Date    APPENDECTOMY      HYSTERECTOMY      OOPHORECTOMY         Medications Prior to Admission:    Medications Prior to Admission   Medication Sig Dispense Refill Last Dose    ARIPiprazole (ABILIFY) 10 MG tablet Take 1 tablet by mouth Daily.       benztropine (COGENTIN) 1 MG tablet Take 1 mg by mouth 2 (Two) Times a Day.       butalbital-acetaminophen-caffeine-codeine (FIORICET WITH CODEINE) -65-30 MG per capsule TAKE (1) CAPSULE EVERY SIX HOURS AS NEEDED.       butalbital-aspirin-caffeine-codeine (FIORINAL WITH CODEINE) -82-30 MG capsule Take 1 capsule by mouth Every 4 (Four) Hours As Needed for Headache.       carisoprodol (SOMA) 350 MG tablet Take 350 mg by mouth 2 (two) times a day.       clonazePAM (KlonoPIN) 0.5 MG tablet Take 0.5 mg by mouth every night at bedtime.       Estrogens Conjugated (PREMARIN PO) Take  by mouth Daily.       fenofibrate 160 MG tablet Take 1 tablet by mouth Daily.       gabapentin  "(NEURONTIN) 800 MG tablet Take 800 mg by mouth 3 (Three) Times a Day.       levothyroxine (SYNTHROID, LEVOTHROID) 88 MCG tablet TAKE 1 TABLET IN THE MORNING ON AN EMPTY STOMACH       LEVOTHYROXINE SODIUM PO Take  by mouth Daily.       lisinopril (PRINIVIL,ZESTRIL) 5 MG tablet Take 5 mg by mouth Daily.       LORazepam (ATIVAN) 1 MG tablet TAKE 1 TABLET BY MOUTH ONCE DAILY AS NEEDED FOR PANIC ATTACKS       meloxicam (MOBIC) 15 MG tablet TAKE 1 TABLET BY MOUTH DAILY. 30 tablet 0     oxyCODONE-acetaminophen (PERCOCET)  MG per tablet Take 1 tablet by mouth Every 6 (Six) Hours As Needed.       pravastatin (PRAVACHOL) 20 MG tablet Take 1 tablet by mouth Daily.       rOPINIRole (REQUIP) 2 MG tablet Take 1 tablet by mouth Daily.       sertraline (ZOLOFT) 25 MG tablet Take 25 mg by mouth Daily.       Vraylar 3 MG capsule capsule Take 3 mg by mouth Daily.       zolpidem CR (AMBIEN CR) 12.5 MG CR tablet Take 12.5 mg by mouth At Night As Needed.          Allergies:  Morphine    Social History:   Social History     Socioeconomic History    Marital status: Single   Tobacco Use    Smoking status: Every Day     Packs/day: 1     Types: Cigarettes    Smokeless tobacco: Never   Substance and Sexual Activity    Alcohol use: No    Drug use: No    Sexual activity: Defer       Family History:   Family History   Problem Relation Age of Onset    Cancer Mother     Diabetes Mother     Cancer Father            Physical Exam:    Vitals: /81   Pulse 104   Temp 99.7 °F (37.6 °C) (Oral)   Resp 20   Ht 152.4 cm (60\")   Wt 59 kg (130 lb)   SpO2 100%   BMI 25.39 kg/m²   Physical Exam  Constitutional:       Appearance: She is well-developed.      Comments: Somnolent but arousable   HENT:      Head: Normocephalic and atraumatic.   Eyes:      Conjunctiva/sclera: Conjunctivae normal.      Pupils: Pupils are equal, round, and reactive to light.   Cardiovascular:      Rate and Rhythm: Normal rate and regular rhythm.      Heart sounds: " Normal heart sounds. No murmur heard.     No friction rub.   Pulmonary:      Effort: Pulmonary effort is normal. No respiratory distress.      Breath sounds: Normal breath sounds. No wheezing or rales.   Abdominal:      General: Bowel sounds are normal. There is no distension.      Palpations: Abdomen is soft.      Tenderness: There is no abdominal tenderness.   Musculoskeletal:         General: Normal range of motion.      Cervical back: Normal range of motion.   Skin:     Capillary Refill: Capillary refill takes less than 2 seconds.   Neurological:      Mental Status: She is oriented to person, place, and time.      Cranial Nerves: No cranial nerve deficit.   Psychiatric:         Behavior: Behavior normal.           Lab Results (last 24 hours)       Procedure Component Value Units Date/Time    Urine Drug Screen - Urine, Clean Catch [[id number]]  (Normal) Collected: 12/09/23 0903    Specimen: Urine, Clean Catch Updated: 12/09/23 0955     THC, Screen, Urine Negative     Phencyclidine (PCP), Urine Negative     Cocaine Screen, Urine Negative     Methamphetamine, Ur Negative     Opiate Screen Negative     Amphetamine Screen, Urine Negative     Benzodiazepine Screen, Urine Negative     Tricyclic Antidepressants Screen Negative     Methadone Screen, Urine Negative     Barbiturates Screen, Urine Negative     Oxycodone Screen, Urine Negative     Buprenorphine, Screen, Urine Negative    Narrative:      Cutoff For Drugs Screened:    Amphetamines               500 ng/ml  Barbiturates               200 ng/ml  Benzodiazepines            150 ng/ml  Cocaine                    150 ng/ml  Methadone                  200 ng/ml  Opiates                    100 ng/ml  Phencyclidine               25 ng/ml  THC                         50 ng/ml  Methamphetamine            500 ng/ml  Tricyclic Antidepressants  300 ng/ml  Oxycodone                  100 ng/ml  Buprenorphine               10 ng/ml    The normal value for all drugs tested is  negative. This report includes unconfirmed screening results, with the cutoff values listed, to be used for medical treatment purposes only.  Unconfirmed results must not be used for non-medical purposes such as employment or legal testing.  Clinical consideration should be applied to any drug of abuse test, particularly when unconfirmed results are used.      Fentanyl, Urine - Urine, Clean Catch [[id number]]  (Normal) Collected: 12/09/23 0903    Specimen: Urine, Clean Catch Updated: 12/09/23 0946     Fentanyl, Urine Negative    Narrative:      Negative Threshold:      Fentanyl 5 ng/mL     The normal value for the drug tested is negative. This report includes final unconfirmed screening results to be used for medical treatment purposes only. Unconfirmed results must not be used for non-medical purposes such as employment or legal testing. Clinical consideration should be applied to any drug of abuse test, particularly when unconfirmed results are used.           Osmolality, Serum [[id number]]  (Normal) Collected: 12/09/23 0831    Specimen: Blood Updated: 12/09/23 0944     Osmolality 281 mOsm/kg     Comprehensive Metabolic Panel [[id number]]  (Abnormal) Collected: 12/09/23 0831    Specimen: Blood Updated: 12/09/23 0937     Glucose 120 mg/dL      BUN 15 mg/dL      Creatinine 0.80 mg/dL      Sodium 133 mmol/L      Potassium 2.3 mmol/L      Comment: Slight hemolysis detected by analyzer. Result may be falsely elevated.        Chloride 86 mmol/L      CO2 26.0 mmol/L      Calcium 9.3 mg/dL      Total Protein 7.6 g/dL      Albumin 4.5 g/dL      ALT (SGPT) 41 U/L      AST (SGOT) 40 U/L      Alkaline Phosphatase 116 U/L      Total Bilirubin 0.6 mg/dL      Globulin 3.1 gm/dL      A/G Ratio 1.5 g/dL      BUN/Creatinine Ratio 18.8     Anion Gap 21.0 mmol/L      eGFR 86.1 mL/min/1.73     Narrative:      GFR Normal >60  Chronic Kidney Disease <60  Kidney Failure <15      Phosphorus [[id number]]  (Abnormal) Collected: 12/09/23 0831     "Specimen: Blood Updated: 12/09/23 0937     Phosphorus 1.9 mg/dL     Lactic Acid, Plasma [[id number]]  (Abnormal) Collected: 12/09/23 0831    Specimen: Blood Updated: 12/09/23 0937     Lactate 8.7 mmol/L     Magnesium [[id number]]  (Normal) Collected: 12/09/23 0831    Specimen: Blood Updated: 12/09/23 0936     Magnesium 2.6 mg/dL     TSH [[id number]]  (Normal) Collected: 12/09/23 0831    Specimen: Blood Updated: 12/09/23 0933     TSH 1.260 uIU/mL     Procalcitonin [[id number]]  (Normal) Collected: 12/09/23 0831    Specimen: Blood Updated: 12/09/23 0932     Procalcitonin 0.08 ng/mL     Narrative:      As a Marker for Sepsis (Non-Neonates):    1. <0.5 ng/mL represents a low risk of severe sepsis and/or septic shock.  2. >2 ng/mL represents a high risk of severe sepsis and/or septic shock.    As a Marker for Lower Respiratory Tract Infections that require antibiotic therapy:    PCT on Admission    Antibiotic Therapy       6-12 Hrs later    >0.5                Strongly Recommended  >0.25 - <0.5        Recommended   0.1 - 0.25          Discouraged              Remeasure/reassess PCT  <0.1                Strongly Discouraged     Remeasure/reassess PCT    As 28 day mortality risk marker: \"Change in Procalcitonin Result\" (>80% or <=80%) if Day 0 (or Day 1) and Day 4 values are available. Refer to http://www.[email or web address]-pct-calculator.com    Change in PCT <=80%  A decrease of PCT levels below or equal to 80% defines a positive change in PCT test result representing a higher risk for 28-day all-cause mortality of patients diagnosed with severe sepsis for septic shock.    Change in PCT >80%  A decrease of PCT levels of more than 80% defines a negative change in PCT result representing a lower risk for 28-day all-cause mortality of patients diagnosed with severe sepsis or septic shock.       Urinalysis, Microscopic Only - Urine, Catheter [[id number]]  (Abnormal) Collected: 12/09/23 0903    Specimen: Urine, Catheter Updated: 12/09/23 0931 "     RBC, UA None Seen /HPF      WBC, UA 0-2 /HPF      Comment: Urine culture not indicated.        Bacteria, UA Trace /HPF      Squamous Epithelial Cells, UA 3-6 /HPF      Hyaline Casts, UA 0-2 /LPF      Methodology Manual Light Microscopy    Salicylate Level [[id number]]  (Normal) Collected: 12/09/23 0831    Specimen: Blood Updated: 12/09/23 0927     Salicylate <0.3 mg/dL     C-reactive Protein [[id number]]  (Abnormal) Collected: 12/09/23 0831    Specimen: Blood Updated: 12/09/23 0927     C-Reactive Protein 4.96 mg/dL     Urinalysis With Culture If Indicated - Urine, Catheter [[id number]]  (Abnormal) Collected: 12/09/23 0903    Specimen: Urine, Catheter Updated: 12/09/23 0927     Color, UA Dark Yellow     Appearance, UA Clear     pH, UA 5.5     Specific Gravity, UA 1.021     Glucose, UA Negative     Ketones, UA 15 mg/dL (1+)     Bilirubin, UA Small (1+)     Blood, UA Trace     Protein, UA 30 mg/dL (1+)     Leuk Esterase, UA Trace     Nitrite, UA Negative     Urobilinogen, UA 1.0 E.U./dL    Narrative:      In absence of clinical symptoms, the presence of pyuria, bacteria, and/or nitrites on the urinalysis result does not correlate with infection.    Acetaminophen Level [[id number]]  (Normal) Collected: 12/09/23 0831    Specimen: Blood Updated: 12/09/23 0926     Acetaminophen <5.0 mcg/mL     Ammonia [[id number]]  (Normal) Collected: 12/09/23 0831    Specimen: Blood Updated: 12/09/23 0923     Ammonia 47 umol/L     Ethanol [[id number]] Collected: 12/09/23 0831    Specimen: Blood Updated: 12/09/23 0922     Ethanol % <0.010 %     Narrative:      Not for legal purposes. Chain of Custody not followed.     Protime-INR [[id number]]  (Abnormal) Collected: 12/09/23 0831    Specimen: Blood Updated: 12/09/23 0854     Protime 11.3 Seconds      INR 0.82    CBC & Differential [[id number]]  (Abnormal) Collected: 12/09/23 0831    Specimen: Blood Updated: 12/09/23 0843    Narrative:      The following orders were created for panel order CBC &  Differential.  Procedure                               Abnormality         Status                     ---------                               -----------         ------                     CBC Auto Differential[[id number]]        Abnormal            Final result                 Please view results for these tests on the individual orders.    CBC Auto Differential [[id number]]  (Abnormal) Collected: 12/09/23 0831    Specimen: Blood Updated: 12/09/23 0843     WBC 13.97 10*3/mm3      RBC 4.49 10*6/mm3      Hemoglobin 14.9 g/dL      Hematocrit 43.1 %      MCV 96.0 fL      MCH 33.2 pg      MCHC 34.6 g/dL      RDW 12.3 %      RDW-SD 43.6 fl      MPV 10.2 fL      Platelets 355 10*3/mm3      Neutrophil % 82.3 %      Lymphocyte % 9.7 %      Monocyte % 7.1 %      Eosinophil % 0.1 %      Basophil % 0.2 %      Immature Grans % 0.6 %      Neutrophils, Absolute 11.50 10*3/mm3      Lymphocytes, Absolute 1.36 10*3/mm3      Monocytes, Absolute 0.99 10*3/mm3      Eosinophils, Absolute 0.01 10*3/mm3      Basophils, Absolute 0.03 10*3/mm3      Immature Grans, Absolute 0.08 10*3/mm3      nRBC 0.0 /100 WBC     Blood Gas, Arterial - [[id number]]  (Abnormal) Collected: 12/09/23 0837    Specimen: Arterial Blood Updated: 12/09/23 0835     Site Right Radial     Kevin's Test Positive     pH, Arterial 7.472 pH units      Comment: 83 Value above reference range        pCO2, Arterial 42.1 mm Hg      pO2, Arterial 62.0 mm Hg      Comment: 84 Value below reference range        HCO3, Arterial 30.8 mmol/L      Comment: 83 Value above reference range        Base Excess, Arterial 6.4 mmol/L      Comment: 83 Value above reference range        O2 Saturation, Arterial 93.9 %      Comment: 84 Value below reference range        Temperature 37.0     Barometric Pressure for Blood Gas 747 mmHg      Modality Nasal Cannula     Flow Rate 2.0 lpm      Ventilator Mode NA     Collected by [id number]     Comment: Meter: [id number]     :  [id number]        pCO2,  Temperature Corrected 42.1 mm Hg      pH, Temp Corrected 7.472 pH Units      pO2, Temperature Corrected 62.0 mm Hg              -----------------------------------------------------------------    Radiology:     XR Chest 1 View    Result Date: 12/9/2023  EXAM: XR CHEST 1 VW- 12/9/2023 8:26 AM  HISTORY: tachypnea   COMPARISON: 6/19/2019.  TECHNIQUE: Single frontal radiograph of the chest was obtained.  FINDINGS:  Support Devices: None.  Cardiac and Mediastinal Silhouettes: Normal.  Lungs/Pleura: No focal consolidation. No sizable pleural effusion. No visible pneumothorax.  Osseous structures: No acute osseous finding.  Other: None.       No acute cardiopulmonary abnormality.    This report was signed and finalized on 12/9/2023 9:58 AM by Shorty Ricci.      CT Head Without Contrast    Result Date: 12/9/2023  Exam: CT HEAD WO CONTRAST- 12/9/2023 9:06 AM  HISTORY: Mental status change, unknown cause   DOSE LENGTH PRODUCT: 847 mGy cm. Automated exposure control was also utilized to decrease patient radiation dose.  Technique: Helically acquired CT of the brain without IV contrast was performed. Sagittal and coronal reformations are also provided for review. Soft tissue and bone kernels are available for interpretation.  Comparison: MRI brain 9/18/2009.  Findings:  Ventricles and extra-axial CSF spaces are normal in size.  No intraparenchymal or extra-axial hemorrhage.  Gray-white matter differentiation is preserved.  Orbits are grossly unremarkable. Paranasal sinuses are grossly clear. Mastoid air cells are grossly clear.  No suspicious calvarial or extracranial soft tissue abnormality.  Other:None.      Impression:   No acute intracranial abnormality.  This report was signed and finalized on 12/9/2023 9:34 AM by Shorty Ricci.        Assessment and Plan   Seizure  Suspect recurrence of her underlying seizure disorder.  Loaded with Keppra in the ER.  Neurology consult appreciate recommendations.  Anticipate that she  will get an EEG and need to be on antiepileptic therapy going forward.  Will monitor in the ICU for the next 24 hours for recurrence.    Lactic acidosis  Likely secondary to multiple tonic-clonic seizures.  Receiving IV fluids.  Trend lactate.  No evidence of infection.  Procalcitonin negative.    Hypothyroidism  TSH at goal, continue home Synthroid    Major depression  Resume home medications.    Chronic opioid use  Written by Dr. Carney for chronic headaches.  Will reorder home medication but need to use it cautiously with her sedation postictally currently.    Disposition: Inpatient, critical care    CODE STATUS: Full    DVT prophylaxis: Lovenox    Critical care time: 41 minutes  Time spent acutely managing and stabilizing patient, obtaining history from outside sources including ER physician, reviewing medications and chart review for history as history from patient was limited.      Seizure    Chronic pain    Migraine without status migrainosus, not intractable    Major depression, recurrent    Hypothyroidism (acquired)    Lactic acidosis    Chronic, continuous use of opioids      Amol Molina MD 12/9/2023 12:20 CST     Electronically signed by Amol Molina MD at 12/09/23 1222          Emergency Department Notes        Sofia Hartley RN at 12/09/23 1124          Nursing report ED to floor  Destiny Morelos  58 y.o.  female    HPI:   Chief Complaint   Patient presents with    Altered Mental Status       Admitting doctor:   London Rascon MD    Consulting provider(s):  Consults       No orders found from 11/10/2023 to 12/10/2023.             Admitting diagnosis:   The primary encounter diagnosis was Seizure disorder. Diagnoses of Altered mental status, unspecified altered mental status type, Polypharmacy, Lactic acidosis, Hypokalemia, Prolonged Q-T interval on ECG, and High anion gap metabolic acidosis were also pertinent to this visit.    Code status:   Current Code Status       Date Active Code  Status Order ID Comments User Context       Not on file            Allergies:   Morphine    Intake and Output    Intake/Output Summary (Last 24 hours) at 12/9/2023 1124  Last data filed at 12/9/2023 0955  Gross per 24 hour   Intake 200 ml   Output --   Net 200 ml       Weight:       12/09/23  0819   Weight: 59 kg (130 lb)       Most recent vitals:   Vitals:    12/09/23 1007 12/09/23 1032 12/09/23 1047 12/09/23 1118   BP: (!) 154/103 (!) 137/115 (!) 139/125 143/81   Pulse: 120 112 104 104   Resp:    20   Temp:    99.7 °F (37.6 °C)   TempSrc:    Oral   SpO2: 96% 97% 100% 100%   Weight:       Height:         Oxygen Therapy: .2 L NC 02    Active LDAs/IV Access:   Lines, Drains & Airways       Active LDAs       Name Placement date Placement time Site Days    Peripheral IV 12/09/23 Left Antecubital 12/09/23  --  Antecubital  less than 1    Peripheral IV 12/09/23 0955 Right Antecubital 12/09/23  0955  Antecubital  less than 1                    Labs (abnormal labs have a star):   Labs Reviewed   COMPREHENSIVE METABOLIC PANEL - Abnormal; Notable for the following components:       Result Value    Glucose 120 (*)     Sodium 133 (*)     Potassium 2.3 (*)     Chloride 86 (*)     ALT (SGPT) 41 (*)     AST (SGOT) 40 (*)     Anion Gap 21.0 (*)     All other components within normal limits    Narrative:     GFR Normal >60  Chronic Kidney Disease <60  Kidney Failure <15     PROTIME-INR - Abnormal; Notable for the following components:    Protime 11.3 (*)     INR 0.82 (*)     All other components within normal limits   URINALYSIS W/ CULTURE IF INDICATED - Abnormal; Notable for the following components:    Color, UA Dark Yellow (*)     Ketones, UA 15 mg/dL (1+) (*)     Bilirubin, UA Small (1+) (*)     Blood, UA Trace (*)     Protein, UA 30 mg/dL (1+) (*)     Leuk Esterase, UA Trace (*)     All other components within normal limits    Narrative:     In absence of clinical symptoms, the presence of pyuria, bacteria, and/or nitrites on  the urinalysis result does not correlate with infection.   LACTIC ACID, PLASMA - Abnormal; Notable for the following components:    Lactate 8.7 (*)     All other components within normal limits   C-REACTIVE PROTEIN - Abnormal; Notable for the following components:    C-Reactive Protein 4.96 (*)     All other components within normal limits   PHOSPHORUS - Abnormal; Notable for the following components:    Phosphorus 1.9 (*)     All other components within normal limits   CBC WITH AUTO DIFFERENTIAL - Abnormal; Notable for the following components:    WBC 13.97 (*)     MCH 33.2 (*)     Neutrophil % 82.3 (*)     Lymphocyte % 9.7 (*)     Eosinophil % 0.1 (*)     Immature Grans % 0.6 (*)     Neutrophils, Absolute 11.50 (*)     Monocytes, Absolute 0.99 (*)     Immature Grans, Absolute 0.08 (*)     All other components within normal limits   BLOOD GAS, ARTERIAL - Abnormal; Notable for the following components:    pH, Arterial 7.472 (*)     pO2, Arterial 62.0 (*)     HCO3, Arterial 30.8 (*)     Base Excess, Arterial 6.4 (*)     O2 Saturation, Arterial 93.9 (*)     pH, Temp Corrected 7.472 (*)     pO2, Temperature Corrected 62.0 (*)     All other components within normal limits   URINALYSIS, MICROSCOPIC ONLY - Abnormal; Notable for the following components:    Bacteria, UA Trace (*)     Squamous Epithelial Cells, UA 3-6 (*)     All other components within normal limits   PROCALCITONIN - Normal    Narrative:     As a Marker for Sepsis (Non-Neonates):    1. <0.5 ng/mL represents a low risk of severe sepsis and/or septic shock.  2. >2 ng/mL represents a high risk of severe sepsis and/or septic shock.    As a Marker for Lower Respiratory Tract Infections that require antibiotic therapy:    PCT on Admission    Antibiotic Therapy       6-12 Hrs later    >0.5                Strongly Recommended  >0.25 - <0.5        Recommended   0.1 - 0.25          Discouraged              Remeasure/reassess PCT  <0.1                Strongly  "Discouraged     Remeasure/reassess PCT    As 28 day mortality risk marker: \"Change in Procalcitonin Result\" (>80% or <=80%) if Day 0 (or Day 1) and Day 4 values are available. Refer to [email or web address]-pct-[email or web address].com    Change in PCT <=80%  A decrease of PCT levels below or equal to 80% defines a positive change in PCT test result representing a higher risk for 28-day all-cause mortality of patients diagnosed with severe sepsis for septic shock.    Change in PCT >80%  A decrease of PCT levels of more than 80% defines a negative change in PCT result representing a lower risk for 28-day all-cause mortality of patients diagnosed with severe sepsis or septic shock.      ACETAMINOPHEN LEVEL - Normal   URINE DRUG SCREEN - Normal    Narrative:     Cutoff For Drugs Screened:    Amphetamines               500 ng/ml  Barbiturates               200 ng/ml  Benzodiazepines            150 ng/ml  Cocaine                    150 ng/ml  Methadone                  200 ng/ml  Opiates                    100 ng/ml  Phencyclidine               25 ng/ml  THC                         50 ng/ml  Methamphetamine            500 ng/ml  Tricyclic Antidepressants  300 ng/ml  Oxycodone                  100 ng/ml  Buprenorphine               10 ng/ml    The normal value for all drugs tested is negative. This report includes unconfirmed screening results, with the cutoff values listed, to be used for medical treatment purposes only.  Unconfirmed results must not be used for non-medical purposes such as employment or legal testing.  Clinical consideration should be applied to any drug of abuse test, particularly when unconfirmed results are used.     SALICYLATE LEVEL - Normal   AMMONIA - Normal   MAGNESIUM - Normal   TSH - Normal   OSMOLALITY, SERUM - Normal   FENTANYL, URINE - Normal    Narrative:     Negative Threshold:      Fentanyl 5 ng/mL     The normal value for the drug tested is negative. This report includes final unconfirmed screening " results to be used for medical treatment purposes only. Unconfirmed results must not be used for non-medical purposes such as employment or legal testing. Clinical consideration should be applied to any drug of abuse test, particularly when unconfirmed results are used.          BLOOD GAS, ARTERIAL   ETHANOL    Narrative:     Not for legal purposes. Chain of Custody not followed.    LACTIC ACID, REFLEX   CBC AND DIFFERENTIAL    Narrative:     The following orders were created for panel order CBC & Differential.  Procedure                               Abnormality         Status                     ---------                               -----------         ------                     CBC Auto Differential[[id number]]        Abnormal            Final result                 Please view results for these tests on the individual orders.       Meds given in ED:   Medications   potassium chloride 10 mEq in 100 mL IVPB (10 mEq Intravenous New Bag 12/9/23 1058)   levETIRAcetam in NaCl 0.75% (KEPPRA) IVPB 1,000 mg (0 mg Intravenous Stopped 12/9/23 0955)   LORazepam (ATIVAN) injection 1 mg (1 mg Intravenous Given 12/9/23 0938)   levETIRAcetam in NaCl 0.75% (KEPPRA) IVPB 1,000 mg (0 mg Intravenous Stopped 12/9/23 0948)           NIH Stroke Scale:  Interval: baseline     Isolation/Infection(s):  No active isolations   No active infections     COVID Testing  Collected .  Resulted .    Nursing report ED to floor:  Mental status: .gcs 12  Ambulatory status: .non amb  Precautions: .seizures    ED nurse phone extentsion- .. [phone]    Report to paulino spangler and care transferred CCU      Electronically signed by Sofia Hartley, RN at 12/09/23 1127       Joselyn Longo RN at 12/09/23 0916          Pt had witnessed seizure in CT by CT tech; paramedic/tech and charge RN to CT, MD aware of seizure activity;   Seizure lasted approx 20-30 seconds. VSS at this time      Electronically signed by Joselyn Longo RN at 12/09/23 0918       Carri  MD Kolton at 12/09/23 0822          Subjective   History of Present Illness  Patient with altered mental status came to the ED with level complaints via EMS.  Last well-known is under to mind at this time.  Patient follows commands but appears confused.  No focal neurological deficits.  No history any trauma.  No over dosages.    Altered Mental Status  Presenting symptoms: confusion and disorientation    Severity:  Moderate  Most recent episode:  Today  Episode history:  Single  Timing:  Constant  Progression:  Unchanged  Chronicity:  New  Context: not alcohol use, not dementia, not drug use, not head injury, not nursing home resident, not recent change in medication and not recent illness    Associated symptoms: weakness    Associated symptoms: no abdominal pain, no agitation, no bladder incontinence, no difficulty breathing, no headaches, no light-headedness, no seizures and no slurred speech        Review of Systems   Unable to perform ROS: Mental status change   Gastrointestinal:  Negative for abdominal pain.   Genitourinary:  Negative for bladder incontinence.   Neurological:  Positive for weakness. Negative for seizures, light-headedness and headaches.   Psychiatric/Behavioral:  Positive for confusion. Negative for agitation.        Past Medical History:   Diagnosis Date    Fibromyalgia     Hyperlipidemia     Hypertension     Hypothyroidism     Low back pain     Migraine     Neuropathy        Allergies   Allergen Reactions    Morphine Hives       Past Surgical History:   Procedure Laterality Date    APPENDECTOMY      HYSTERECTOMY      OOPHORECTOMY         Family History   Problem Relation Age of Onset    Cancer Mother     Diabetes Mother     Cancer Father        Social History     Socioeconomic History    Marital status: Single   Tobacco Use    Smoking status: Every Day     Packs/day: 1     Types: Cigarettes    Smokeless tobacco: Never   Substance and Sexual Activity    Alcohol use: No    Drug use: No     Sexual activity: Defer           Objective   Physical Exam  Vitals and nursing note reviewed. Exam conducted with a chaperone present.   Constitutional:       General: She is not in acute distress.     Appearance: Normal appearance. She is underweight. She is ill-appearing. She is not toxic-appearing.   HENT:      Head: Normocephalic and atraumatic.      Comments: No hemotympanum     Right Ear: Tympanic membrane normal.      Left Ear: Tympanic membrane normal.      Nose: Nose normal.      Mouth/Throat:      Mouth: Mucous membranes are moist.      Pharynx: Oropharynx is clear. No oropharyngeal exudate.   Eyes:      General: No scleral icterus.     Conjunctiva/sclera: Conjunctivae normal.      Pupils: Pupils are equal, round, and reactive to light.      Comments: No hyphema no conjunctival hemorrhages   Neck:      Vascular: No carotid bruit.      Meningeal: Brudzinski's sign and Kernig's sign absent.      Comments: No nuchal rigidity  Cardiovascular:      Rate and Rhythm: Normal rate and regular rhythm.      Pulses: Normal pulses.      Heart sounds: Normal heart sounds. No murmur heard.     No friction rub. No gallop.   Pulmonary:      Effort: Pulmonary effort is normal.      Breath sounds: Normal breath sounds. No stridor. No wheezing, rhonchi or rales.   Chest:      Chest wall: No tenderness.   Abdominal:      General: Abdomen is flat. There is no distension.      Palpations: There is no mass.      Tenderness: There is no abdominal tenderness. There is no rebound.   Musculoskeletal:         General: No swelling or tenderness. Normal range of motion.      Cervical back: Normal range of motion and neck supple. No rigidity. No muscular tenderness.      Right lower leg: No edema.      Left lower leg: No edema.   Lymphadenopathy:      Cervical: No cervical adenopathy.   Skin:     General: Skin is warm.      Capillary Refill: Capillary refill takes 2 to 3 seconds. Capillary refill takes less than 2 seconds.       Coloration: Skin is not ashen, cyanotic, jaundiced, mottled or pale.      Findings: No bruising, erythema, lesion or rash.   Neurological:      General: No focal deficit present.      Mental Status: She is alert. She is disoriented and confused.      GCS: GCS eye subscore is 4. GCS verbal subscore is 5. GCS motor subscore is 6.      Cranial Nerves: Cranial nerves 2-12 are intact. No cranial nerve deficit or facial asymmetry.      Motor: Motor function is intact. No weakness, tremor, atrophy, abnormal muscle tone or seizure activity.      Coordination: Coordination is intact. Coordination normal.      Deep Tendon Reflexes: Reflexes are normal and symmetric. Babinski sign absent on the right side. Babinski sign absent on the left side.      Reflex Scores:       Bicep reflexes are 2+ on the right side and 2+ on the left side.       Patellar reflexes are 2+ on the right side and 2+ on the left side.  Psychiatric:         Mood and Affect: Mood normal.         Speech: Speech normal.         Behavior: Behavior normal.         Thought Content: Thought content normal.         Judgment: Judgment normal.         Procedures          ED Course  ED Course as of 12/09/23 1037   Sat Dec 09, 2023   0828 Normal sinus rhythm with a prolonged QT [TS]   1023 Neurology consulted [TS]   1024 With altered mental status she has mild seizures with medication on board.  Had 2 episode of seizure with a tongue contusion was given tetanus in the ED.  Patient received Ativan and 2 g of Keppra I have discussed this case the patient's daughter she had a history of seizure when she was young but has not had any seizures since then.  There is no history any trauma.  Patient will be admitted for further evaluation and treatment. [TS]   1028 Discussed with the patient's family the patient's prognosis guarded with his multitude of seizures and altered mental status and confusion which has persisted.  Will be admitted to ICU for further evaluation  assessment. [TS]      ED Course User Index  [TS] Kolton Christina MD                          Total (NIH Stroke Scale): 5                  Medical Decision Making  Differential Diagnosis:  I considered toxic-metabolic etiology, hypoglycemia, hyperglycemia, diabetic ketoacidosis, drug overdose, ethanol intoxication, thiamine deficiency, hypothermia, hyponatremia, hypernatremia, organ failure, liver failure, kidney failure, thyroid failure, adrenal failure, hypoxia, hypercarbia, ischemic stroke, intracranial bleed, subarachnoid hemorrhage, closed head injury, subdural hematoma, seizure activity, syncopal episode, infectious etiology, hypertensive encephalopathy, vasculitis, thrombotic thrombocytopenic purpura and disseminated intravascular coagulation as a possible cause of altered mental status in this patient. This is a partial list of diagnoses considered.             Problems Addressed:  Altered mental status, unspecified altered mental status type: complicated acute illness or injury     Details: Patient will ultimately status polypharmacy on board with seizures.  Will be admitted CT of the head is negative for any acute abnormity pathology.  High anion gap metabolic acidosis: complicated acute illness or injury     Details: Probably secondary to seizure the lactic acidosis caused by that.  Hypokalemia: complicated acute illness or injury     Details: Replace with IV potassium  Lactic acidosis: complicated acute illness or injury     Details: Lactic acidosis is secondary to seizure is not related to sepsis.  Polypharmacy: complicated acute illness or injury     Details: Polypharmacy multitude of mood altering medications.  Contributing to altered mental status.  Prolonged Q-T interval on ECG: complicated acute illness or injury  Seizure disorder: complicated acute illness or injury     Details: Patient with couple of seizure episodes today    Amount and/or Complexity of Data Reviewed  Labs: ordered.     Details:  Lab workup reviewed  Radiology: ordered.     Details: Scans negative  ECG/medicine tests: ordered.     Details: Prolonged QT  Discussion of management or test interpretation with external provider(s): Discussed with neurology discussed with the pts primary MD    Risk  Prescription drug management.  Decision regarding hospitalization.  Risk Details: Patient with a seizure disorder with couple episode of seizures altered mental status polypharmacy on board.  No acute event evidence of infection.  Will be admitted to medicine service.        Final diagnoses:   Seizure disorder   Altered mental status, unspecified altered mental status type   Polypharmacy   Lactic acidosis   Hypokalemia   Prolonged Q-T interval on ECG   High anion gap metabolic acidosis       ED Disposition  ED Disposition       ED Disposition   Decision to Admit    Condition   --    Comment   Level of Care: Critical Care [6]   Diagnosis: Seizure [205090]   Admitting Physician: AVELINO LOBO [[id number]]   Attending Physician: AVELINO LOBO [[id number]]   Certification: I Certify That Inpatient Hospital Services Are Medically Necessary For Greater Than 2 Midnights                 No follow-up provider specified.       Medication List      No changes were made to your prescriptions during this visit.            Kolton Christina MD  12/09/23 0828       Kolton Christina MD  12/09/23 1030       Kolton Christina MD  12/09/23 1032       Kolton Christina MD  12/09/23 1036       Kolton Christina MD  12/09/23 1037      Electronically signed by Kolton Christina MD at 12/09/23 1037       Vital Signs (last day)       Date/Time Temp Temp src Pulse Resp BP Patient Position SpO2    12/11/23 1138 97.8 (36.6) Oral 91 18 149/72 Sitting 96    12/11/23 0853 97.5 (36.4) Oral 99 18 129/60 Lying 99    12/11/23 0412 97.7 (36.5) Oral 64 18 126/62 Lying 96    12/11/23 0009 -- -- -- -- 90/62 Lying --    12/10/23 2302 97.7 (36.5) Oral 54 18 87/56 Lying 96    12/10/23 1851 98 (36.7) Oral 82 18  142/70 Lying 96    12/10/23 1730 -- -- 73 -- 107/72 -- 93    12/10/23 1700 -- -- 77 -- 119/68 -- 95    12/10/23 1630 -- -- 76 -- 120/63 -- 93    12/10/23 1600 -- -- 63 -- 137/73 -- 95    12/10/23 1530 -- -- 80 -- 124/84 -- 92    12/10/23 1300 -- -- 70 -- 92/66 -- 93    12/10/23 1200 98.2 (36.8) Oral 86 20 150/86 Lying 95    12/10/23 1100 -- -- 74 -- 119/81 -- 89    12/10/23 1000 -- -- 62 -- 119/72 -- 99    12/10/23 0930 -- -- 64 -- 122/75 -- 94    12/10/23 0900 98 (36.7) Oral 66 22 109/62 Lying 96    12/10/23 0600 -- -- 49 -- 117/79 -- 96    12/10/23 0500 -- -- 57 -- 137/91 -- 95    12/10/23 0400 97.8 (36.6) Axillary 53 -- 123/70 -- 98    12/10/23 0300 -- -- 60 -- 105/68 -- 95    12/10/23 0200 -- -- 71 -- 126/64 -- 97    12/10/23 0100 -- -- 59 -- 97/69 -- 95    12/10/23 0000 97.6 (36.4) Axillary 61 -- 101/60 -- 95          Current Facility-Administered Medications   Medication Dose Route Frequency Provider Last Rate Last Admin    acetaminophen (TYLENOL) tablet 650 mg  650 mg Oral Q4H PRN Amol Molina MD   650 mg at 12/10/23 1121    ARIPiprazole (ABILIFY) tablet 10 mg  10 mg Oral Q24H Akanksha Mckeon MD   10 mg at 12/11/23 1325    sennosides-docusate (PERICOLACE) 8.6-50 MG per tablet 2 tablet  2 tablet Oral BID Amol Molina MD        And    polyethylene glycol (MIRALAX) packet 17 g  17 g Oral Daily PRN Amol Molina MD        And    bisacodyl (DULCOLAX) EC tablet 5 mg  5 mg Oral Daily PRN Amol Molina MD        And    bisacodyl (DULCOLAX) suppository 10 mg  10 mg Rectal Daily PRN Amol Molina MD        butalbital-aspirin-caffeine-codeine (FIORINAL WITH CODEINE) -15-30 MG per capsule 1 capsule  1 capsule Oral Q4H PRN Amol Molina MD   1 capsule at 12/11/23 0822    Calcium Replacement - Follow Nurse / BPA Driven Protocol   Does not apply PRN Amol Molina MD        clonazePAM (KlonoPIN) tablet 0.5 mg  0.5 mg Oral Q12H Borte, Akanksha ALVARADO MD   0.5 mg at  23 0813    Enoxaparin Sodium (LOVENOX) syringe 40 mg  40 mg Subcutaneous Q24H Amol Molina MD   40 mg at 23 1409    gabapentin (NEURONTIN) capsule 600 mg  600 mg Oral Q8H Amol Molina MD   600 mg at 23 1325    levETIRAcetam (KEPPRA) tablet 500 mg  500 mg Oral BID Jeanne Huston, APRCHRISTIANO        levothyroxine (SYNTHROID, LEVOTHROID) tablet 88 mcg  88 mcg Oral Q AM Amol Molina MD   88 mcg at 23 0542    lisinopril (PRINIVIL,ZESTRIL) tablet 5 mg  5 mg Oral Daily Amol Molina MD   5 mg at 23 0814    Magnesium Standard Dose Replacement - Follow Nurse / BPA Driven Protocol   Does not apply PRN Amol Molina MD        oxyCODONE-acetaminophen (PERCOCET)  MG per tablet 1 tablet  1 tablet Oral Q8H PRN Amol Molina MD   1 tablet at 12/10/23 2008    Phosphorus Replacement - Follow Nurse / BPA Driven Protocol   Does not apply PRN Amol Molina MD        Potassium Replacement - Follow Nurse / BPA Driven Protocol   Does not apply PRN Amol Molina MD        pravastatin (PRAVACHOL) tablet 20 mg  20 mg Oral Q24H Amol Molina MD   20 mg at 23 1325    rOPINIRole (REQUIP) tablet 2 mg  2 mg Oral Nightly Amol Molina MD   2 mg at 12/10/23 2008    sertraline (ZOLOFT) tablet 25 mg  25 mg Oral Daily Akanksha Mckeon MD   25 mg at 23 0813    sodium chloride 0.9 % flush 10 mL  10 mL Intravenous Q12H Amol Molina MD   10 mL at 23 0815    sodium chloride 0.9 % flush 10 mL  10 mL Intravenous PRN Amol Molina MD        sodium chloride 0.9 % infusion 40 mL  40 mL Intravenous PRN Amol Molina MD            Physician Progress Notes (last 48 hours)        Jeanne Huston, APRN at 23 1105          NEUROLOGY FOLLOW-UP     DOS: 2023  NAME: Destiny Morelos   : 1965  PCP: London Rascon MD  CC: Stroke  Referring MD: No ref. provider found    Neurological Problem and Interval  History:  58 y.o. female with psychiatric conditions, polypharmacy and h/o childhood seiure presents with chief complaint of AMS and witnessed seizure. Patient has a history of childhood seizures as well as a family history of seizures. She has polypharmacy and appeared to be feeling unwell x1 week with GI symptoms and told her daughter she was out of her medications. Unclear if she was unwell and then ran out of certain meds or if her symptoms were from withdrawal. She had a witnessed seizure and was loaded with keppra. She was drowsy and post-ictal but neurologically non-focal. Daughter at bedside notes that she has had a lot of unintentional weight loss. Daughter also notes that she has a history of taking her medications incorrectly or too much.Patient was found to have elevated LFTs, low potassium, elevated  lactate, and leukocytosis with temperature 100.3 (has received tylenol). She ahs had recent exposure to strep throat. She is not on AEDs.  Patient will may have an underlying seizure disorder and this may represent a breakthrough seizure in the setting of possible illness versus medication withdrawal.  However, to be cautious we will also continue to entertain other etiologies such as serotonin syndrome or less likely NMS.  Patient has extensive polypharmacy and from interviewing both the patient and both daughters the patient is fairly unreliable at this current time, it appears that patient has a tendency to overtake amounts of medications.  Given her UDS is unremarkable however and concern for benzodiazepine withdrawal.  Defer to the primary team for further investigation given her recent weight loss as well as infectious evaluation.    12/10: Patient found to be COVID positive, OK to resume home medications, continue keppra, await EEG, MRI is unremarkable. May have underlying seizure disorder given history of childhood seizures with breakthrough in the setting of infection vs infection and seizures due  to withdrawal of home medications (benzos and barbiturates).      12/11: sitting up in bed. No family at bedside. No seizures reported or observed. She has been tolerating Keppra 500 mg IV BID. Will change to oral.  She tells me she ran out of her medications and not able to get refills and she had not kept her f/u appointments. Patient lives with her daughter and grand daughter.  She has lost about 60 pounds over the last year and will defer this to her PCP.  Patient has c/o abdominal pain but when asked patient location, she points to lower sternum.     Ck 803 down form 1074.  K+ 3.7 up from 2.3 on admission.       Medications On Admission  Medications Prior to Admission   Medication Sig Dispense Refill Last Dose    clonazePAM (KlonoPIN) 0.5 MG tablet Take 1 tablet by mouth 3 (Three) Times a Day.       gabapentin (NEURONTIN) 800 MG tablet Take 1 tablet by mouth 3 (Three) Times a Day.       levothyroxine (SYNTHROID, LEVOTHROID) 88 MCG tablet Take 1 tablet by mouth Daily.       rOPINIRole (REQUIP) 4 MG tablet Take 1 tablet by mouth Every Night.       ARIPiprazole (ABILIFY) 10 MG tablet Take 1 tablet by mouth Daily.       butalbital-aspirin-caffeine-codeine (FIORINAL WITH CODEINE) -37-30 MG capsule Take 1 capsule by mouth Every 6 (Six) Hours As Needed for Headache.       carisoprodol (SOMA) 350 MG tablet Take 1 tablet by mouth 3 (Three) Times a Day As Needed for Muscle Spasms.       lisinopril (PRINIVIL,ZESTRIL) 5 MG tablet Take 1 tablet by mouth Daily.       oxyCODONE-acetaminophen (PERCOCET)  MG per tablet Take 1 tablet by mouth Every 6 (Six) Hours As Needed.       pravastatin (PRAVACHOL) 20 MG tablet Take 1 tablet by mouth Daily.       Vraylar 3 MG capsule capsule Take 1 capsule by mouth Daily.       zolpidem CR (AMBIEN CR) 12.5 MG CR tablet Take 1 tablet by mouth At Night As Needed.            Laboratory Results:   Lab Results   Component Value Date    GLUCOSE 82 12/10/2023    CALCIUM 8.0 (L)  "12/10/2023     12/10/2023    K 3.7 12/10/2023    CO2 27.0 12/10/2023    CL 99 12/10/2023    BUN 10 12/10/2023    CREATININE 0.38 (L) 12/10/2023    EGFRIFNONA 127 06/19/2019    BCR 26.3 (H) 12/10/2023    ANIONGAP 10.0 12/10/2023     Lab Results   Component Value Date    WBC 9.68 12/10/2023    HGB 12.4 12/10/2023    HCT 37.4 12/10/2023    MCV 99.2 (H) 12/10/2023     12/10/2023     No results found for: \"CHOL\"  No results found for: \"HDL\"  No results found for: \"LDL\"  No results found for: \"TRIG\"  No results found for: \"HGBA1C\"  Lab Results   Component Value Date    INR 0.82 (L) 12/09/2023    PROTIME 11.3 (L) 12/09/2023       EEG    Result Date: 12/10/2023  Diffuse cerebral dysfunction of mild degree but nonspecific No ongoing seizures are seen This report is transcribed using the Dragon dictation system.      MRI Brain With & Without Contrast    Result Date: 12/10/2023  1. No acute intracranial abnormality.  This report was signed and finalized on 12/10/2023 3:39 PM by Dr. Juan Daniel Millard MD.      XR Chest 1 View    Result Date: 12/9/2023   No acute cardiopulmonary abnormality.    This report was signed and finalized on 12/9/2023 9:58 AM by Shorty Ricci.      CT Head Without Contrast    Result Date: 12/9/2023  Impression:   No acute intracranial abnormality.  This report was signed and finalized on 12/9/2023 9:34 AM by Shorty Ricci.          Physical Examination:   /60 (BP Location: Left arm, Patient Position: Lying)   Pulse 99   Temp 97.5 °F (36.4 °C) (Oral)   Resp 18   Ht 152.4 cm (60\")   Wt 59 kg (130 lb)   SpO2 99%   BMI 25.39 kg/m²         General Exam:  Patient looks thin and frail.  Head:  Normocephalic, atraumatic  HEENT:  Neck supple  Fundoscopic Exam:  No signs of disc edema  CVS:  Regular rate and rhythm.  No murmurs  Carotid Examination:  No bruits  Lungs:  Clear to auscultation  Abdomen:  Nontender, nondistended  Extremities:  No signs of peripheral edema  Skin:  No " irais    Neurologic Exam:    Mental Status:    -Alert, Oriented X 3  -No word-finding difficulties  -No aphasia  -No dysarthria  -Follows simple and complex commands    CN II:  Visual fields full.  Pupils equally reactive to light  CN III, IV, VI:  Extraocular Muscles full with no signs of nystagmus  CN V:  Facial sensory is symmetric with no asymmetries.  CN VII:  Facial motor symmetric  CN VIII:  Gross hearing intact bilaterally  CN IX:  Palate elevates symmetrically  CN X:  Palate elevates symmetrically  CN XI:  Shoulder shrug symmetric  CN XII:  Tongue protrudes to midline  Motor: (strength out of 5:  1= minimal movement, 2 = movement in plane of gravity, 3 = movement against gravity, 4 = movement against some resistance, 5 = full strength)    -Right Upper Ext: Proximal: 5 Distal: 5  -Left Upper Ext: Proximal: 5 Distal: 5    -Right Lower Ext: Proximal: 5 Distal: 5  -Left Lower Ext: Proximal: 5 Distal: 5    DTR:  -Right   Bicep: 2+ Tricep: 2+ Brachoradialis: 2+   Patella: 2+ Ankle: 2+ Neg Babinski  -Left   Bicep: 2+ Tricep: 2+ Brachoradialis: 2+   Patella: 2+ Ankle: 2+ Neg Babinski    Sensory:  -Intact to light touch, pinprick, temperature, pain, and proprioception    Coordination:  -Finger to nose intact  -Heel to shin intact      Gait  -not tested for safety reasons.            Impression/Plan:   58 y.o. female with psychiatric conditions, polypharmacy and h/o childhood seiure presents with chief complaint of AMS and witnessed seizure.      Unclear if this was breakthrough seizure in the setting of underlying seizure disorder or triggered by illness or by medication overuse/underused and withdrawal.    -12/10: OK to resume home medications, continue keppra, await EEG, MRI is unremarkable. May have underlying seizure disorder given history of childhood seizures with breakthrough in the setting of infection vs infection and seizures due to withdrawal of home medications (benzos and barbiturates).       -OK to  restart Abilify, Zoloft     -Currently, continue Requip, gabapentin.      -schedule clonazepam 0.5 mg BID     -unintentional weight loss evaluation deferred to the primary team, likely an outpatient evaluation by her PCP.     -Patient will may have an underlying seizure disorder and this may represent a breakthrough seizure in the setting of possible illness versus medication withdrawal.       -Given her UDS is unremarkable however and concern for benzodiazepine/barbiturate withdrawal.         -MRI brain w/wo unremarkable     -EEG       -repeat CK, is trending down joseph.   Nutrition consult for unintentional weight loss and malnutrition assessment. Defer further workup to primary team.    will change IV Keppra to  mg BID.   Patient will need f/u in neurology clinic. Would also like to address  patient HA history as outpatient. I would recommend to taper off Fioricet and transition to other HA prevention medication.      -Seizure Precautions: Patient is not to drive for at least 3 months until cleared by a physician, operate heavy machinery, take tub baths, swim unattended, climb heights, or perform any other activities that can bring harm to himself/herself or others during an episode of altered awareness.    I have discussed the above with the patient   Time spent with patient: 30min         SHEILA Sams   Neurology                   Electronically signed by Jeanne Huston APRN at 23 [phone]       Akanksha Mckeon MD at 12/10/23 1489          NEUROLOGY FOLLOW-UP     DOS: 12/10/2023  NAME: Destiny Morelos   : 1965  PCP: London Rascon MD  CC: Stroke  Referring MD: No ref. provider found    Neurological Problem and Interval History:  58 y.o. female with psychiatric conditions, polypharmacy and h/o childhood seiure presents with chief complaint of AMS and witnessed seizure. Patient has a history of childhood seizures as well as a family history of seizures. She has polypharmacy  and appeared to be feeling unwell x1 week with GI symptoms and told her daughter she was out of her medications. Unclear if she was unwell and then ran out of certain meds or if her symptoms were from withdrawal. She had a witnessed seizure and was loaded with keppra. She was drowsy and post-ictal but neurologically non-focal. Daughter at bedside notes that she has had a lot of unintentional weight loss. Daughter also notes that she has a history of taking her medications incorrectly or too much.Patient was found to have elevated LFTs, low potassium, elevated  lactate, and leukocytosis with temperature 100.3 (has received tylenol). She ahs had recent exposure to strep throat. She is not on AEDs.  Patient will may have an underlying seizure disorder and this may represent a breakthrough seizure in the setting of possible illness versus medication withdrawal.  However, to be cautious we will also continue to entertain other etiologies such as serotonin syndrome or less likely NMS.  Patient has extensive polypharmacy and from interviewing both the patient and both daughters the patient is fairly unreliable at this current time, it appears that patient has a tendency to overtake amounts of medications.  Given her UDS is unremarkable however and concern for benzodiazepine withdrawal.  Defer to the primary team for further investigation given her recent weight loss as well as infectious evaluation.    12/10: Patient found to be COVID positive, OK to resume home medications, continue keppra, await EEG, MRI is unremarkable. May have underlying seizure disorder given history of childhood seizures with breakthrough in the setting of infection vs infection and seizures due to withdrawal of home medications (benzos and barbiturates).      Medications On Admission  Medications Prior to Admission   Medication Sig Dispense Refill Last Dose    clonazePAM (KlonoPIN) 0.5 MG tablet Take 1 tablet by mouth 3 (Three) Times a Day.        "gabapentin (NEURONTIN) 800 MG tablet Take 1 tablet by mouth 3 (Three) Times a Day.       levothyroxine (SYNTHROID, LEVOTHROID) 88 MCG tablet Take 1 tablet by mouth Daily.       rOPINIRole (REQUIP) 4 MG tablet Take 1 tablet by mouth Every Night.       ARIPiprazole (ABILIFY) 10 MG tablet Take 1 tablet by mouth Daily.       butalbital-aspirin-caffeine-codeine (FIORINAL WITH CODEINE) -29-30 MG capsule Take 1 capsule by mouth Every 6 (Six) Hours As Needed for Headache.       carisoprodol (SOMA) 350 MG tablet Take 1 tablet by mouth 3 (Three) Times a Day As Needed for Muscle Spasms.       lisinopril (PRINIVIL,ZESTRIL) 5 MG tablet Take 1 tablet by mouth Daily.       oxyCODONE-acetaminophen (PERCOCET)  MG per tablet Take 1 tablet by mouth Every 6 (Six) Hours As Needed.       pravastatin (PRAVACHOL) 20 MG tablet Take 1 tablet by mouth Daily.       Vraylar 3 MG capsule capsule Take 1 capsule by mouth Daily.       zolpidem CR (AMBIEN CR) 12.5 MG CR tablet Take 1 tablet by mouth At Night As Needed.            Laboratory Results:   Lab Results   Component Value Date    GLUCOSE 82 12/10/2023    CALCIUM 8.0 (L) 12/10/2023     12/10/2023    K 3.7 12/10/2023    CO2 27.0 12/10/2023    CL 99 12/10/2023    BUN 10 12/10/2023    CREATININE 0.38 (L) 12/10/2023    EGFRIFNONA 127 06/19/2019    BCR 26.3 (H) 12/10/2023    ANIONGAP 10.0 12/10/2023     Lab Results   Component Value Date    WBC 9.68 12/10/2023    HGB 12.4 12/10/2023    HCT 37.4 12/10/2023    MCV 99.2 (H) 12/10/2023     12/10/2023     No results found for: \"CHOL\"  No results found for: \"HDL\"  No results found for: \"LDL\"  No results found for: \"TRIG\"  No results found for: \"HGBA1C\"  Lab Results   Component Value Date    INR 0.82 (L) 12/09/2023    PROTIME 11.3 (L) 12/09/2023         Physical Examination:   BP 92/66   Pulse 70   Temp 98.2 °F (36.8 °C) (Oral)   Resp 20   Ht 152.4 cm (60\")   Wt 59 kg (130 lb)   SpO2 93%   BMI 25.39 kg/m²     Patient is " drowsy but arouses with stimulation and is oriented to self and follows commands patient has no obvious tremor has some hyperreflexia in the bilateral lower extremities no spontaneous clonus no inducible clonus patient does have cranial nerves intact.  Overall patient is neurologically nonfocal.     Impression/Plan:   58 y.o. female with psychiatric conditions, polypharmacy and h/o childhood seiure presents with chief complaint of AMS and witnessed seizure.      Unclear if this was breakthrough seizure in the setting of underlying seizure disorder or triggered by illness or by medication overuse/underused and withdrawal.    -12/10: Patient found to be COVID positive, OK to resume home medications, continue keppra, await EEG, MRI is unremarkable. May have underlying seizure disorder given history of childhood seizures with breakthrough in the setting of infection vs infection and seizures due to withdrawal of home medications (benzos and barbiturates).       -OK to restart Abilify, Zoloft     -Currently, continue Requip, GBP     -schedule clonazepam 0.5 mg BID     -unintentional weight loss evaluation deferred to the primary team, likely an outpatient evaluation by her PCP.     -Patient will may have an underlying seizure disorder and this may represent a breakthrough seizure in the setting of possible illness versus medication withdrawal.       -Given her UDS is unremarkable however and concern for benzodiazepine/barbiturate withdrawal.       -COVID     -MRI brain w/wo unremarkable     -EEG      -repeat CK     -Seizure Precautions: Patient is not to drive for at least 3 months until cleared by a physician, operate heavy machinery, take tub baths, swim unattended, climb heights, or perform any other activities that can bring harm to himself/herself or others during an episode of altered awareness.    I have discussed the above with the patient   Time spent with patient: 30min    GONZALO  Reviewed: previous chart, nursing  note and vitals  Reviewed previous: labs, MRI and CT scan  Interpretation: labs, MRI and CT scan  Total time providing critical care: 30-74 minutes. This excludes time spent performing separately reportable procedures and services.  Consults: neurology         Akanksha Mckeon MD   Neurology                   Electronically signed by Akanksha Mckeon MD at 12/10/23 1705       Amol Molina MD at 12/10/23 0901            Daily Progress Note  Destiny Morelos  MRN: [id number] LOS: 1    Admit Date: 12/9/2023   12/10/2023 09:02 CST    Subjective:          Chief Complaint:  Chief Complaint   Patient presents with    Altered Mental Status       Interval History:    Reviewed overnight events and nursing notes.   Patient resting company bed 20.  No further seizures overnight.    Review of Systems   Constitutional:  Negative for chills and fever.   Respiratory:  Negative for shortness of breath.    Neurological:  Negative for seizures.       DIET:  Diet: Regular/House Diet; Texture: Regular Texture (IDDSI 7); Fluid Consistency: Thin (IDDSI 0)    Medications:      [Held by provider] ARIPiprazole, 10 mg, Oral, Q24H  Chlorhexidine Gluconate Cloth, 1 application , Topical, Once  Chlorhexidine Gluconate Cloth, 1 application , Topical, Q24H  clonazePAM, 0.5 mg, Oral, Q12H  enoxaparin, 40 mg, Subcutaneous, Q24H  gabapentin, 600 mg, Oral, Q8H  levETIRAcetam, 500 mg, Intravenous, Q12H  levothyroxine, 88 mcg, Oral, Q AM  lisinopril, 5 mg, Oral, Daily  mupirocin, 1 application , Each Nare, BID  pravastatin, 20 mg, Oral, Q24H  rOPINIRole, 2 mg, Oral, Nightly  senna-docusate sodium, 2 tablet, Oral, BID  [Held by provider] sertraline, 25 mg, Oral, Daily  sodium chloride, 10 mL, Intravenous, Q12H        Data:     Code Status:   Code Status and Medical Interventions:   Ordered at: 12/09/23 1217     Code Status (Patient has no pulse and is not breathing):    CPR (Attempt to Resuscitate)     Medical Interventions (Patient has pulse  or is breathing):    Full Support       Family History   Problem Relation Age of Onset    Cancer Mother     Diabetes Mother     Cancer Father      Social History     Socioeconomic History    Marital status: Single   Tobacco Use    Smoking status: Every Day     Packs/day: 1     Types: Cigarettes    Smokeless tobacco: Never   Vaping Use    Vaping Use: Never used   Substance and Sexual Activity    Alcohol use: No    Drug use: No    Sexual activity: Defer       Labs:    Lab Results (last 72 hours)       Procedure Component Value Units Date/Time    Potassium [[id number]]  (Normal) Collected: 12/10/23 0756    Specimen: Blood Updated: 12/10/23 0844     Potassium 3.7 mmol/L     Comprehensive Metabolic Panel [[id number]]  (Abnormal) Collected: 12/10/23 0307    Specimen: Blood Updated: 12/10/23 0356     Glucose 82 mg/dL      BUN 10 mg/dL      Creatinine 0.38 mg/dL      Sodium 136 mmol/L      Potassium 3.3 mmol/L      Chloride 99 mmol/L      CO2 27.0 mmol/L      Calcium 8.0 mg/dL      Total Protein 5.6 g/dL      Albumin 3.3 g/dL      ALT (SGPT) 28 U/L      AST (SGOT) 33 U/L      Alkaline Phosphatase 81 U/L      Total Bilirubin 0.5 mg/dL      Globulin 2.3 gm/dL      A/G Ratio 1.4 g/dL      BUN/Creatinine Ratio 26.3     Anion Gap 10.0 mmol/L      eGFR 117.0 mL/min/1.73     Narrative:      GFR Normal >60  Chronic Kidney Disease <60  Kidney Failure <15      CBC Auto Differential [[id number]]  (Abnormal) Collected: 12/10/23 0307    Specimen: Blood Updated: 12/10/23 0332     WBC 9.68 10*3/mm3      RBC 3.77 10*6/mm3      Hemoglobin 12.4 g/dL      Hematocrit 37.4 %      MCV 99.2 fL      MCH 32.9 pg      MCHC 33.2 g/dL      RDW 12.4 %      RDW-SD 45.3 fl      MPV 9.8 fL      Platelets 256 10*3/mm3      Neutrophil % 51.7 %      Lymphocyte % 38.3 %      Monocyte % 8.3 %      Eosinophil % 0.9 %      Basophil % 0.4 %      Immature Grans % 0.4 %      Neutrophils, Absolute 5.00 10*3/mm3      Lymphocytes, Absolute 3.71 10*3/mm3      Monocytes,  Absolute 0.80 10*3/mm3      Eosinophils, Absolute 0.09 10*3/mm3      Basophils, Absolute 0.04 10*3/mm3      Immature Grans, Absolute 0.04 10*3/mm3      nRBC 0.0 /100 WBC     Basic Metabolic Panel [[id number]]  (Abnormal) Collected: 12/10/23 0012    Specimen: Blood Updated: 12/10/23 0045     Glucose 91 mg/dL      BUN 11 mg/dL      Creatinine 0.34 mg/dL      Sodium 135 mmol/L      Potassium 3.9 mmol/L      Chloride 99 mmol/L      CO2 27.0 mmol/L      Calcium 7.9 mg/dL      BUN/Creatinine Ratio 32.4     Anion Gap 9.0 mmol/L      eGFR 120.2 mL/min/1.73     Narrative:      GFR Normal >60  Chronic Kidney Disease <60  Kidney Failure <15      CK [[id number]]  (Abnormal) Collected: 12/09/23 1641    Specimen: Blood Updated: 12/09/23 1803     Creatine Kinase 1,074 U/L     Basic Metabolic Panel [[id number]]  (Abnormal) Collected: 12/09/23 1641    Specimen: Blood Updated: 12/09/23 1740     Glucose 94 mg/dL      BUN 13 mg/dL      Creatinine 0.42 mg/dL      Sodium 136 mmol/L      Potassium 2.5 mmol/L      Chloride 96 mmol/L      CO2 28.0 mmol/L      Calcium 8.0 mg/dL      BUN/Creatinine Ratio 31.0     Anion Gap 12.0 mmol/L      eGFR 114.3 mL/min/1.73     Narrative:      GFR Normal >60  Chronic Kidney Disease <60  Kidney Failure <15      Phosphorus [[id number]]  (Normal) Collected: 12/09/23 1641    Specimen: Blood Updated: 12/09/23 1738     Phosphorus 2.6 mg/dL     STAT Lactic Acid, Reflex [[id number]]  (Normal) Collected: 12/09/23 1159    Specimen: Blood Updated: 12/09/23 1311     Lactate 1.9 mmol/L     Urine Drug Screen - Urine, Clean Catch [[id number]]  (Normal) Collected: 12/09/23 0903    Specimen: Urine, Clean Catch Updated: 12/09/23 0955     THC, Screen, Urine Negative     Phencyclidine (PCP), Urine Negative     Cocaine Screen, Urine Negative     Methamphetamine, Ur Negative     Opiate Screen Negative     Amphetamine Screen, Urine Negative     Benzodiazepine Screen, Urine Negative     Tricyclic Antidepressants Screen Negative      Methadone Screen, Urine Negative     Barbiturates Screen, Urine Negative     Oxycodone Screen, Urine Negative     Buprenorphine, Screen, Urine Negative    Narrative:      Cutoff For Drugs Screened:    Amphetamines               500 ng/ml  Barbiturates               200 ng/ml  Benzodiazepines            150 ng/ml  Cocaine                    150 ng/ml  Methadone                  200 ng/ml  Opiates                    100 ng/ml  Phencyclidine               25 ng/ml  THC                         50 ng/ml  Methamphetamine            500 ng/ml  Tricyclic Antidepressants  300 ng/ml  Oxycodone                  100 ng/ml  Buprenorphine               10 ng/ml    The normal value for all drugs tested is negative. This report includes unconfirmed screening results, with the cutoff values listed, to be used for medical treatment purposes only.  Unconfirmed results must not be used for non-medical purposes such as employment or legal testing.  Clinical consideration should be applied to any drug of abuse test, particularly when unconfirmed results are used.      Fentanyl, Urine - Urine, Clean Catch [[id number]]  (Normal) Collected: 12/09/23 0903    Specimen: Urine, Clean Catch Updated: 12/09/23 0946     Fentanyl, Urine Negative    Narrative:      Negative Threshold:      Fentanyl 5 ng/mL     The normal value for the drug tested is negative. This report includes final unconfirmed screening results to be used for medical treatment purposes only. Unconfirmed results must not be used for non-medical purposes such as employment or legal testing. Clinical consideration should be applied to any drug of abuse test, particularly when unconfirmed results are used.           Osmolality, Serum [[id number]]  (Normal) Collected: 12/09/23 0831    Specimen: Blood Updated: 12/09/23 0944     Osmolality 281 mOsm/kg     Comprehensive Metabolic Panel [[id number]]  (Abnormal) Collected: 12/09/23 0831    Specimen: Blood Updated: 12/09/23 0937     Glucose  "120 mg/dL      BUN 15 mg/dL      Creatinine 0.80 mg/dL      Sodium 133 mmol/L      Potassium 2.3 mmol/L      Comment: Slight hemolysis detected by analyzer. Result may be falsely elevated.        Chloride 86 mmol/L      CO2 26.0 mmol/L      Calcium 9.3 mg/dL      Total Protein 7.6 g/dL      Albumin 4.5 g/dL      ALT (SGPT) 41 U/L      AST (SGOT) 40 U/L      Alkaline Phosphatase 116 U/L      Total Bilirubin 0.6 mg/dL      Globulin 3.1 gm/dL      A/G Ratio 1.5 g/dL      BUN/Creatinine Ratio 18.8     Anion Gap 21.0 mmol/L      eGFR 86.1 mL/min/1.73     Narrative:      GFR Normal >60  Chronic Kidney Disease <60  Kidney Failure <15      Phosphorus [[id number]]  (Abnormal) Collected: 12/09/23 0831    Specimen: Blood Updated: 12/09/23 0937     Phosphorus 1.9 mg/dL     Lactic Acid, Plasma [[id number]]  (Abnormal) Collected: 12/09/23 0831    Specimen: Blood Updated: 12/09/23 0937     Lactate 8.7 mmol/L     Magnesium [[id number]]  (Normal) Collected: 12/09/23 0831    Specimen: Blood Updated: 12/09/23 0936     Magnesium 2.6 mg/dL     TSH [[id number]]  (Normal) Collected: 12/09/23 0831    Specimen: Blood Updated: 12/09/23 0933     TSH 1.260 uIU/mL     Procalcitonin [[id number]]  (Normal) Collected: 12/09/23 0831    Specimen: Blood Updated: 12/09/23 0932     Procalcitonin 0.08 ng/mL     Narrative:      As a Marker for Sepsis (Non-Neonates):    1. <0.5 ng/mL represents a low risk of severe sepsis and/or septic shock.  2. >2 ng/mL represents a high risk of severe sepsis and/or septic shock.    As a Marker for Lower Respiratory Tract Infections that require antibiotic therapy:    PCT on Admission    Antibiotic Therapy       6-12 Hrs later    >0.5                Strongly Recommended  >0.25 - <0.5        Recommended   0.1 - 0.25          Discouraged              Remeasure/reassess PCT  <0.1                Strongly Discouraged     Remeasure/reassess PCT    As 28 day mortality risk marker: \"Change in Procalcitonin Result\" (>80% or <=80%) if " Day 0 (or Day 1) and Day 4 values are available. Refer to http://www.[facility]-pct-calculator.com    Change in PCT <=80%  A decrease of PCT levels below or equal to 80% defines a positive change in PCT test result representing a higher risk for 28-day all-cause mortality of patients diagnosed with severe sepsis for septic shock.    Change in PCT >80%  A decrease of PCT levels of more than 80% defines a negative change in PCT result representing a lower risk for 28-day all-cause mortality of patients diagnosed with severe sepsis or septic shock.       Urinalysis, Microscopic Only - Urine, Catheter [[id number]]  (Abnormal) Collected: 12/09/23 0903    Specimen: Urine, Catheter Updated: 12/09/23 0931     RBC, UA None Seen /HPF      WBC, UA 0-2 /HPF      Comment: Urine culture not indicated.        Bacteria, UA Trace /HPF      Squamous Epithelial Cells, UA 3-6 /HPF      Hyaline Casts, UA 0-2 /LPF      Methodology Manual Light Microscopy    Salicylate Level [[id number]]  (Normal) Collected: 12/09/23 0831    Specimen: Blood Updated: 12/09/23 0927     Salicylate <0.3 mg/dL     C-reactive Protein [[id number]]  (Abnormal) Collected: 12/09/23 0831    Specimen: Blood Updated: 12/09/23 0927     C-Reactive Protein 4.96 mg/dL     Urinalysis With Culture If Indicated - Urine, Catheter [[id number]]  (Abnormal) Collected: 12/09/23 0903    Specimen: Urine, Catheter Updated: 12/09/23 0927     Color, UA Dark Yellow     Appearance, UA Clear     pH, UA 5.5     Specific Gravity, UA 1.021     Glucose, UA Negative     Ketones, UA 15 mg/dL (1+)     Bilirubin, UA Small (1+)     Blood, UA Trace     Protein, UA 30 mg/dL (1+)     Leuk Esterase, UA Trace     Nitrite, UA Negative     Urobilinogen, UA 1.0 E.U./dL    Narrative:      In absence of clinical symptoms, the presence of pyuria, bacteria, and/or nitrites on the urinalysis result does not correlate with infection.    Acetaminophen Level [[id number]]  (Normal) Collected: 12/09/23 0831    Specimen:  Blood Updated: 12/09/23 0926     Acetaminophen <5.0 mcg/mL     Ammonia [[id number]]  (Normal) Collected: 12/09/23 0831    Specimen: Blood Updated: 12/09/23 0923     Ammonia 47 umol/L     Ethanol [[id number]] Collected: 12/09/23 0831    Specimen: Blood Updated: 12/09/23 0922     Ethanol % <0.010 %     Narrative:      Not for legal purposes. Chain of Custody not followed.     Protime-INR [[id number]]  (Abnormal) Collected: 12/09/23 0831    Specimen: Blood Updated: 12/09/23 0854     Protime 11.3 Seconds      INR 0.82    CBC & Differential [[id number]]  (Abnormal) Collected: 12/09/23 0831    Specimen: Blood Updated: 12/09/23 0843    Narrative:      The following orders were created for panel order CBC & Differential.  Procedure                               Abnormality         Status                     ---------                               -----------         ------                     CBC Auto Differential[[id number]]        Abnormal            Final result                 Please view results for these tests on the individual orders.    CBC Auto Differential [[id number]]  (Abnormal) Collected: 12/09/23 0831    Specimen: Blood Updated: 12/09/23 0843     WBC 13.97 10*3/mm3      RBC 4.49 10*6/mm3      Hemoglobin 14.9 g/dL      Hematocrit 43.1 %      MCV 96.0 fL      MCH 33.2 pg      MCHC 34.6 g/dL      RDW 12.3 %      RDW-SD 43.6 fl      MPV 10.2 fL      Platelets 355 10*3/mm3      Neutrophil % 82.3 %      Lymphocyte % 9.7 %      Monocyte % 7.1 %      Eosinophil % 0.1 %      Basophil % 0.2 %      Immature Grans % 0.6 %      Neutrophils, Absolute 11.50 10*3/mm3      Lymphocytes, Absolute 1.36 10*3/mm3      Monocytes, Absolute 0.99 10*3/mm3      Eosinophils, Absolute 0.01 10*3/mm3      Basophils, Absolute 0.03 10*3/mm3      Immature Grans, Absolute 0.08 10*3/mm3      nRBC 0.0 /100 WBC     Blood Gas, Arterial - [[id number]]  (Abnormal) Collected: 12/09/23 0837    Specimen: Arterial Blood Updated: 12/09/23 0835     Site Right  "Radial     Kevin's Test Positive     pH, Arterial 7.472 pH units      Comment: 83 Value above reference range        pCO2, Arterial 42.1 mm Hg      pO2, Arterial 62.0 mm Hg      Comment: 84 Value below reference range        HCO3, Arterial 30.8 mmol/L      Comment: 83 Value above reference range        Base Excess, Arterial 6.4 mmol/L      Comment: 83 Value above reference range        O2 Saturation, Arterial 93.9 %      Comment: 84 Value below reference range        Temperature 37.0     Barometric Pressure for Blood Gas 747 mmHg      Modality Nasal Cannula     Flow Rate 2.0 lpm      Ventilator Mode NA     Collected by [id number]     Comment: Meter: [id number]     :  [id number]        pCO2, Temperature Corrected 42.1 mm Hg      pH, Temp Corrected 7.472 pH Units      pO2, Temperature Corrected 62.0 mm Hg               Objective:     Vitals: /79   Pulse (!) 49   Temp 97.8 °F (36.6 °C) (Axillary)   Resp 18   Ht 152.4 cm (60\")   Wt 59 kg (130 lb)   SpO2 96%   BMI 25.39 kg/m²    Intake/Output Summary (Last 24 hours) at 12/10/2023 0902  Last data filed at 2023 0955  Gross per 24 hour   Intake 200 ml   Output --   Net 200 ml    Temp (24hrs), Av °F (37.2 °C), Min:97.6 °F (36.4 °C), Max:100.3 °F (37.9 °C)      Physical Exam  Constitutional:       Appearance: She is well-developed.   HENT:      Head: Normocephalic and atraumatic.   Eyes:      Conjunctiva/sclera: Conjunctivae normal.      Pupils: Pupils are equal, round, and reactive to light.   Cardiovascular:      Rate and Rhythm: Normal rate and regular rhythm.      Heart sounds: Normal heart sounds. No murmur heard.     No friction rub.   Pulmonary:      Effort: Pulmonary effort is normal. No respiratory distress.      Breath sounds: Normal breath sounds. No wheezing or rales.   Abdominal:      General: Bowel sounds are normal. There is no distension.      Palpations: Abdomen is soft.      Tenderness: There is no abdominal tenderness. "   Musculoskeletal:         General: Normal range of motion.      Cervical back: Normal range of motion.   Skin:     Capillary Refill: Capillary refill takes less than 2 seconds.   Neurological:      Mental Status: She is alert and oriented to person, place, and time.      Cranial Nerves: No cranial nerve deficit.   Psychiatric:         Behavior: Behavior normal.             Assessment and Plan:     Primary Problem:  Seizure    Hospital Problem list:    Seizure    Chronic pain    Migraine without status migrainosus, not intractable    Major depression, recurrent    Hypothyroidism (acquired)    Lactic acidosis    Chronic, continuous use of opioids      PMH:  Past Medical History:   Diagnosis Date    Fibromyalgia     Hyperlipidemia     Hypertension     Hypothyroidism     Low back pain     Migraine     Neuropathy        Treatment Plan:  Seizure  Suspect recurrence of her underlying seizure disorder.  Loaded with Keppra in the ER.  Neurology consult appreciate recommendations.  Anticipate that she will get an EEG today and need to be on antiepileptic therapy going forward.  Can transfer out of the ICU after EEG and clearance by neurology     Lactic acidosis  Likely secondary to multiple tonic-clonic seizures.  Receiving IV fluids.  Trend lactate.  No evidence of infection.  Procalcitonin negative.     Hypothyroidism  TSH at goal, continue home Synthroid     Major depression  Resume home medications.     Chronic opioid use  Written by Dr. Carney for chronic headaches.  Will reorder home medication but need to use it cautiously with her sedation postictally currently.     Disposition: Stable for transfer to the floor after EEG if cleared by neurology    Reviewed treatment plans with the patient and/or family.   30 minutes spent in face to face interaction and coordination of care.     Electronically signed by Amol Molina MD on 12/10/2023 at 09:02 CST     Electronically signed by Amol Molina MD at 12/10/23  905          Consult Notes (last 48 hours)        Akanksha Mckeon MD at 23 1659        Consult Orders    1. Inpatient Neurology Consult Other (see comments) (seizures) [[id number]] ordered by Amol Molina MD at 23 1231                 NEUROLOGY CONSULT     DOS: 2023  NAME: Destiny Morelos   : 1965  PCP: London Rascon MD  CC: Stroke  Referring MD: No ref. provider found      Neurological Problem and Interval History:  58 y.o. female with psychiatric conditions, polypharmacy and h/o childhood seiure presents with chief complaint of AMS and witnessed seizure. Patient has a history of childhood seizures as well as a family history of seizures. She has polypharmacy and appeared to be feeling unwell x1 week with GI symptoms and told her daughter she was out of her medications. Unclear if she was unwell and then ran out of certain meds or if her symptoms were from withdrawal. She had a witnessed seizure and was loaded with keppra. She was drowsy and post-ictal but neurologically non-focal. Daughter at bedside notes that she has had a lot of unintentional weight loss. Daughter also notes that she has a history of taking her medications incorrectly or too much.Patient was found to have elevated LFTs, low potassium, elevated  lactate, and leukocytosis with temperature 100.3 (has received tylenol). She ahs had recent exposure to strep throat. She is not on AEDs.  Patient will may have an underlying seizure disorder and this may represent a breakthrough seizure in the setting of possible illness versus medication withdrawal.  However, to be cautious we will also continue to entertain other etiologies such as serotonin syndrome or less likely NMS.  Patient has extensive polypharmacy and from interviewing both the patient and both daughters the patient is fairly unreliable at this current time, it appears that patient has a tendency to overtake amounts of medications.  Given her UDS is  unremarkable however and concern for benzodiazepine withdrawal.  Defer to the primary team for further investigation given her recent weight loss as well as infectious evaluation.      Past Medical/Surgical Hx:  Past Medical History:   Diagnosis Date    Fibromyalgia     Hyperlipidemia     Hypertension     Hypothyroidism     Low back pain     Migraine     Neuropathy      Past Surgical History:   Procedure Laterality Date    APPENDECTOMY      HYSTERECTOMY      OOPHORECTOMY         Review of Systems:       No fever, sweats or chills,  No neck pain, back pain or joint pain  No loss of hearing or tinnitus  No blurry or double vision  No rashes or edema  No chest pain or palpitations  No shortness of breath or wheezing  No diarrhea or constipation  No urinary incontinence or dysuria  No seizures or syncope  No depression or anxiety    Medications On Admission  Medications Prior to Admission   Medication Sig Dispense Refill Last Dose    ARIPiprazole (ABILIFY) 10 MG tablet Take 1 tablet by mouth Daily.       benztropine (COGENTIN) 1 MG tablet Take 1 mg by mouth 2 (Two) Times a Day.       butalbital-acetaminophen-caffeine-codeine (FIORICET WITH CODEINE) -56-30 MG per capsule TAKE (1) CAPSULE EVERY SIX HOURS AS NEEDED.       butalbital-aspirin-caffeine-codeine (FIORINAL WITH CODEINE) -64-30 MG capsule Take 1 capsule by mouth Every 4 (Four) Hours As Needed for Headache.       carisoprodol (SOMA) 350 MG tablet Take 350 mg by mouth 2 (two) times a day.       clonazePAM (KlonoPIN) 0.5 MG tablet Take 0.5 mg by mouth every night at bedtime.       Estrogens Conjugated (PREMARIN PO) Take  by mouth Daily.       fenofibrate 160 MG tablet Take 1 tablet by mouth Daily.       gabapentin (NEURONTIN) 800 MG tablet Take 800 mg by mouth 3 (Three) Times a Day.       levothyroxine (SYNTHROID, LEVOTHROID) 88 MCG tablet TAKE 1 TABLET IN THE MORNING ON AN EMPTY STOMACH       LEVOTHYROXINE SODIUM PO Take  by mouth Daily.        "lisinopril (PRINIVIL,ZESTRIL) 5 MG tablet Take 5 mg by mouth Daily.       LORazepam (ATIVAN) 1 MG tablet TAKE 1 TABLET BY MOUTH ONCE DAILY AS NEEDED FOR PANIC ATTACKS       meloxicam (MOBIC) 15 MG tablet TAKE 1 TABLET BY MOUTH DAILY. 30 tablet 0     oxyCODONE-acetaminophen (PERCOCET)  MG per tablet Take 1 tablet by mouth Every 6 (Six) Hours As Needed.       pravastatin (PRAVACHOL) 20 MG tablet Take 1 tablet by mouth Daily.       rOPINIRole (REQUIP) 2 MG tablet Take 1 tablet by mouth Daily.       sertraline (ZOLOFT) 25 MG tablet Take 25 mg by mouth Daily.       Vraylar 3 MG capsule capsule Take 3 mg by mouth Daily.       zolpidem CR (AMBIEN CR) 12.5 MG CR tablet Take 12.5 mg by mouth At Night As Needed.          Allergies:  Allergies   Allergen Reactions    Morphine Hives       Social Hx:  Social History     Socioeconomic History    Marital status: Single   Tobacco Use    Smoking status: Every Day     Packs/day: 1     Types: Cigarettes    Smokeless tobacco: Never   Substance and Sexual Activity    Alcohol use: No    Drug use: No    Sexual activity: Defer       Family Hx:  Family History   Problem Relation Age of Onset    Cancer Mother     Diabetes Mother     Cancer Father        Review of Imaging (Interpretation of images not reports):  HCT: unremarkable    Laboratory Results:   Lab Results   Component Value Date    GLUCOSE 120 (H) 12/09/2023    CALCIUM 9.3 12/09/2023     (L) 12/09/2023    K 2.3 (C) 12/09/2023    CO2 26.0 12/09/2023    CL 86 (L) 12/09/2023    BUN 15 12/09/2023    CREATININE 0.80 12/09/2023    EGFRIFNONA 127 06/19/2019    BCR 18.8 12/09/2023    ANIONGAP 21.0 (H) 12/09/2023     Lab Results   Component Value Date    WBC 13.97 (H) 12/09/2023    HGB 14.9 12/09/2023    HCT 43.1 12/09/2023    MCV 96.0 12/09/2023     12/09/2023     No results found for: \"CHOL\"  No results found for: \"HDL\"  No results found for: \"LDL\"  No results found for: \"TRIG\"  No results found for: \"HGBA1C\"  Lab " "Results   Component Value Date    INR 0.82 (L) 12/09/2023    PROTIME 11.3 (L) 12/09/2023         Physical Examination:   /73   Pulse 72   Temp 100.3 °F (37.9 °C) (Axillary)   Resp 18   Ht 152.4 cm (60\")   Wt 59 kg (130 lb)   SpO2 97%   BMI 25.39 kg/m²   Patient is drowsy but arouses with stimulation and is oriented to self and follows commands patient has no obvious tremor has some hyperreflexia in the bilateral lower extremities no spontaneous clonus no inducible clonus patient does have cranial nerves intact.  Overall patient is neurologically nonfocal.    Impression/Plan:   58 y.o. female with psychiatric conditions, polypharmacy and h/o childhood seiure presents with chief complaint of AMS and witnessed seizure.     Unclear if this was breakthrough seizure in the setting of underlying seizure disorder or triggered by illness or by medication overuse/underused and withdrawal.    -To be cautious will hold Abilify, Zoloft    -Currently, continue Requip, GBP    -schedule clonazepam 0.5 mg BID    -unintentional weight loss evaluation deferred to the primary team    -Patient will may have an underlying seizure disorder and this may represent a breakthrough seizure in the setting of possible illness versus medication withdrawal.      -However, to be cautious we will also continue to entertain other etiologies such as serotonin syndrome or less likely NMS.  Patient has extensive polypharmacy and from interviewing both the patient and both daughters the patient is fairly unreliable at this current time, it appears that patient has a tendency to overtake amounts of medications.      -Given her UDS is unremarkable however and concern for benzodiazepine withdrawal.      -Defer to the primary team for infectious evaluation.     -MRI brain w/wo when able/stable per primary team    -EEG 12/10    -f/u CK    -Seizure Precautions: Patient is not to drive for at least 3 months until cleared by a physician, operate " heavy machinery, take tub baths, swim unattended, climb heights, or perform any other activities that can bring harm to himself/herself or others during an episode of altered awareness.             I have discussed the above with the patient and family.  Time spent with patient: 60min    Stuart Mckeon MD   Neurology             Electronically signed by Akanksha Mckeon MD at 12/09/23 0956

## 2023-12-11 NOTE — DISCHARGE INSTRUCTIONS
NO DRIVING FOR 90 DAYS    Seizure precautions were discussed to include no tub baths, no swimming, avoiding lack of sleep, and avoiding known triggers. Education given of things that may contribute to a seizure to include, but not limited to: stressful situations, fever, fatigue, lack of sleep, low blood sugar, hyperventilation, flashing lights, and caffeine. Instructions given to take seizure medications as prescribed. Education given to family member on what to do during a seizure and care following the seizure. Education given to contact this office prior to stopping or changing any medications.

## 2023-12-11 NOTE — PROGRESS NOTES
NEUROLOGY FOLLOW-UP     DOS: 2023  NAME: Destiny Morelos   : 1965  PCP: London Rascon MD  CC: Stroke  Referring MD: No ref. provider found    Neurological Problem and Interval History:  58 y.o. female with psychiatric conditions, polypharmacy and h/o childhood seiure presents with chief complaint of AMS and witnessed seizure. Patient has a history of childhood seizures as well as a family history of seizures. She has polypharmacy and appeared to be feeling unwell x1 week with GI symptoms and told her daughter she was out of her medications. Unclear if she was unwell and then ran out of certain meds or if her symptoms were from withdrawal. She had a witnessed seizure and was loaded with keppra. She was drowsy and post-ictal but neurologically non-focal. Daughter at bedside notes that she has had a lot of unintentional weight loss. Daughter also notes that she has a history of taking her medications incorrectly or too much.Patient was found to have elevated LFTs, low potassium, elevated  lactate, and leukocytosis with temperature 100.3 (has received tylenol). She ahs had recent exposure to strep throat. She is not on AEDs.  Patient will may have an underlying seizure disorder and this may represent a breakthrough seizure in the setting of possible illness versus medication withdrawal.  However, to be cautious we will also continue to entertain other etiologies such as serotonin syndrome or less likely NMS.  Patient has extensive polypharmacy and from interviewing both the patient and both daughters the patient is fairly unreliable at this current time, it appears that patient has a tendency to overtake amounts of medications.  Given her UDS is unremarkable however and concern for benzodiazepine withdrawal.  Defer to the primary team for further investigation given her recent weight loss as well as infectious evaluation.    12/10: Patient found to be COVID positive, OK to resume home medications,  continue keppra, await EEG, MRI is unremarkable. May have underlying seizure disorder given history of childhood seizures with breakthrough in the setting of infection vs infection and seizures due to withdrawal of home medications (benzos and barbiturates).      12/11: sitting up in bed. No family at bedside. No seizures reported or observed. She has been tolerating Keppra 500 mg IV BID. Will change to oral.  She tells me she ran out of her medications and not able to get refills and she had not kept her f/u appointments. Patient lives with her daughter and grand daughter.  She has lost about 60 pounds over the last year and will defer this to her PCP.  Patient has c/o abdominal pain but when asked patient location, she points to lower sternum.     Ck 803 down form 1074.  K+ 3.7 up from 2.3 on admission.       Medications On Admission  Medications Prior to Admission   Medication Sig Dispense Refill Last Dose    clonazePAM (KlonoPIN) 0.5 MG tablet Take 1 tablet by mouth 3 (Three) Times a Day.       gabapentin (NEURONTIN) 800 MG tablet Take 1 tablet by mouth 3 (Three) Times a Day.       levothyroxine (SYNTHROID, LEVOTHROID) 88 MCG tablet Take 1 tablet by mouth Daily.       rOPINIRole (REQUIP) 4 MG tablet Take 1 tablet by mouth Every Night.       ARIPiprazole (ABILIFY) 10 MG tablet Take 1 tablet by mouth Daily.       butalbital-aspirin-caffeine-codeine (FIORINAL WITH CODEINE) -29-30 MG capsule Take 1 capsule by mouth Every 6 (Six) Hours As Needed for Headache.       carisoprodol (SOMA) 350 MG tablet Take 1 tablet by mouth 3 (Three) Times a Day As Needed for Muscle Spasms.       lisinopril (PRINIVIL,ZESTRIL) 5 MG tablet Take 1 tablet by mouth Daily.       oxyCODONE-acetaminophen (PERCOCET)  MG per tablet Take 1 tablet by mouth Every 6 (Six) Hours As Needed.       pravastatin (PRAVACHOL) 20 MG tablet Take 1 tablet by mouth Daily.       Vraylar 3 MG capsule capsule Take 1 capsule by mouth Daily.        "zolpidem CR (AMBIEN CR) 12.5 MG CR tablet Take 1 tablet by mouth At Night As Needed.            Laboratory Results:   Lab Results   Component Value Date    GLUCOSE 82 12/10/2023    CALCIUM 8.0 (L) 12/10/2023     12/10/2023    K 3.7 12/10/2023    CO2 27.0 12/10/2023    CL 99 12/10/2023    BUN 10 12/10/2023    CREATININE 0.38 (L) 12/10/2023    EGFRIFNONA 127 06/19/2019    BCR 26.3 (H) 12/10/2023    ANIONGAP 10.0 12/10/2023     Lab Results   Component Value Date    WBC 9.68 12/10/2023    HGB 12.4 12/10/2023    HCT 37.4 12/10/2023    MCV 99.2 (H) 12/10/2023     12/10/2023     No results found for: \"CHOL\"  No results found for: \"HDL\"  No results found for: \"LDL\"  No results found for: \"TRIG\"  No results found for: \"HGBA1C\"  Lab Results   Component Value Date    INR 0.82 (L) 12/09/2023    PROTIME 11.3 (L) 12/09/2023       EEG    Result Date: 12/10/2023  Diffuse cerebral dysfunction of mild degree but nonspecific No ongoing seizures are seen This report is transcribed using the Dragon dictation system.      MRI Brain With & Without Contrast    Result Date: 12/10/2023  1. No acute intracranial abnormality.  This report was signed and finalized on 12/10/2023 3:39 PM by Dr. Juan Daniel Millard MD.      XR Chest 1 View    Result Date: 12/9/2023   No acute cardiopulmonary abnormality.    This report was signed and finalized on 12/9/2023 9:58 AM by Shorty Ricci.      CT Head Without Contrast    Result Date: 12/9/2023  Impression:   No acute intracranial abnormality.  This report was signed and finalized on 12/9/2023 9:34 AM by Shorty Ricci.          Physical Examination:   /60 (BP Location: Left arm, Patient Position: Lying)   Pulse 99   Temp 97.5 °F (36.4 °C) (Oral)   Resp 18   Ht 152.4 cm (60\")   Wt 59 kg (130 lb)   SpO2 99%   BMI 25.39 kg/m²         General Exam:  Patient looks thin and frail.  Head:  Normocephalic, atraumatic  HEENT:  Neck supple  Fundoscopic Exam:  No signs of disc edema  CVS:  " Regular rate and rhythm.  No murmurs  Carotid Examination:  No bruits  Lungs:  Clear to auscultation  Abdomen:  Nontender, nondistended  Extremities:  No signs of peripheral edema  Skin:  No rashes    Neurologic Exam:    Mental Status:    -Alert, Oriented X 3  -No word-finding difficulties  -No aphasia  -No dysarthria  -Follows simple and complex commands    CN II:  Visual fields full.  Pupils equally reactive to light  CN III, IV, VI:  Extraocular Muscles full with no signs of nystagmus  CN V:  Facial sensory is symmetric with no asymmetries.  CN VII:  Facial motor symmetric  CN VIII:  Gross hearing intact bilaterally  CN IX:  Palate elevates symmetrically  CN X:  Palate elevates symmetrically  CN XI:  Shoulder shrug symmetric  CN XII:  Tongue protrudes to midline  Motor: (strength out of 5:  1= minimal movement, 2 = movement in plane of gravity, 3 = movement against gravity, 4 = movement against some resistance, 5 = full strength)    -Right Upper Ext: Proximal: 5 Distal: 5  -Left Upper Ext: Proximal: 5 Distal: 5    -Right Lower Ext: Proximal: 5 Distal: 5  -Left Lower Ext: Proximal: 5 Distal: 5    DTR:  -Right   Bicep: 2+ Tricep: 2+ Brachoradialis: 2+   Patella: 2+ Ankle: 2+ Neg Babinski  -Left   Bicep: 2+ Tricep: 2+ Brachoradialis: 2+   Patella: 2+ Ankle: 2+ Neg Babinski    Sensory:  -Intact to light touch, pinprick, temperature, pain, and proprioception    Coordination:  -Finger to nose intact  -Heel to shin intact      Gait  -not tested for safety reasons.            Impression/Plan:   58 y.o. female with psychiatric conditions, polypharmacy and h/o childhood seiure presents with chief complaint of AMS and witnessed seizure.      Unclear if this was breakthrough seizure in the setting of underlying seizure disorder or triggered by illness or by medication overuse/underused and withdrawal.    -12/10: OK to resume home medications, continue keppra, await EEG, MRI is unremarkable. May have underlying seizure  disorder given history of childhood seizures with breakthrough in the setting of infection vs infection and seizures due to withdrawal of home medications (benzos and barbiturates).       -OK to restart Abilify, Zoloft     -Currently, continue Requip, gabapentin.      -schedule clonazepam 0.5 mg BID     -unintentional weight loss evaluation deferred to the primary team, likely an outpatient evaluation by her PCP.     -Patient will may have an underlying seizure disorder and this may represent a breakthrough seizure in the setting of possible illness versus medication withdrawal.       -Given her UDS is unremarkable however and concern for benzodiazepine/barbiturate withdrawal.         -MRI brain w/wo unremarkable     -EEG      12/11 -repeat CK, is trending down joseph.   Nutrition consult for unintentional weight loss and malnutrition assessment. Defer further workup to primary team.    will change IV Keppra to  mg BID.   Patient will need f/u in neurology clinic. Would also like to address  patient HA history as outpatient. I would recommend to taper off Fioricet and transition to other HA prevention medication.      -Seizure Precautions: Patient is not to drive for at least 3 months until cleared by a physician, operate heavy machinery, take tub baths, swim unattended, climb heights, or perform any other activities that can bring harm to himself/herself or others during an episode of altered awareness.    I have discussed the above with the patient   Time spent with patient: 30min         SHEILA Sams   Neurology

## 2023-12-11 NOTE — PLAN OF CARE
Goal Outcome Evaluation:  Plan of Care Reviewed With: patient        Progress: improving  Outcome Evaluation: BP soft. A&Ox3. Disoriented to time, but easily reoriented. No seizure activity noted. Numbness noted in BLE. C/o pain. Prn given. Call light within reach. Safety maintained.

## 2023-12-11 NOTE — NURSING NOTE
Pt transferred to the medical floor on 2L O2. No s/s resp distress. No seizure activity. Report given to Dana RYAN

## 2023-12-11 NOTE — CONSULTS
Malnutrition Severity Assessment  Patient Name:  Destiny Morelos  YOB: 1965  MRN: [id number]  Admit Date:  12/9/2023  Assessment Date:  12/11/2023    Malnutrition Severity Assessment : Does not meet criteria      Row Name 12/11/23 1329          Malnutrition Severity Assessment    Malnutrition Type Chronic Disease - Related Malnutrition        Insufficient Energy Intake     Insufficient Energy Intake Findings None        Unintentional Weight Loss     Unintentional Weight Loss Findings None        Muscle Loss    Loss of Muscle Mass Findings Moderate     Temple Region None     Clavicle Bone Region Moderate - some protrusion in females, visible in males     Acromion Bone Region Moderate - acromion may slightly protrude     Scapular Bone Region None     Dorsal Hand Region None     Patellar Region None     Anterior Thigh Region Moderate - mild depression on inner thigh     Posterior Calf Region Moderate - some roundness, slight firmness        Fat Loss    Subcutaneous Fat Loss Findings None     Orbital Region  None     Upper Arm Region Moderate - some fat tissue, not ample     Thoracic & Lumbar Region None        Fluid Accumulation (Edema)    Fluid Acumulation Findings --  None        Declining Functional Status    Declining Functional Status Findings --  Denied        Criteria Met (Must meet criteria for severity in at least 2 of these categories: M Wasting, Fat Loss, Fluid, Secondary Signs, Wt. Status, Intake)    Patient meets criteria for  --  Does not meet criteria       Reason for Assessment       Row Name 12/11/23 1320          Reason for Assessment    Reason For Assessment identified at risk by screening criteria     Diagnosis neurologic conditions     Identified At Risk by Screening Criteria MST SCORE 2+;other (see comments)  Malnutrition Severity Assessment          Nutrition/Diet History       Row Name 12/11/23 0040          Nutrition/Diet History    Typical Intake (Food/Fluid/EN/PN) Pt denies food  allergies, chewing/swallowing difficulty, N/V/D, constipation, poor appetite. Pt reported UBW 125lb. Current wt 130lb. Review of weight hx showed wt 122.4lb 3/1/2022. Moderate muscle and fat wasting noted on NFPE; pt's normal body habitus. Muscle tone to thighs/calves WNL; upper body muscle tone noted and strength present during exam. Pt denied lack of energy, reduced mobility/weakness, abdominal pain/discomfort, or secondary signs/symptoms of malnutrition. Pt daughter brought lunch from OSH. Provided pt a menu and encouraged daily menu selections.     Food Preferences Requests chocolate milk and cereal with all meals.     Food Intolerance(s) None          Labs/Tests/Procedures/Meds       Row Name 12/11/23 1327          Labs/Procedures/Meds    Lab Results Reviewed reviewed     Lab Results Comments CRP        Diagnostic Tests/Procedures    Diagnostic Test/Procedure Reviewed reviewed        Medications    Pertinent Medications Reviewed reviewed     Pertinent Medications Comments See MAR          Physical Findings       Row Name 12/11/23 1327          Physical Findings    Overall Physical Appearance NC, no edema, BM 12/10, Preet Score 19, skin intact, see MSA.          Estimated/Assessed Needs - Anthropometrics       Row Name 12/11/23 1328          Anthropometrics    Weight for Calculation 59 kg (130 lb)     Additional Documentation Usual Body Weight (UBW) (Group)        Usual Body Weight (UBW)    Usual Body Weight 56.7 kg (125 lb)        Estimated/Assessed Needs    Additional Documentation Fluid Requirements (Group);KCAL/KG (Group);Protein Requirements (Group)        KCAL/KG    KCAL/KG 25 Kcal/Kg (kcal);30 Kcal/Kg (kcal)     25 Kcal/Kg (kcal) 1474.2     30 Kcal/Kg (kcal) 1769.04        Protein Requirements    Weight Used For Protein Calculations 59 kg (130 lb)     Est Protein Requirement Amount (gms/kg) 1.0 gm protein     Estimated Protein Requirements (gms/day) 58.97        Fluid Requirements    Fluid Requirements  (mL/day) 1769     Estimated Fluid Requirement Method other (see comments)  0813-5874     RDA Method (mL) 1769          Nutrition Prescription Ordered       Row Name 12/11/23 1328          Nutrition Prescription PO    Current PO Diet Regular     Fluid Consistency Thin          Evaluation of Received Nutrient/Fluid Intake       Row Name 12/11/23 1328          Nutrient/Fluid Evaluation    Number of Days Evaluated 2 days        Fluid Intake Evaluation    Oral Fluid (mL) 800  admit to present total     Other Fluid (mL) 200  admit to present total        PO Evaluation    Number of Days PO Intake Evaluated 2 days     Number of Meals 4     % PO Intake 56         Problem/Interventions:   Problem 1       Row Name 12/11/23 1329          Nutrition Diagnoses Problem 1    Problem 1 Nutrition Appropriate for Condition at this Time          Intervention Goal       Row Name 12/11/23 1329          Intervention Goal    General Meet nutritional needs for age/condition;Disease management/therapy     PO Tolerate PO;Meet estimated needs     Weight Maintain weight          Nutrition Intervention       Row Name 12/11/23 1329          Nutrition Intervention    RD/Tech Action Follow Tx progress;Care plan reviewd;Encourage intake;Menu provided;Advise available snack;Advise alternate selection;Recommend/ordered     Recommended/Ordered Other (comment)  preference/request          Nutrition Prescription       Row Name 12/11/23 1330          Nutrition Prescription PO    PO Prescription Other (comment)  Chocolate milk and Cheerios all meals per request          Education/Evaluation       Row Name 12/11/23 1330          Education    Education No discharge needs identified at this time        Monitor/Evaluation    Monitor Per protocol         Electronically signed by:  Giovanna Lester RDN, CHEYANNE  12/11/23 13:30 CST

## 2023-12-11 NOTE — PLAN OF CARE
Goal Outcome Evaluation:  Plan of Care Reviewed With: patient        Progress: improving  Outcome Evaluation: Pt A&Ox4. Forgetful. Seizure precautions in place. PPP. Denies n/t. C/o of headache this shift w/ PRN pain meds given w/ good relief. Call light in reach. Safety maintained.

## 2023-12-12 VITALS
DIASTOLIC BLOOD PRESSURE: 59 MMHG | HEART RATE: 92 BPM | RESPIRATION RATE: 16 BRPM | OXYGEN SATURATION: 93 % | WEIGHT: 130 LBS | SYSTOLIC BLOOD PRESSURE: 115 MMHG | BODY MASS INDEX: 25.52 KG/M2 | TEMPERATURE: 98 F | HEIGHT: 60 IN

## 2023-12-12 LAB
ALBUMIN SERPL-MCNC: 3.3 G/DL (ref 3.5–5.2)
ALBUMIN/GLOB SERPL: 1.4 G/DL
ALP SERPL-CCNC: 80 U/L (ref 39–117)
ALT SERPL W P-5'-P-CCNC: 17 U/L (ref 1–33)
ANION GAP SERPL CALCULATED.3IONS-SCNC: 5 MMOL/L (ref 5–15)
AST SERPL-CCNC: 14 U/L (ref 1–32)
BASOPHILS # BLD AUTO: 0.03 10*3/MM3 (ref 0–0.2)
BASOPHILS NFR BLD AUTO: 0.3 % (ref 0–1.5)
BILIRUB SERPL-MCNC: <0.2 MG/DL (ref 0–1.2)
BUN SERPL-MCNC: 8 MG/DL (ref 6–20)
BUN/CREAT SERPL: 23.5 (ref 7–25)
CALCIUM SPEC-SCNC: 8.6 MG/DL (ref 8.6–10.5)
CHLORIDE SERPL-SCNC: 102 MMOL/L (ref 98–107)
CK SERPL-CCNC: 240 U/L (ref 20–180)
CO2 SERPL-SCNC: 32 MMOL/L (ref 22–29)
CREAT SERPL-MCNC: 0.34 MG/DL (ref 0.57–1)
DEPRECATED RDW RBC AUTO: 47.8 FL (ref 37–54)
EGFRCR SERPLBLD CKD-EPI 2021: 120.2 ML/MIN/1.73
EOSINOPHIL # BLD AUTO: 0.2 10*3/MM3 (ref 0–0.4)
EOSINOPHIL NFR BLD AUTO: 2.3 % (ref 0.3–6.2)
ERYTHROCYTE [DISTWIDTH] IN BLOOD BY AUTOMATED COUNT: 12.5 % (ref 12.3–15.4)
GLOBULIN UR ELPH-MCNC: 2.4 GM/DL
GLUCOSE SERPL-MCNC: 99 MG/DL (ref 65–99)
HCT VFR BLD AUTO: 36.2 % (ref 34–46.6)
HGB BLD-MCNC: 11.3 G/DL (ref 12–15.9)
IMM GRANULOCYTES # BLD AUTO: 0.03 10*3/MM3 (ref 0–0.05)
IMM GRANULOCYTES NFR BLD AUTO: 0.3 % (ref 0–0.5)
LYMPHOCYTES # BLD AUTO: 3.06 10*3/MM3 (ref 0.7–3.1)
LYMPHOCYTES NFR BLD AUTO: 34.7 % (ref 19.6–45.3)
MCH RBC QN AUTO: 32.3 PG (ref 26.6–33)
MCHC RBC AUTO-ENTMCNC: 31.2 G/DL (ref 31.5–35.7)
MCV RBC AUTO: 103.4 FL (ref 79–97)
MONOCYTES # BLD AUTO: 0.72 10*3/MM3 (ref 0.1–0.9)
MONOCYTES NFR BLD AUTO: 8.2 % (ref 5–12)
NEUTROPHILS NFR BLD AUTO: 4.78 10*3/MM3 (ref 1.7–7)
NEUTROPHILS NFR BLD AUTO: 54.2 % (ref 42.7–76)
NRBC BLD AUTO-RTO: 0 /100 WBC (ref 0–0.2)
PLATELET # BLD AUTO: 252 10*3/MM3 (ref 140–450)
PMV BLD AUTO: 9.7 FL (ref 6–12)
POTASSIUM SERPL-SCNC: 3.8 MMOL/L (ref 3.5–5.2)
PROT SERPL-MCNC: 5.7 G/DL (ref 6–8.5)
RBC # BLD AUTO: 3.5 10*6/MM3 (ref 3.77–5.28)
SODIUM SERPL-SCNC: 139 MMOL/L (ref 136–145)
WBC NRBC COR # BLD AUTO: 8.82 10*3/MM3 (ref 3.4–10.8)

## 2023-12-12 PROCEDURE — 80053 COMPREHEN METABOLIC PANEL: CPT | Performed by: FAMILY MEDICINE

## 2023-12-12 PROCEDURE — 85025 COMPLETE CBC W/AUTO DIFF WBC: CPT | Performed by: FAMILY MEDICINE

## 2023-12-12 PROCEDURE — 82550 ASSAY OF CK (CPK): CPT | Performed by: CLINICAL NURSE SPECIALIST

## 2023-12-12 RX ORDER — CLONAZEPAM 0.5 MG/1
0.5 TABLET ORAL EVERY 12 HOURS SCHEDULED
Qty: 60 TABLET | Refills: 0 | OUTPATIENT
Start: 2023-12-12 | End: 2024-01-11

## 2023-12-12 RX ORDER — LEVETIRACETAM 500 MG/1
500 TABLET ORAL 2 TIMES DAILY
Qty: 60 TABLET | Refills: 0 | Status: SHIPPED | OUTPATIENT
Start: 2023-12-12 | End: 2024-01-11

## 2023-12-12 RX ORDER — NALOXONE HYDROCHLORIDE 4 MG/.1ML
SPRAY NASAL
Qty: 2 EACH | Refills: 0 | Status: SHIPPED | OUTPATIENT
Start: 2023-12-12

## 2023-12-12 RX ADMIN — GABAPENTIN 600 MG: 300 CAPSULE ORAL at 06:02

## 2023-12-12 RX ADMIN — NICOTINE 1 PATCH: 14 PATCH, EXTENDED RELEASE TRANSDERMAL at 09:12

## 2023-12-12 RX ADMIN — LEVETIRACETAM 500 MG: 500 TABLET, FILM COATED ORAL at 09:10

## 2023-12-12 RX ADMIN — LEVOTHYROXINE SODIUM 88 MCG: 88 TABLET ORAL at 06:02

## 2023-12-12 RX ADMIN — CLONAZEPAM 0.5 MG: 0.5 TABLET ORAL at 09:10

## 2023-12-12 NOTE — PLAN OF CARE
Goal Outcome Evaluation:  Plan of Care Reviewed With: patient, daughter        Progress: improving  Outcome Evaluation: Pt A&Ox4. Forgetful. Seizure precautions in place. PPP. Denies n/t. 92% on room air. Plans to DC home in progress. Daughter at BS. Call light in reach. Safety maintained.

## 2023-12-12 NOTE — PLAN OF CARE
Goal Outcome Evaluation:  Plan of Care Reviewed With: patient        Progress: improving  Outcome Evaluation: Pt A&Ox4, forgetful at times and needs extra time to respond. VSS. Seizure precautions maintained. No neuro changes. Call light within reach. Safety maintained.

## 2023-12-12 NOTE — DISCHARGE SUMMARY
Hospital Discharge Summary    Destiny Morelos  :  1965  MRN:  [id number]    Admit date:  2023  Discharge date:  2023    Admitting Physician:    London Rascon MD    Discharge Diagnoses:      Seizure    Chronic pain    Migraine without status migrainosus, not intractable    Major depression, recurrent    Hypothyroidism (acquired)    Lactic acidosis    Chronic, continuous use of opioids  Rhabdomyolysis    Hospital Course:   The patient is a 58 y.o. female who presents with acute onset of seizure.  No family at bedside and patient is mildly postictal.  States that she had seizures in childhood and her last seizure was 25 years ago.  Has not had a seizure medication prescribed in the last 10 years.  Follows with Dr. Carney for chronic headaches for which she is on chronic opioid therapy.  She also has significant psychiatric illnesses for which she takes atypical antipsychotic, benzodiazepines, and SSRI.  She denies any complaints of pain currently.  She is somewhat somnolent and difficult to get history from.  Per ER physician, the patient had multiple small seizures in the ER.  She was loaded with 2 g of Keppra admitted to ICU for further monitoring and neurologic evaluation. Transitioned to the floor with no further seizure activity in house. Potassium corrected and IVF used to treat rhabdomyolysis with DC . Patient was asymptomatic and ready for dc this AM.  Please see daily progress note for further details concerning their stay. The patient improved throughout their stay and reached maximum medical improvement on the day of discharge. The patient/family agree with the treatment plan as outlined above. All questions concerning their stay were answered prior to discharge. They understand the importance of follow up concerning any abnormal test results.     Physical Exam  Vitals and nursing note reviewed.   Constitutional:       Appearance: Normal appearance.   HENT:      Head: Normocephalic  and atraumatic.   Eyes:      Pupils: Pupils are equal, round, and reactive to light.   Cardiovascular:      Rate and Rhythm: Normal rate and regular rhythm.      Pulses: Normal pulses.   Pulmonary:      Effort: Pulmonary effort is normal.      Breath sounds: Normal breath sounds.   Abdominal:      General: Abdomen is flat. Bowel sounds are normal.      Palpations: Abdomen is soft.   Musculoskeletal:         General: Normal range of motion.   Skin:     General: Skin is warm.      Capillary Refill: Capillary refill takes less than 2 seconds.   Neurological:      General: No focal deficit present.      Mental Status: She is alert.           Discharge Medications:         Discharge Medications        New Medications        Instructions Start Date   levETIRAcetam 500 MG tablet  Commonly known as: KEPPRA   500 mg, Oral, 2 Times Daily      naloxone 4 MG/0.1ML nasal spray  Commonly known as: NARCAN   Call 911. Don't prime. [place] in 1 nostril for overdose. Repeat in 2-3 minutes in other nostril if no or minimal breathing/responsiveness.             Changes to Medications        Instructions Start Date   clonazePAM 0.5 MG tablet  Commonly known as: KlonoPIN  What changed:   when to take this  Another medication with the same name was removed. Continue taking this medication, and follow the directions you see here.   0.5 mg, Oral, Every 12 Hours Scheduled             Continue These Medications        Instructions Start Date   ARIPiprazole 10 MG tablet  Commonly known as: ABILIFY   1 tablet, Oral, Every 24 Hours      gabapentin 800 MG tablet  Commonly known as: NEURONTIN   800 mg, Oral, 3 Times Daily      levothyroxine 88 MCG tablet  Commonly known as: SYNTHROID, LEVOTHROID   1 tablet, Oral, Daily      lisinopril 5 MG tablet  Commonly known as: PRINIVIL,ZESTRIL   5 mg, Oral, Daily      oxyCODONE-acetaminophen  MG per tablet  Commonly known as: PERCOCET   1 tablet, Oral, Every 6 Hours PRN      pravastatin 20 MG  tablet  Commonly known as: PRAVACHOL   1 tablet, Oral, Every 24 Hours      rOPINIRole 4 MG tablet  Commonly known as: REQUIP   4 mg, Oral, Nightly             Stop These Medications      butalbital-aspirin-caffeine-codeine -60-30 MG capsule  Commonly known as: FIORINAL WITH CODEINE     carisoprodol 350 MG tablet  Commonly known as: SOMA     Vraylar 3 MG capsule capsule  Generic drug: Cariprazine HCl     zolpidem CR 12.5 MG CR tablet  Commonly known as: AMBIEN CR              Activity: as tolerated, NO DRIVING 3 MONTHS PER NEURO    Diet: AS TOLERATED    Consults: NEUROLOGY    Significant Diagnostic Studies:    EEG    Result Date: 12/10/2023  Diffuse cerebral dysfunction of mild degree but nonspecific No ongoing seizures are seen This report is transcribed using the Dragon dictation system.      MRI Brain With & Without Contrast    Result Date: 12/10/2023  1. No acute intracranial abnormality.  This report was signed and finalized on 12/10/2023 3:39 PM by Dr. Juan Daniel Millard MD.      XR Chest 1 View    Result Date: 12/9/2023   No acute cardiopulmonary abnormality.    This report was signed and finalized on 12/9/2023 9:58 AM by Shorty Ricci.      CT Head Without Contrast    Result Date: 12/9/2023  Impression:   No acute intracranial abnormality.  This report was signed and finalized on 12/9/2023 9:34 AM by Shorty Ricci.            Treatments:   AS ABOVE    Disposition:   HOME    Time spent on discharge including discussion with patient/family, SW, and coordination of care.     Follow up with London Rascon MD in 1 weeks.    Signed:  Ken Melvin MD   12/12/2023, 08:29 CST

## 2023-12-12 NOTE — PAYOR COMM NOTE
"REF:   [id number]     [facility]  FAX   [phone]     Kenia Smtih (58 y.o. Female)       Date of Birth   1965    Social Security Number       Address   [address] [place] [address]    Home Phone   [phone]    MRN   [id number]       [employer]   Other    Marital Status   Single                            Admission Date   23    Admission Type   Emergency    Admitting Provider   London Rascon MD    Attending Provider       Department, Room/Bed   [facility] 3A, 339/1       Discharge Date   2023    Discharge Disposition   Home or Self Care    Discharge Destination                                 Attending Provider: (none)   Allergies: Morphine    Isolation: None   Infection: None   Code Status: CPR    Ht: 152.4 cm (60\")   Wt: 59 kg (130 lb)    Admission Cmt: None   Principal Problem: Seizure [R56.9]                   Active Insurance as of 2023       Primary Coverage       Payor Plan Insurance Group Employer/Plan Group    ANTHEM MEDICARE REPLACEMENT ANTHEM MEDICARE ADVANTAGE [id number]       Payor Plan Address Payor Plan Phone Number Payor Plan Fax Number Effective Dates    [address] [phone]  2023 - None Entered    [facility] [address]         Subscriber Name Subscriber Birth Date Member ID       KENIA SMITH 1965 [id number]                     Emergency Contacts        (Rel.) Home Phone Work Phone Mobile Phone    Aruna Werner (Daughter) [phone] -- [phone]                 Discharge Summary        Ken Melvin MD at 23 0829            Hospital Discharge Summary    Kenia Smith  :  1965  MRN:  [id number]    Admit date:  2023  Discharge date:  2023    Admitting Physician:    London Rascon MD    Discharge Diagnoses:      Seizure    Chronic pain    Migraine without status migrainosus, not intractable    Major depression, recurrent    Hypothyroidism (acquired)    " Lactic acidosis    Chronic, continuous use of opioids  Rhabdomyolysis    Hospital Course:   The patient is a 58 y.o. female who presents with acute onset of seizure.  No family at bedside and patient is mildly postictal.  States that she had seizures in childhood and her last seizure was 25 years ago.  Has not had a seizure medication prescribed in the last 10 years.  Follows with Dr. Carney for chronic headaches for which she is on chronic opioid therapy.  She also has significant psychiatric illnesses for which she takes atypical antipsychotic, benzodiazepines, and SSRI.  She denies any complaints of pain currently.  She is somewhat somnolent and difficult to get history from.  Per ER physician, the patient had multiple small seizures in the ER.  She was loaded with 2 g of Keppra admitted to ICU for further monitoring and neurologic evaluation. Transitioned to the floor with no further seizure activity in house. Potassium corrected and IVF used to treat rhabdomyolysis with DC . Patient was asymptomatic and ready for dc this AM.  Please see daily progress note for further details concerning their stay. The patient improved throughout their stay and reached maximum medical improvement on the day of discharge. The patient/family agree with the treatment plan as outlined above. All questions concerning their stay were answered prior to discharge. They understand the importance of follow up concerning any abnormal test results.     Physical Exam  Vitals and nursing note reviewed.   Constitutional:       Appearance: Normal appearance.   HENT:      Head: Normocephalic and atraumatic.   Eyes:      Pupils: Pupils are equal, round, and reactive to light.   Cardiovascular:      Rate and Rhythm: Normal rate and regular rhythm.      Pulses: Normal pulses.   Pulmonary:      Effort: Pulmonary effort is normal.      Breath sounds: Normal breath sounds.   Abdominal:      General: Abdomen is flat. Bowel sounds are normal.       Palpations: Abdomen is soft.   Musculoskeletal:         General: Normal range of motion.   Skin:     General: Skin is warm.      Capillary Refill: Capillary refill takes less than 2 seconds.   Neurological:      General: No focal deficit present.      Mental Status: She is alert.           Discharge Medications:         Discharge Medications        New Medications        Instructions Start Date   levETIRAcetam 500 MG tablet  Commonly known as: KEPPRA   500 mg, Oral, 2 Times Daily      naloxone 4 MG/0.1ML nasal spray  Commonly known as: NARCAN   Call 911. Don't prime. [address] in 1 nostril for overdose. Repeat in 2-3 minutes in other nostril if no or minimal breathing/responsiveness.             Changes to Medications        Instructions Start Date   clonazePAM 0.5 MG tablet  Commonly known as: KlonoPIN  What changed:   when to take this  Another medication with the same name was removed. Continue taking this medication, and follow the directions you see here.   0.5 mg, Oral, Every 12 Hours Scheduled             Continue These Medications        Instructions Start Date   ARIPiprazole 10 MG tablet  Commonly known as: ABILIFY   1 tablet, Oral, Every 24 Hours      gabapentin 800 MG tablet  Commonly known as: NEURONTIN   800 mg, Oral, 3 Times Daily      levothyroxine 88 MCG tablet  Commonly known as: SYNTHROID, LEVOTHROID   1 tablet, Oral, Daily      lisinopril 5 MG tablet  Commonly known as: PRINIVIL,ZESTRIL   5 mg, Oral, Daily      oxyCODONE-acetaminophen  MG per tablet  Commonly known as: PERCOCET   1 tablet, Oral, Every 6 Hours PRN      pravastatin 20 MG tablet  Commonly known as: PRAVACHOL   1 tablet, Oral, Every 24 Hours      rOPINIRole 4 MG tablet  Commonly known as: REQUIP   4 mg, Oral, Nightly             Stop These Medications      butalbital-aspirin-caffeine-codeine -73-30 MG capsule  Commonly known as: FIORINAL WITH CODEINE     carisoprodol 350 MG tablet  Commonly known as: SOMA     Vraylar 3 MG  capsule capsule  Generic drug: Cariprazine HCl     zolpidem CR 12.5 MG CR tablet  Commonly known as: AMBIEN CR              Activity: as tolerated, NO DRIVING 3 MONTHS PER NEURO    Diet: AS TOLERATED    Consults: NEUROLOGY    Significant Diagnostic Studies:    EEG    Result Date: 12/10/2023  Diffuse cerebral dysfunction of mild degree but nonspecific No ongoing seizures are seen This report is transcribed using the Dragon dictation system.      MRI Brain With & Without Contrast    Result Date: 12/10/2023  1. No acute intracranial abnormality.  This report was signed and finalized on 12/10/2023 3:39 PM by Dr. Juan Daniel Millard MD.      XR Chest 1 View    Result Date: 12/9/2023   No acute cardiopulmonary abnormality.    This report was signed and finalized on 12/9/2023 9:58 AM by Shorty Ricci.      CT Head Without Contrast    Result Date: 12/9/2023  Impression:   No acute intracranial abnormality.  This report was signed and finalized on 12/9/2023 9:34 AM by Shorty Ricci.            Treatments:   AS ABOVE    Disposition:   HOME    Time spent on discharge including discussion with patient/family, SW, and coordination of care.     Follow up with London Rascon MD in 1 weeks.    Signed:  Sincere Zhong MD   12/12/2023, 08:29 CST    Electronically signed by Sincere Zhong MD at 12/12/23 0831       Discharge Order (From admission, onward)       Start     Ordered    12/12/23 0826  Discharge patient  Once        Expected Discharge Date: 12/12/23   Expected Discharge Time: Morning   Discharge Disposition: Home or Self Care   Physician of Record for Attribution - Please select from Treatment Team: SINCERE ZHONG [[id number]]   Review needed by CMO to determine Physician of Record: No   Please choose which facility the patient is currently admitted if they are being discharged to another facility or unit.:  [place]   Mode: Family      Question Answer Comment   Physician of Record for Attribution - Please select  from Treatment Team SINCERE ZHONG    Review needed by CMO to determine Physician of Record No    Please choose which facility the patient is currently admitted if they are being discharged to another facility or unit.  [facility]    Mode: Family        12/12/23 [id number]

## 2024-01-10 ENCOUNTER — OFFICE VISIT (OUTPATIENT)
Dept: NEUROLOGY | Age: 59
End: 2024-01-10
Payer: MEDICARE

## 2024-01-10 VITALS
BODY MASS INDEX: 20.02 KG/M2 | HEART RATE: 106 BPM | WEIGHT: 113 LBS | SYSTOLIC BLOOD PRESSURE: 137 MMHG | DIASTOLIC BLOOD PRESSURE: 89 MMHG | HEIGHT: 63 IN

## 2024-01-10 DIAGNOSIS — G89.29 OTHER CHRONIC PAIN: ICD-10-CM

## 2024-01-10 DIAGNOSIS — M54.2 PAIN, NECK: ICD-10-CM

## 2024-01-10 DIAGNOSIS — G43.909 MIGRAINE WITHOUT STATUS MIGRAINOSUS, NOT INTRACTABLE, UNSPECIFIED MIGRAINE TYPE: Primary | ICD-10-CM

## 2024-01-10 DIAGNOSIS — R51.9 NONINTRACTABLE HEADACHE, UNSPECIFIED CHRONICITY PATTERN, UNSPECIFIED HEADACHE TYPE: ICD-10-CM

## 2024-01-10 PROCEDURE — 99213 OFFICE O/P EST LOW 20 MIN: CPT | Performed by: PSYCHIATRY & NEUROLOGY

## 2024-01-10 RX ORDER — LEVETIRACETAM 500 MG/1
500 TABLET ORAL 2 TIMES DAILY
Qty: 60 TABLET | Refills: 5 | Status: SHIPPED | OUTPATIENT
Start: 2024-01-10

## 2024-01-10 RX ORDER — BUTALBITAL, ACETAMINOPHEN, CAFFEINE AND CODEINE PHOSPHATE 50; 325; 40; 30 MG/1; MG/1; MG/1; MG/1
CAPSULE ORAL
COMMUNITY
Start: 2023-12-16

## 2024-01-10 NOTE — PROGRESS NOTES

## 2024-01-10 NOTE — PROGRESS NOTES
Ally Gonzalez is a 58 y.o. year old female who is seen for evaluation of multiple complaints. 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 One day she was taking some hot items out of the oven when something burnt her and she turned her head quickly. She heard a popping noise and following this began to have significant neck pain. With time the pain improved. She then once again turned her head quickly and heard a popping noise. Her headaches returned once again. The headache involves the back of her head with no associated photophobia, phonophobia, or nausea. In addition she has migraines approximately once her week following her hysterectomy. These can last for up to two days. She has been tried on multiple medications in the past for migraine prophylaxis including propranolol, Topamax, and Elavil and Depakote. She complaints of numbness in the third and fourth fingers of her right hand at times. She has significant neck and back pain.  She continues to get nerve blocks which lasts for 3 to 4 weeks.  Hands better. .Had 5 seizure in December 2023 due to stress. Nothing since then. Started on Keppra 500 mg bid.     Chief complaint: neck pain  .    Active Ambulatory Problems     Diagnosis Date Noted    Breast lesion, right 11/22/2013    Breast mass, right 11/22/2013    Costochondral chest pain 11/22/2013    Pain, neck 06/13/2016    Migraine without status migrainosus, not intractable 06/13/2016    Headache disorder 06/13/2016    Chronic pain 07/20/2016    Voice hoarseness 01/25/2017    Nonintractable headache 05/22/2019     Resolved Ambulatory Problems     Diagnosis Date Noted    No Resolved Ambulatory Problems     Past Medical History:   Diagnosis Date    Chronic back pain     Depression     Fibromyalgia     Headache     Hyperlipidemia     Hypertension     Hypothyroidism     Neuropathy        Past Surgical History:   Procedure Laterality Date    APPENDECTOMY      [age] of [age]    HYSTERECTOMY

## 2024-01-12 ENCOUNTER — TELEPHONE (OUTPATIENT)
Dept: NEUROLOGY | Facility: CLINIC | Age: 59
End: 2024-01-12
Payer: COMMERCIAL

## 2024-01-15 ENCOUNTER — TELEPHONE (OUTPATIENT)
Dept: NEUROLOGY | Facility: CLINIC | Age: 59
End: 2024-01-15

## 2024-01-15 NOTE — TELEPHONE ENCOUNTER
attempted to call multiple times to PT and daughter about appt today to need to reschedule due to provider with family emergency.  No VM for PT and daughters VM is full

## 2024-01-22 DIAGNOSIS — R51.9 NONINTRACTABLE HEADACHE, UNSPECIFIED CHRONICITY PATTERN, UNSPECIFIED HEADACHE TYPE: ICD-10-CM

## 2024-01-22 RX ORDER — OXYCODONE AND ACETAMINOPHEN 10; 325 MG/1; MG/1
1 TABLET ORAL EVERY 6 HOURS PRN
Qty: 45 TABLET | Refills: 0 | Status: SHIPPED | OUTPATIENT
Start: 2024-01-22 | End: 2024-02-21

## 2024-01-22 NOTE — TELEPHONE ENCOUNTER
Ally Gonzalez has requested a refill on her medication.      Last office visit : 1/10/2024   Next office visit : 4/10/2024   Last medication refill : 12/22/23  Melvin : up to date       Requested Prescriptions     Pending Prescriptions Disp Refills    oxyCODONE-acetaminophen (PERCOCET)  MG per tablet 45 tablet 0     Sig: Take 1 tablet by mouth every 6 hours as needed for Pain for up to 30 days. Must last 30 days. Max Daily Amount: 4 tablets

## 2024-01-22 NOTE — TELEPHONE ENCOUNTER
Ally is calling for a medication refill that is not listed in her chart. Requesting oxycodone    Last office visit: 1/10/2024  Next office visit: 1/29/2024   Preferred Pharmacy: [facility] pharmacy    Please call patient to clarify.     Thank You.

## 2024-01-29 ENCOUNTER — HOSPITAL ENCOUNTER (OUTPATIENT)
Dept: PAIN MANAGEMENT | Age: 59
Discharge: HOME | End: 2024-01-29
Payer: MEDICARE

## 2024-01-29 VITALS
DIASTOLIC BLOOD PRESSURE: 72 MMHG | RESPIRATION RATE: 18 BRPM | HEART RATE: 57 BPM | TEMPERATURE: 96.5 F | OXYGEN SATURATION: 97 % | SYSTOLIC BLOOD PRESSURE: 121 MMHG

## 2024-01-29 PROCEDURE — 64405 NJX AA&/STRD GR OCPL NRV: CPT | Performed by: PSYCHIATRY & NEUROLOGY

## 2024-01-29 PROCEDURE — 6360000002 HC RX W HCPCS

## 2024-01-29 PROCEDURE — 64405 NJX AA&/STRD GR OCPL NRV: CPT

## 2024-01-29 RX ORDER — BUPIVACAINE HYDROCHLORIDE 2.5 MG/ML
5 INJECTION, SOLUTION EPIDURAL; INFILTRATION; INTRACAUDAL ONCE
Status: DISCONTINUED | OUTPATIENT
Start: 2024-01-29 | End: 2024-01-31 | Stop reason: HOSPADM

## 2024-01-29 RX ORDER — ETHYL CHLORIDE 100 %
AEROSOL, SPRAY (ML) TOPICAL PRN
Status: DISCONTINUED | OUTPATIENT
Start: 2024-01-29 | End: 2024-01-31 | Stop reason: HOSPADM

## 2024-01-29 NOTE — PROGRESS NOTES
[facility]  Post Procedure Discharge Instructions        YOU HAVE HAD THE FOLLOWING PROCEDURE:                                  [x] Occipital Nerve Blocks  [] CTS wrist injection(s)  [] Knee Injection(s)         [] Shoulder Injection(s)   [] Elbow Injection(s)     [] Botox Injection  [] Cervical Trigger Point Injections    [] Thoracic Trigger Point Injections    [] Lumbar Trigger Point Injections  [] Piriformis Trigger Point Injections  [] SI Joint Injection(s)     [] Trochanteric Bursa Injection(s)       [] Ankle Injection(s)   [] Plantar Fasciitis   []  ______________  Injection(s) [] Botox []  Migraines [] Spasticity    YOU HAVE RECEIVED THE FOLLOWING MEDICATIONS IN YOUR INJECTION(s)  [] Lidocaine [x] Bupivacaine   [] DepoMedrol (steroid) [] Decadron (steroid)  []  Kenalog (steroid)   [] Toradol  [] Supartz [] Zilretta    [] Botox        PATIENT INFORMATION:   You may experience the following symptoms after your procedure. These symptoms are normal and should not cause concern:    You may have an increase in your pain. This may last 24 - 48 hours after your procedure.  You may have no change in the pain that you had prior to your injection(s).  You may have weakness or numbness in your affected extremity. If this occurs, this may last until numbing the medication wears off.     REPORT THE FOLLOWING SYMPTOMS TO YOUR DOCTOR:  Redness, swelling or drainage at the injection site(s)  Unusual pain that interferes with your normal activities of daily living.    OTHER INSTRUCTIONS:    [x] I will apply ice to the injection site(s) for at least 24 hours after the procedure. I will rotate the ice on for 20 minutes and off for 20 minutes for at least 24 hours.    [x] I will not apply heat for at least 48 hours and I will not take a hot bath or shower for at least 24 hours.     [x] I understand that if Lidocaine or Bupivacaine was used in my injection(s) that the injection site(s)

## 2024-01-29 NOTE — DISCHARGE INSTRUCTIONS
[facility]  Post Procedure Discharge Instructions        YOU HAVE HAD THE FOLLOWING PROCEDURE:                                  [x] Occipital Nerve Blocks  [] CTS wrist injection(s)  [] Knee Injection(s)         [] Shoulder Injection(s)   [] Elbow Injection(s)     [] Botox Injection  [] Cervical Trigger Point Injections    [] Thoracic Trigger Point Injections    [] Lumbar Trigger Point Injections  [] Piriformis Trigger Point Injections  [] SI Joint Injection(s)     [] Trochanteric Bursa Injection(s)       [] Ankle Injection(s)   [] Plantar Fasciitis   []  ______________  Injection(s) [] Botox []  Migraines [] Spasticity    YOU HAVE RECEIVED THE FOLLOWING MEDICATIONS IN YOUR INJECTION(s)  [] Lidocaine [x] Bupivacaine   [] DepoMedrol (steroid) [] Decadron (steroid)  []  Kenalog (steroid)   [] Toradol  [] Supartz [] Zilretta    [] Botox        PATIENT INFORMATION:   You may experience the following symptoms after your procedure. These symptoms are normal and should not cause concern:    You may have an increase in your pain. This may last 24 - 48 hours after your procedure.  You may have no change in the pain that you had prior to your injection(s).  You may have weakness or numbness in your affected extremity. If this occurs, this may last until numbing the medication wears off.     REPORT THE FOLLOWING SYMPTOMS TO YOUR DOCTOR:  Redness, swelling or drainage at the injection site(s)  Unusual pain that interferes with your normal activities of daily living.    OTHER INSTRUCTIONS:    [x] I will apply ice to the injection site(s) for at least 24 hours after the procedure. I will rotate the ice on for 20 minutes and off for 20 minutes for at least 24 hours.    [x] I will not apply heat for at least 48 hours and I will not take a hot bath or shower for at least 24 hours.     [x] I understand that if Lidocaine or Bupivacaine was used in my injection(s) that the injection site(s)

## 2024-02-20 DIAGNOSIS — R51.9 NONINTRACTABLE HEADACHE, UNSPECIFIED CHRONICITY PATTERN, UNSPECIFIED HEADACHE TYPE: ICD-10-CM

## 2024-02-20 RX ORDER — OXYCODONE AND ACETAMINOPHEN 10; 325 MG/1; MG/1
1 TABLET ORAL EVERY 6 HOURS PRN
Qty: 45 TABLET | Refills: 0 | Status: SHIPPED | OUTPATIENT
Start: 2024-02-21 | End: 2024-03-22

## 2024-02-20 NOTE — TELEPHONE ENCOUNTER
Requested Prescriptions     Pending Prescriptions Disp Refills    oxyCODONE-acetaminophen (PERCOCET)  MG per tablet [Pharmacy Med Name: OXYCODONE/ACETAMINOPHEN 10-325MG TABLET] 45 tablet 0     Sig: Take 1 tablet by mouth every 6 hours as needed for Pain for up to 30 days. Max Daily Amount: 4 tablets       Last Office Visit:  1/10/2024  Next Office Visit:  4/10/2024  Last Medication Refill:  1/22/2024 with 0 MICHELLE Charles up to date:  12/20/2023    *RX updated to reflect   2/21/2024  fill date*

## 2024-03-19 DIAGNOSIS — R51.9 NONINTRACTABLE HEADACHE, UNSPECIFIED CHRONICITY PATTERN, UNSPECIFIED HEADACHE TYPE: ICD-10-CM

## 2024-03-19 RX ORDER — OXYCODONE AND ACETAMINOPHEN 10; 325 MG/1; MG/1
1 TABLET ORAL EVERY 6 HOURS PRN
Qty: 45 TABLET | Refills: 0 | Status: SHIPPED | OUTPATIENT
Start: 2024-03-22 | End: 2024-04-21

## 2024-03-19 NOTE — TELEPHONE ENCOUNTER
Requested Prescriptions     Pending Prescriptions Disp Refills    oxyCODONE-acetaminophen (PERCOCET)  MG per tablet [Pharmacy Med Name: OXYCODONE/ACETAMINOPHEN 10-325MG TABLET] 45 tablet 0     Sig: TAKE ONE (1) TABLET BY MOUTH EVERY SIX (6) HOURS AS NEEDED FOR PAIN FOR UP TO 30 DAYS. MAX DAILY AMOUNT: 4 TABLETS       Last Office Visit:  1/10/2024  Next Office Visit:  5/31/2024  Last Medication Refill:  2/21/2024 with 0 MICHELLE Charles up to date:  3/19/2024     *RX updated to reflect   3/22/2024   fill date*

## 2024-04-01 DIAGNOSIS — R51.9 NONINTRACTABLE HEADACHE, UNSPECIFIED CHRONICITY PATTERN, UNSPECIFIED HEADACHE TYPE: ICD-10-CM

## 2024-04-01 RX ORDER — OXYCODONE AND ACETAMINOPHEN 10; 325 MG/1; MG/1
TABLET ORAL
Qty: 45 TABLET | Refills: 0 | OUTPATIENT
Start: 2024-04-01

## 2024-04-15 DIAGNOSIS — M54.2 PAIN, NECK: ICD-10-CM

## 2024-04-15 DIAGNOSIS — R51.9 NONINTRACTABLE HEADACHE, UNSPECIFIED CHRONICITY PATTERN, UNSPECIFIED HEADACHE TYPE: Primary | ICD-10-CM

## 2024-04-15 RX ORDER — BUTALBITAL, ACETAMINOPHEN, CAFFEINE AND CODEINE PHOSPHATE 50; 325; 40; 30 MG/1; MG/1; MG/1; MG/1
CAPSULE ORAL
Qty: 120 CAPSULE | Refills: 5 | Status: SHIPPED | OUTPATIENT
Start: 2024-04-15 | End: 2024-05-15

## 2024-04-15 RX ORDER — CARISOPRODOL 350 MG/1
350 TABLET ORAL 3 TIMES DAILY PRN
Qty: 90 TABLET | Refills: 5 | Status: SHIPPED | OUTPATIENT
Start: 2024-04-15 | End: 2024-05-15

## 2024-04-15 NOTE — TELEPHONE ENCOUNTER
Ally Gonzalez has requested a refill on her medication.      Last office visit : 1/10/2024   Next office visit : 7/3/2024   Last medication refill : 3/12/24  Melvin : up to date       Requested Prescriptions     Pending Prescriptions Disp Refills    butalbital-acetaminophen-caffeine-codeine (FIORICET WITH CODEINE) -76-30 MG per capsule 120 capsule 5     Sig: TAKE (1) CAPSULE EVERY SIX HOURS AS NEEDED.    carisoprodol (SOMA) 350 MG tablet 90 tablet 5     Sig: Take 1 tablet by mouth 3 times daily as needed for Muscle spasms for up to 30 days. Max Daily Amount: 1,050 mg

## 2024-04-23 DIAGNOSIS — R51.9 NONINTRACTABLE HEADACHE, UNSPECIFIED CHRONICITY PATTERN, UNSPECIFIED HEADACHE TYPE: ICD-10-CM

## 2024-04-23 NOTE — TELEPHONE ENCOUNTER
Ally is requesting a refill of her : -        oxyCODONE-acetaminophen (PERCOCET)  MG per tablet       Last Appt:  1/10/2024  Next Appt:   7/3/2024  Preferred pharmacy: [facility] - [place] - [address] - [address] [phone] - F [phone]

## 2024-04-23 NOTE — TELEPHONE ENCOUNTER
Ally Gonzalez has requested a refill on her medication.      Last office visit : 1/10/2024   Next office visit : 7/3/2024   Last medication refill : 3/22/24  Melvin : up to date       Requested Prescriptions     Pending Prescriptions Disp Refills    oxyCODONE-acetaminophen (PERCOCET)  MG per tablet 45 tablet 0     Sig: Take 1 tablet by mouth every 6 hours as needed for Pain for up to 30 days. Max Daily Amount: 4 tablets         Dr. Sanz patient

## 2024-04-24 RX ORDER — OXYCODONE AND ACETAMINOPHEN 10; 325 MG/1; MG/1
1 TABLET ORAL EVERY 6 HOURS PRN
Qty: 45 TABLET | Refills: 0 | Status: SHIPPED | OUTPATIENT
Start: 2024-04-24 | End: 2024-05-24

## 2024-05-22 DIAGNOSIS — R51.9 NONINTRACTABLE HEADACHE, UNSPECIFIED CHRONICITY PATTERN, UNSPECIFIED HEADACHE TYPE: ICD-10-CM

## 2024-05-22 NOTE — TELEPHONE ENCOUNTER
Requested Prescriptions     Pending Prescriptions Disp Refills    oxyCODONE-acetaminophen (PERCOCET)  MG per tablet [Pharmacy Med Name: OXYCODONE/ACETAMINOPHEN 10-325MG TABLET] 45 tablet 0     Sig: Take 1 tablet by mouth every 6 hours as needed for Pain for up to 30 days. Max Daily Amount: 4 tablets       Last Office Visit:  1/10/2024  Next Office Visit:  7/3/2024  Last Medication Refill:  4/24/24  Melvin up to date:  4/2024    *RX updated to reflect   5/24/24  fill date*

## 2024-05-23 RX ORDER — OXYCODONE AND ACETAMINOPHEN 10; 325 MG/1; MG/1
1 TABLET ORAL EVERY 6 HOURS PRN
Qty: 45 TABLET | Refills: 0 | Status: SHIPPED | OUTPATIENT
Start: 2024-05-23 | End: 2024-06-22

## 2024-06-21 DIAGNOSIS — R51.9 NONINTRACTABLE HEADACHE, UNSPECIFIED CHRONICITY PATTERN, UNSPECIFIED HEADACHE TYPE: ICD-10-CM

## 2024-06-21 RX ORDER — OXYCODONE AND ACETAMINOPHEN 10; 325 MG/1; MG/1
1 TABLET ORAL EVERY 6 HOURS PRN
Qty: 45 TABLET | Refills: 0 | Status: SHIPPED | OUTPATIENT
Start: 2024-06-22 | End: 2024-07-22

## 2024-06-21 NOTE — TELEPHONE ENCOUNTER
Requested Prescriptions     Pending Prescriptions Disp Refills    oxyCODONE-acetaminophen (PERCOCET)  MG per tablet [Pharmacy Med Name: OXYCODONE/ACETAMINOPHEN 10-325MG TABLET] 45 tablet 0     Sig: TAKE ONE (1) TABLET BY MOUTH EVERY SIX (6) HOURS AS NEEDED FOR PAIN FOR UP TO 30 DAYS. MAX DAILY AMOUNT: 4 TABLETS       Last Office Visit:  1/10/2024  Next Office Visit:  7/3/2024  Last Medication Refill:  5/23/24  Melvin up to date:  4/15/24    *RX updated to reflect   6/22/24  fill date*

## 2024-07-03 ENCOUNTER — OFFICE VISIT (OUTPATIENT)
Dept: NEUROLOGY | Age: 59
End: 2024-07-03
Payer: MEDICARE

## 2024-07-03 VITALS
HEIGHT: 63 IN | WEIGHT: 113 LBS | HEART RATE: 119 BPM | BODY MASS INDEX: 20.02 KG/M2 | DIASTOLIC BLOOD PRESSURE: 82 MMHG | SYSTOLIC BLOOD PRESSURE: 139 MMHG

## 2024-07-03 DIAGNOSIS — G89.29 OTHER CHRONIC PAIN: ICD-10-CM

## 2024-07-03 DIAGNOSIS — M54.2 PAIN, NECK: ICD-10-CM

## 2024-07-03 DIAGNOSIS — R51.9 NONINTRACTABLE HEADACHE, UNSPECIFIED CHRONICITY PATTERN, UNSPECIFIED HEADACHE TYPE: Primary | ICD-10-CM

## 2024-07-03 DIAGNOSIS — G43.909 MIGRAINE WITHOUT STATUS MIGRAINOSUS, NOT INTRACTABLE, UNSPECIFIED MIGRAINE TYPE: ICD-10-CM

## 2024-07-03 DIAGNOSIS — R51.9 HEADACHE DISORDER: ICD-10-CM

## 2024-07-03 PROCEDURE — 99213 OFFICE O/P EST LOW 20 MIN: CPT | Performed by: PSYCHIATRY & NEUROLOGY

## 2024-07-03 RX ORDER — PROMETHAZINE HYDROCHLORIDE 25 MG/1
25 TABLET ORAL EVERY EVENING
Qty: 60 TABLET | Refills: 5 | Status: SHIPPED | OUTPATIENT
Start: 2024-07-03 | End: 2024-08-02

## 2024-07-03 NOTE — PROGRESS NOTES
Review of Systems    Constitutional - No fever or chills.  No diaphoresis or significant fatigue.  HENT -  No tinnitus or significant hearing loss.  Eyes - no sudden vision change or eye pain  Respiratory - no significant shortness of breath or cough  Cardiovascular - no chest pain No palpitations or significant leg swelling  Gastrointestinal - no abdominal swelling or pain.    Genitourinary - No difficulty urinating, dysuria  Musculoskeletal - no back pain or myalgia.  Skin - no color change or rash  Neurologic - No seizures.  No lateralizing weakness.  Hematologic - no easy bruising or excessive bleeding.  Psychiatric - no severe anxiety or nervousness.   All other review of systems are negative.    
provides any benefit.She is getting her nerve blocks. We discussed referral to pain management but she did not wish to pursue this. She did not wish to initiate any new medications. Her venous US of the legs were unremarkable. Her MRI of the C and LS spine was stable. The patient indicated understanding of the diagnosis and treatment plan. She is to continue Keppra. She is to follow up with me in approximately 3 month and call with any further problems. Continue present care as overall stable.     Continue present care    Plan    No orders of the defined types were placed in this encounter.        Orders Placed This Encounter   Medications    promethazine (PHENERGAN) 25 MG tablet     Sig: Take 1 tablet by mouth every evening     Dispense:  60 tablet     Refill:  5         Return in about 3 months (around 10/3/2024).

## 2024-07-17 ENCOUNTER — APPOINTMENT (OUTPATIENT)
Dept: CT IMAGING | Facility: HOSPITAL | Age: 59
End: 2024-07-17
Payer: MEDICARE

## 2024-07-17 ENCOUNTER — HOSPITAL ENCOUNTER (EMERGENCY)
Facility: HOSPITAL | Age: 59
Discharge: HOME | End: 2024-07-17
Attending: STUDENT IN AN ORGANIZED HEALTH CARE EDUCATION/TRAINING PROGRAM
Payer: MEDICARE

## 2024-07-17 ENCOUNTER — APPOINTMENT (OUTPATIENT)
Dept: GENERAL RADIOLOGY | Facility: HOSPITAL | Age: 59
End: 2024-07-17
Payer: MEDICARE

## 2024-07-17 VITALS
HEIGHT: 63 IN | RESPIRATION RATE: 18 BRPM | DIASTOLIC BLOOD PRESSURE: 91 MMHG | SYSTOLIC BLOOD PRESSURE: 170 MMHG | OXYGEN SATURATION: 92 % | TEMPERATURE: 97.5 F | BODY MASS INDEX: 21.09 KG/M2 | WEIGHT: 119 LBS | HEART RATE: 59 BPM

## 2024-07-17 DIAGNOSIS — R10.9 ABDOMINAL PAIN, UNSPECIFIED ABDOMINAL LOCATION: Primary | ICD-10-CM

## 2024-07-17 LAB
ALBUMIN SERPL-MCNC: 4.3 G/DL (ref 3.5–5.2)
ALBUMIN/GLOB SERPL: 1.4 G/DL
ALP SERPL-CCNC: 109 U/L (ref 39–117)
ALT SERPL W P-5'-P-CCNC: 8 U/L (ref 1–33)
ANION GAP SERPL CALCULATED.3IONS-SCNC: 12 MMOL/L (ref 5–15)
AST SERPL-CCNC: 14 U/L (ref 1–32)
BASOPHILS # BLD AUTO: 0.03 10*3/MM3 (ref 0–0.2)
BASOPHILS NFR BLD AUTO: 0.4 % (ref 0–1.5)
BILIRUB SERPL-MCNC: 0.2 MG/DL (ref 0–1.2)
BILIRUB UR QL STRIP: NEGATIVE
BUN SERPL-MCNC: 5 MG/DL (ref 6–20)
BUN/CREAT SERPL: 9.8 (ref 7–25)
CALCIUM SPEC-SCNC: 9.4 MG/DL (ref 8.6–10.5)
CHLORIDE SERPL-SCNC: 97 MMOL/L (ref 98–107)
CLARITY UR: CLEAR
CO2 SERPL-SCNC: 30 MMOL/L (ref 22–29)
COLOR UR: YELLOW
CREAT SERPL-MCNC: 0.51 MG/DL (ref 0.57–1)
DEPRECATED RDW RBC AUTO: 47.5 FL (ref 37–54)
EGFRCR SERPLBLD CKD-EPI 2021: 108.4 ML/MIN/1.73
EOSINOPHIL # BLD AUTO: 0.04 10*3/MM3 (ref 0–0.4)
EOSINOPHIL NFR BLD AUTO: 0.6 % (ref 0.3–6.2)
ERYTHROCYTE [DISTWIDTH] IN BLOOD BY AUTOMATED COUNT: 13.2 % (ref 12.3–15.4)
GLOBULIN UR ELPH-MCNC: 3 GM/DL
GLUCOSE SERPL-MCNC: 121 MG/DL (ref 65–99)
GLUCOSE UR STRIP-MCNC: NEGATIVE MG/DL
HCT VFR BLD AUTO: 40.4 % (ref 34–46.6)
HGB BLD-MCNC: 13.4 G/DL (ref 12–15.9)
HGB UR QL STRIP.AUTO: NEGATIVE
IMM GRANULOCYTES # BLD AUTO: 0.01 10*3/MM3 (ref 0–0.05)
IMM GRANULOCYTES NFR BLD AUTO: 0.1 % (ref 0–0.5)
KETONES UR QL STRIP: NEGATIVE
LEUKOCYTE ESTERASE UR QL STRIP.AUTO: NEGATIVE
LIPASE SERPL-CCNC: 11 U/L (ref 13–60)
LYMPHOCYTES # BLD AUTO: 2.57 10*3/MM3 (ref 0.7–3.1)
LYMPHOCYTES NFR BLD AUTO: 38.5 % (ref 19.6–45.3)
MAGNESIUM SERPL-MCNC: 1.9 MG/DL (ref 1.6–2.6)
MCH RBC QN AUTO: 32.6 PG (ref 26.6–33)
MCHC RBC AUTO-ENTMCNC: 33.2 G/DL (ref 31.5–35.7)
MCV RBC AUTO: 98.3 FL (ref 79–97)
MONOCYTES # BLD AUTO: 0.53 10*3/MM3 (ref 0.1–0.9)
MONOCYTES NFR BLD AUTO: 7.9 % (ref 5–12)
NEUTROPHILS NFR BLD AUTO: 3.5 10*3/MM3 (ref 1.7–7)
NEUTROPHILS NFR BLD AUTO: 52.5 % (ref 42.7–76)
NITRITE UR QL STRIP: NEGATIVE
NRBC BLD AUTO-RTO: 0 /100 WBC (ref 0–0.2)
PH UR STRIP.AUTO: 5.5 [PH] (ref 5–8)
PLATELET # BLD AUTO: 316 10*3/MM3 (ref 140–450)
PMV BLD AUTO: 9.7 FL (ref 6–12)
POTASSIUM SERPL-SCNC: 3.5 MMOL/L (ref 3.5–5.2)
PROT SERPL-MCNC: 7.3 G/DL (ref 6–8.5)
PROT UR QL STRIP: NEGATIVE
RBC # BLD AUTO: 4.11 10*6/MM3 (ref 3.77–5.28)
SODIUM SERPL-SCNC: 139 MMOL/L (ref 136–145)
SP GR UR STRIP: 1.02 (ref 1–1.03)
UROBILINOGEN UR QL STRIP: NORMAL
WBC NRBC COR # BLD AUTO: 6.68 10*3/MM3 (ref 3.4–10.8)

## 2024-07-17 PROCEDURE — 74177 CT ABD & PELVIS W/CONTRAST: CPT

## 2024-07-17 PROCEDURE — 25510000001 IOPAMIDOL 61 % SOLUTION: Performed by: STUDENT IN AN ORGANIZED HEALTH CARE EDUCATION/TRAINING PROGRAM

## 2024-07-17 PROCEDURE — 85025 COMPLETE CBC W/AUTO DIFF WBC: CPT | Performed by: STUDENT IN AN ORGANIZED HEALTH CARE EDUCATION/TRAINING PROGRAM

## 2024-07-17 PROCEDURE — 99285 EMERGENCY DEPT VISIT HI MDM: CPT

## 2024-07-17 PROCEDURE — 83735 ASSAY OF MAGNESIUM: CPT | Performed by: STUDENT IN AN ORGANIZED HEALTH CARE EDUCATION/TRAINING PROGRAM

## 2024-07-17 PROCEDURE — 80053 COMPREHEN METABOLIC PANEL: CPT | Performed by: STUDENT IN AN ORGANIZED HEALTH CARE EDUCATION/TRAINING PROGRAM

## 2024-07-17 PROCEDURE — 71045 X-RAY EXAM CHEST 1 VIEW: CPT

## 2024-07-17 PROCEDURE — 81003 URINALYSIS AUTO W/O SCOPE: CPT | Performed by: STUDENT IN AN ORGANIZED HEALTH CARE EDUCATION/TRAINING PROGRAM

## 2024-07-17 PROCEDURE — 83690 ASSAY OF LIPASE: CPT | Performed by: STUDENT IN AN ORGANIZED HEALTH CARE EDUCATION/TRAINING PROGRAM

## 2024-07-17 PROCEDURE — 36415 COLL VENOUS BLD VENIPUNCTURE: CPT

## 2024-07-17 PROCEDURE — 93005 ELECTROCARDIOGRAM TRACING: CPT | Performed by: STUDENT IN AN ORGANIZED HEALTH CARE EDUCATION/TRAINING PROGRAM

## 2024-07-17 RX ORDER — AZITHROMYCIN 500 MG/1
500 TABLET, FILM COATED ORAL DAILY
Qty: 3 TABLET | Refills: 0 | Status: SHIPPED | OUTPATIENT
Start: 2024-07-17 | End: 2024-07-20

## 2024-07-17 RX ADMIN — IOPAMIDOL 100 ML: 612 INJECTION, SOLUTION INTRAVENOUS at 10:47

## 2024-07-17 NOTE — DISCHARGE INSTRUCTIONS
Take the azithromycin once a day for 3 days for colitis.  Follow-up with your doctor if you do not feel improved.    Please return if you have worsening chest or abdominal pain, fever, lightheadedness, passing out, inability to tolerate food or water, or other emergent concerns. Otherwise please follow-up with your primary care doctor regarding your ER visit today.

## 2024-07-17 NOTE — ED PROVIDER NOTES
Subjective   History of Present Illness  Patient presents multiple complaints.  In triage she complained of chest pain.  She does not mention this on my initial HPI.  On review of systems she complains of anterior shooting chest pain that lasted all night.  She took a friend's Xanax and went away this morning.  No diaphoresis or nausea with it.  No history of CAD.    She complains of a month of abdominal pain.  Left-sided shoots across to the right side.  Constant, has never gone away.  She says she has seen a doctor and was told to follow-up.  Does not offer descriptor of the pain stating that it just hurts.  No changes other than gradual worsening.  Complains of dysuria, complains of frequency.  Says that she has a kidney infection based on her symptoms, but was told that her urinalysis was normal and was refused antibiotics.  Normal defecation.    Review of systems positive for shortness of breath but she says she feels all the time, felt that all last night, has been going on for a month, has not changed.  When asked about fever she says yes, does not take her temperature, says that the fevers have been every day for a month, unchanged; says that she has a midnight, and does not note feeling particularly hot or cold chills but answers yes to the question.    Review of Systems   Constitutional:  Negative for fever.   Respiratory:  Negative for cough and shortness of breath.    Cardiovascular:  Positive for chest pain. Negative for palpitations.   Gastrointestinal:  Negative for abdominal pain and vomiting.   Genitourinary:  Positive for dysuria and frequency.   Neurological:  Negative for syncope and light-headedness.       Past Medical History:   Diagnosis Date    Fibromyalgia     Hyperlipidemia     Hypertension     Hypothyroidism     Low back pain     Migraine     Neuropathy        Allergies   Allergen Reactions    Morphine Hives       Past Surgical History:   Procedure Laterality Date    APPENDECTOMY       HYSTERECTOMY      OOPHORECTOMY         Family History   Problem Relation Age of Onset    Cancer Mother     Diabetes Mother     Cancer Father        Social History     Socioeconomic History    Marital status: Single   Tobacco Use    Smoking status: Every Day     Current packs/day: 1.00     Types: Cigarettes    Smokeless tobacco: Never   Vaping Use    Vaping status: Never Used   Substance and Sexual Activity    Alcohol use: No    Drug use: No    Sexual activity: Defer           Objective   Physical Exam  Vitals reviewed.   Constitutional:       General: She is not in acute distress.  HENT:      Head: Normocephalic and atraumatic.   Eyes:      Extraocular Movements: Extraocular movements intact.      Conjunctiva/sclera: Conjunctivae normal.   Cardiovascular:      Pulses: Normal pulses.      Heart sounds: Normal heart sounds.   Pulmonary:      Effort: Pulmonary effort is normal. No respiratory distress.      Breath sounds: No wheezing.   Abdominal:      General: Abdomen is flat. There is no distension.      Tenderness: There is abdominal tenderness (RLQ and epigastrium). There is no guarding.   Musculoskeletal:         General: No swelling or tenderness.      Cervical back: Normal range of motion and neck supple.      Right lower leg: No edema.      Left lower leg: No edema.   Skin:     General: Skin is warm and dry.   Neurological:      General: No focal deficit present.      Mental Status: She is alert. Mental status is at baseline.   Psychiatric:         Behavior: Behavior normal.         Thought Content: Thought content normal.         Procedures           ED Course  ED Course as of 07/17/24 1151   Wed Jul 17, 2024   0947 EKG: sinus rhythm, no focal ischemic changes, no arrhythmia. [AS]   1109 Possible sigmoid colitis will discuss r/b of tx with pt otherwise cardiac workup nl HEART score low labs reassuring [AS]      ED Course User Index  [AS] London Hazel MD                                              Medical Decision Making  Problems Addressed:  Abdominal pain, unspecified abdominal location: complicated acute illness or injury    Amount and/or Complexity of Data Reviewed  Labs: ordered.  Radiology: ordered.  ECG/medicine tests: ordered.    Risk  Prescription drug management.      Destiny Morelos is a 59 y.o. female with PMH above who presents to the Emergency Department with multiple complaints.  History and physical most consistent with UTI or pyelonephritis.  Will obtain urinalysis.  Does have right lower quadrant tenderness, appendicitis considered as a differential.  Given the chronicity of her symptoms, lower suspicion for other surgical emergency.  Chest pain is fairly nondescript, EKG no focal ischemic change normal sinus rhythm.  No signs or symptoms of DVT, no history of clots, no history of PE. HEART score 3.    Intussusception unlikely given lack of brief, intense episodes of pain, lack of hematochezia.  Mesenteric ischemia unlikely given abdominal tenderness on exam, no distention, pain is not out of proportion with abdominal exam.  Volvulus unlikely given VSS, no peritonitic signs, no distention.  Bowel obstruction unlikely given pt reports BM, no recent surgical history.  Cholecystitis or ascending cholangitis unlikely given ttp is not focally in RUQ, pain not worse with eating, pt afebrile.  Pancreatitis unlikely as the patient has a normal lipase.  Diverticulitis less likely given the lack of change in bowel habits, focal LLQ abdominal pain.  No hernia palpated on exam.  Inflammatory Bowel Disease unlikely given pain is not episodic, no history of autoimmune disease, no recurrent diarrhea or bloody diarrhea on history.  Abdominal aortic aneurysm unlikely as VSS, no hypotension, no palpable abdominal mass.    Pyelonephritis or UTI unlikely given lack of fever, no dysuria, urgency, or other UTI symptoms. Urinalysis without signs of acute UTI.  Ureterolithiasis unlikely given pain is not colicky,  pt has abdominal tenderness on exam, no h/o nephrolithiasis.  No fever to suggest tubal ovarian abscess.   Pt is not in extremis, VSS, pain not abrupt in onset, pain not isolated to lower abdomen; ovarian torsion unlikely.      ED Course:   -See ED course.  Discussed that given her suprapubic tenderness and pressure, with the finding colitis, azithromycin is reasonable although she has normal white count, no fever, not clearly diagnostic of infectious illness.  She would like to try the azithromycin prescription.  No chest pain within 2 hours prior to the troponin draw therefore repeat is not indicated.  Heart score is low.  Advised outpatient primary care follow-up and gave return precautions.      Final diagnosis: Abdominal pain    All questions answered. Patient/family was understanding and in agreement with today's assessment and plan. The patient was monitored during their stay in the ED and dispositioned without acute event.    Electronically signed by:  London Hazel MD 7/17/2024 11:51 CDT      Note: Dragon medical dictation software was used in the creation of this note.      Final diagnoses:   Abdominal pain, unspecified abdominal location       ED Disposition  ED Disposition       ED Disposition   Discharge    Condition   Stable    Comment   --               London Rascon MD  [address]  [facility] [address]  [phone]               Medication List        New Prescriptions      azithromycin 500 MG tablet  Commonly known as: ZITHROMAX  Take 1 tablet by mouth Daily for 3 days.               Where to Get Your Medications        These medications were sent to [facility] - [place], [place] - [address] - [phone]  - [phone] FX  [address] [place] [address]      Phone: [phone]   azithromycin 500 MG tablet            London Hazel MD  07/17/24 3278

## 2024-07-18 LAB
QT INTERVAL: 348 MS
QTC INTERVAL: 439 MS

## 2024-07-22 DIAGNOSIS — R51.9 NONINTRACTABLE HEADACHE, UNSPECIFIED CHRONICITY PATTERN, UNSPECIFIED HEADACHE TYPE: ICD-10-CM

## 2024-07-22 RX ORDER — OXYCODONE AND ACETAMINOPHEN 10; 325 MG/1; MG/1
TABLET ORAL
Qty: 45 TABLET | Refills: 0 | OUTPATIENT
Start: 2024-07-22

## 2024-07-23 DIAGNOSIS — R51.9 NONINTRACTABLE HEADACHE, UNSPECIFIED CHRONICITY PATTERN, UNSPECIFIED HEADACHE TYPE: ICD-10-CM

## 2024-07-23 RX ORDER — OXYCODONE AND ACETAMINOPHEN 10; 325 MG/1; MG/1
1 TABLET ORAL EVERY 6 HOURS PRN
Qty: 45 TABLET | Refills: 0 | Status: SHIPPED | OUTPATIENT
Start: 2024-07-23 | End: 2024-08-22

## 2024-07-23 NOTE — TELEPHONE ENCOUNTER
Requested Prescriptions     Pending Prescriptions Disp Refills    oxyCODONE-acetaminophen (PERCOCET)  MG per tablet [Pharmacy Med Name: OXYCODONE/ACETAMINOPHEN 10-325MG TABLET] 45 tablet 0     Sig: Take 1 tablet by mouth every 6 hours as needed for Pain for up to 30 days. Max Daily Amount: 4 tablets       Last Office Visit:  7/3/2024  Next Office Visit:  10/17/2024  Last Medication Refill:  6/22/2024  Melvin up to date:  7/23/2024    *RX updated to reflect   7/23/2024  fill date*

## 2024-07-29 ENCOUNTER — HOSPITAL ENCOUNTER (OUTPATIENT)
Dept: PAIN MANAGEMENT | Age: 59
Discharge: HOME | End: 2024-07-29
Payer: MEDICARE

## 2024-07-29 VITALS
DIASTOLIC BLOOD PRESSURE: 97 MMHG | SYSTOLIC BLOOD PRESSURE: 113 MMHG | RESPIRATION RATE: 18 BRPM | HEART RATE: 88 BPM | TEMPERATURE: 97.5 F | OXYGEN SATURATION: 95 %

## 2024-07-29 DIAGNOSIS — G89.29 OTHER CHRONIC PAIN: ICD-10-CM

## 2024-07-29 PROCEDURE — 6360000002 HC RX W HCPCS

## 2024-07-29 PROCEDURE — 64405 NJX AA&/STRD GR OCPL NRV: CPT | Performed by: PSYCHIATRY & NEUROLOGY

## 2024-07-29 PROCEDURE — 64405 NJX AA&/STRD GR OCPL NRV: CPT

## 2024-07-29 RX ORDER — BUPIVACAINE HYDROCHLORIDE 2.5 MG/ML
5 INJECTION, SOLUTION EPIDURAL; INFILTRATION; INTRACAUDAL ONCE
Status: DISCONTINUED | OUTPATIENT
Start: 2024-07-29 | End: 2024-07-31 | Stop reason: HOSPADM

## 2024-07-29 RX ORDER — CARISOPRODOL 350 MG/1
TABLET ORAL
Qty: 90 TABLET | Refills: 5 | Status: SHIPPED | OUTPATIENT
Start: 2024-07-29 | End: 2024-08-28

## 2024-07-29 NOTE — DISCHARGE INSTRUCTIONS
[facility]  Post Procedure Discharge Instructions        YOU HAVE HAD THE FOLLOWING PROCEDURE:                                  [x] Occipital Nerve Blocks  [] CTS wrist injection(s)  [] Knee Injection(s)         [] Shoulder Injection(s)   [] Elbow Injection(s)     [] Botox Injection  [] Cervical Trigger Point Injections    [] Thoracic Trigger Point Injections    [] Lumbar Trigger Point Injections  [] Piriformis Trigger Point Injections  [] SI Joint Injection(s)     [] Trochanteric Bursa Injection(s)       [] Ankle Injection(s)   [] Plantar Fasciitis   []  ______________  Injection(s) [] Botox []  Migraines [] Spasticity    YOU HAVE RECEIVED THE FOLLOWING MEDICATIONS IN YOUR INJECTION(s)  [] Lidocaine [x] Bupivacaine   [] DepoMedrol (steroid) [] Decadron (steroid)  []  Kenalog (steroid)   [] Toradol  [] Supartz [] Zilretta    [] Botox        PATIENT INFORMATION:   You may experience the following symptoms after your procedure. These symptoms are normal and should not cause concern:    You may have an increase in your pain. This may last 24 - 48 hours after your procedure.  You may have no change in the pain that you had prior to your injection(s).  You may have weakness or numbness in your affected extremity. If this occurs, this may last until numbing the medication wears off.     REPORT THE FOLLOWING SYMPTOMS TO YOUR DOCTOR:  Redness, swelling or drainage at the injection site(s)  Unusual pain that interferes with your normal activities of daily living.    OTHER INSTRUCTIONS:    [x] I will apply ice to the injection site(s) for at least 24 hours after the procedure. I will rotate the ice on for 20 minutes and off for 20 minutes for at least 24 hours.    [] I will not apply heat for at least 48 hours and I will not take a hot bath or shower for at least 24 hours.     [x] I understand that if Lidocaine or Bupivacaine was used in my injection(s) that the injection site(s)

## 2024-07-29 NOTE — PROCEDURES
Bilateral Occipital Nerve distribution.    Sterile dressing applied.    Discharge:  The patient tolerated the procedure well. There were no complications during the procedure and the patient was discharged home with discharge instructions.    The patient has been instructed to contact the office should there be any complications or questions to arise between today and their next appointment.    Plan:  [] Will return to the office in   [x] 1 month  [] 4 - 6 weeks   [] 2 months   [] 3 months for:  [] Planned Procedure   [] Procedure Follow-up   [] Office Visit    Nikolas Sanz MD, 7/29/2024 at 10:07 AM

## 2024-07-29 NOTE — PROGRESS NOTES
Procedure:  Level of Consciousness: [x]Alert [x]Oriented []Disoriented []Lethargic  Anxiety Level: [x]Calm []Anxious []Depressed []Other  Skin: []Warm [x]Dry []Cool []Moist []Intact []Other  Cardiovascular: [x]Palpitations: [x]Never []Occasionally []Frequently  Chest Pain: [x]No []Yes  Respiratory:  [x]Unlabored []Labored []Cough ([] Productive []Unproductive)  HCG Required: [x]No []Yes   Results: []Negative []Positive  Knowledge Level:        [x]Patient/Other verbalized understanding of pre-procedure instructions.        [x]Assessment of post-op care needs (transportation, responsible caregiver)        [x]Able to discuss health care problems and how to deal with it.  Factors that Affect Teaching:        Language Barrier: [x]No []Yes - why:        Hearing Loss:        [x]No []Yes            Corrective Device:  []Yes [x]No        Vision Loss:           [x]No []Yes            Corrective Device:  []Yes [x]No        Memory Loss:       [x]No []Yes            []Short Term []Long Term  Motivational Level:  [x]Asks Questions                  []Extremely Anxious       [x]Seems Interested               []Seems Uninterested                  []Denies need for Education  Risk for Injury:  []Patient oriented to person, place and time  []History of frequent falls/loss of balance  Nutritional:  []Change in appetite   []Weight Gain   []Weight Loss  Functional:  []Requires assistance with ADL's

## 2024-08-07 ENCOUNTER — TELEPHONE (OUTPATIENT)
Dept: NEUROLOGY | Age: 59
End: 2024-08-07

## 2024-08-08 NOTE — TELEPHONE ENCOUNTER
Patient called back and stated she is wanting her records for her . She stated she is needing copies of all of her xray cervical and CT cervical results. I gave her the number to reach out to medical records.

## 2024-08-14 ENCOUNTER — HOSPITAL ENCOUNTER (EMERGENCY)
Facility: HOSPITAL | Age: 59
Discharge: HOME | End: 2024-08-14
Attending: FAMILY MEDICINE
Payer: MEDICARE

## 2024-08-14 ENCOUNTER — APPOINTMENT (OUTPATIENT)
Dept: GENERAL RADIOLOGY | Facility: HOSPITAL | Age: 59
End: 2024-08-14
Payer: MEDICARE

## 2024-08-14 VITALS
BODY MASS INDEX: 19.67 KG/M2 | RESPIRATION RATE: 18 BRPM | OXYGEN SATURATION: 95 % | DIASTOLIC BLOOD PRESSURE: 86 MMHG | HEIGHT: 63 IN | SYSTOLIC BLOOD PRESSURE: 141 MMHG | WEIGHT: 111 LBS | TEMPERATURE: 98.2 F | HEART RATE: 88 BPM

## 2024-08-14 DIAGNOSIS — S42.201A CLOSED FRACTURE OF PROXIMAL END OF RIGHT HUMERUS, UNSPECIFIED FRACTURE MORPHOLOGY, INITIAL ENCOUNTER: Primary | ICD-10-CM

## 2024-08-14 PROCEDURE — 73030 X-RAY EXAM OF SHOULDER: CPT

## 2024-08-14 PROCEDURE — 99283 EMERGENCY DEPT VISIT LOW MDM: CPT

## 2024-08-14 RX ORDER — OXYCODONE AND ACETAMINOPHEN 7.5; 325 MG/1; MG/1
1 TABLET ORAL EVERY 6 HOURS PRN
Qty: 16 TABLET | Refills: 0 | Status: SHIPPED | OUTPATIENT
Start: 2024-08-14

## 2024-08-14 RX ORDER — OXYCODONE AND ACETAMINOPHEN 10; 325 MG/1; MG/1
1 TABLET ORAL ONCE
Status: COMPLETED | OUTPATIENT
Start: 2024-08-14 | End: 2024-08-14

## 2024-08-14 RX ADMIN — OXYCODONE HYDROCHLORIDE AND ACETAMINOPHEN 1 TABLET: 10; 325 TABLET ORAL at 12:43

## 2024-08-14 NOTE — ED PROVIDER NOTES
Subjective   History of Present Illness  59-year-old female is here in the emergency room with complaints of pain in her right shoulder.  She injured it the other night.  She states that she was lifting a gallon of milk and she felt some happened to her shoulder.  Patient denies any other injuries.  Patient states that she is having worsening of the shoulder with movement.      Review of Systems   All other systems reviewed and are negative.      Past Medical History:   Diagnosis Date    Fibromyalgia     Hyperlipidemia     Hypertension     Hypothyroidism     Low back pain     Migraine     Neuropathy        Allergies   Allergen Reactions    Morphine Hives       Past Surgical History:   Procedure Laterality Date    APPENDECTOMY      HYSTERECTOMY      OOPHORECTOMY         Family History   Problem Relation Age of Onset    Cancer Mother     Diabetes Mother     Cancer Father        Social History     Socioeconomic History    Marital status: Single   Tobacco Use    Smoking status: Every Day     Current packs/day: 1.00     Types: Cigarettes    Smokeless tobacco: Never   Vaping Use    Vaping status: Never Used   Substance and Sexual Activity    Alcohol use: No    Drug use: No    Sexual activity: Defer           Objective   Physical Exam  Vitals and nursing note reviewed.   Constitutional:       Appearance: Normal appearance.   HENT:      Head: Normocephalic and atraumatic.      Mouth/Throat:      Mouth: Mucous membranes are moist.   Eyes:      Extraocular Movements: Extraocular movements intact.      Pupils: Pupils are equal, round, and reactive to light.   Cardiovascular:      Rate and Rhythm: Normal rate and regular rhythm.      Heart sounds: Normal heart sounds.   Pulmonary:      Effort: Pulmonary effort is normal.      Breath sounds: Normal breath sounds.   Abdominal:      General: Bowel sounds are normal.      Palpations: Abdomen is soft.      Tenderness: There is no abdominal tenderness.   Musculoskeletal:      Right  shoulder: Tenderness and bony tenderness present. Decreased range of motion.   Skin:     General: Skin is warm and dry.   Neurological:      General: No focal deficit present.      Mental Status: She is alert and oriented to person, place, and time.   Psychiatric:         Mood and Affect: Mood normal.         Behavior: Behavior normal.         Procedures           ED Course                                           XR Shoulder 2+ View Right   Final Result   1. Mildly displaced and comminuted acute appearing fracture of the   proximal humerus with at least transverse component at the surgical neck   and vertical component at the tuberosity.       This report was signed and finalized on 8/14/2024 11:47 AM by Dr Camille Garg MD.            Medications   oxyCODONE-acetaminophen (PERCOCET)  MG per tablet 1 tablet (1 tablet Oral Given 8/14/24 1243)       Medical Decision Making  59-year-old female per the daughter had a fall couple nights ago.  She is then complained of pain in the shoulder.  Patient denies a fall.  Patient was found to have a fracture of her humerus.  I discussed the findings with Dr. Varma orthopedic surgeon on-call.  He has recommended placing the patient in a sling.  She will be scheduled to see him in his office at 12:45 PM on Friday.  All questions were answered for the patient and the family prior to discharge.  Patient was advised to follow-up with primary care provider.  Patient was advised to return to the emergency room with new or worsening symptoms.  Patient verbalized understanding was agreeable to plan as discussed.    Problems Addressed:  Closed fracture of proximal end of right humerus, unspecified fracture morphology, initial encounter: complicated acute illness or injury    Amount and/or Complexity of Data Reviewed  Independent Historian:      Details: Daughter  Radiology: ordered. Decision-making details documented in ED Course.    Risk  Prescription drug  management.        Final diagnoses:   Closed fracture of proximal end of right humerus, unspecified fracture morphology, initial encounter       ED Disposition  ED Disposition       ED Disposition   Discharge    Condition   Stable    Comment   --               London Rascon MD  [address]  [facility] [address]  [phone]    Schedule an appointment as soon as possible for a visit       Thomas Varma MD  [address] [facility] [address]  [phone]    Go on 8/16/2024  GO TO APPOINTMENT SCHEDULED AT 1245PM. PLEASE ARRIVE 15 MINUTES EARLY.    [facility] EMERGENCY DEPARTMENT  [address] [facility] [address] [phone]    As needed, If symptoms worsen         Medication List        Changed      * oxyCODONE-acetaminophen  MG per tablet  Commonly known as: PERCOCET  What changed: Another medication with the same name was added. Make sure you understand how and when to take each.     * oxyCODONE-acetaminophen 7.5-325 MG per tablet  Commonly known as: PERCOCET  Take 1 tablet by mouth Every 6 (Six) Hours As Needed for Moderate Pain or Severe Pain.  What changed: You were already taking a medication with the same name, and this prescription was added. Make sure you understand how and when to take each.           * This list has 2 medication(s) that are the same as other medications prescribed for you. Read the directions carefully, and ask your doctor or other care provider to review them with you.                   Where to Get Your Medications        These medications were sent to [facility] - [place], [place] - [address] - [phone]  - [phone]   [address] [facility] [address]      Phone: [phone]   oxyCODONE-acetaminophen 7.5-325 MG per tablet            Andres Harris MD  08/14/24 1086

## 2024-08-21 DIAGNOSIS — R51.9 NONINTRACTABLE HEADACHE, UNSPECIFIED CHRONICITY PATTERN, UNSPECIFIED HEADACHE TYPE: ICD-10-CM

## 2024-08-22 ENCOUNTER — TELEPHONE (OUTPATIENT)
Dept: NEUROLOGY | Age: 59
End: 2024-08-22

## 2024-08-22 RX ORDER — OXYCODONE AND ACETAMINOPHEN 10; 325 MG/1; MG/1
1 TABLET ORAL EVERY 6 HOURS PRN
Qty: 45 TABLET | Refills: 0 | Status: SHIPPED | OUTPATIENT
Start: 2024-08-22 | End: 2024-09-21

## 2024-08-22 NOTE — TELEPHONE ENCOUNTER
Requested Prescriptions     Pending Prescriptions Disp Refills    oxyCODONE-acetaminophen (PERCOCET)  MG per tablet [Pharmacy Med Name: OXYCODONE/ACETAMINOPHEN 10-325MG TABLET] 45 tablet 0     Sig: Take 1 tablet by mouth every 6 hours as needed for Pain for up to 30 days. Max Daily Amount: 4 tablets       Last Office Visit:  7/3/2024  Next Office Visit:  10/17/2024  Last Medication Refill:  7/23/2024   Melvin up to date:  7/23/2024    *RX updated to reflect   8/22/2024   fill date*

## 2024-08-22 NOTE — TELEPHONE ENCOUNTER
Ally has a nerve block injection with Dr Sanz on 8-26-24 at 8:30. She wants to know if she can change it to 9 AM same day. Please call her to advise         Thank you

## 2024-08-22 NOTE — TELEPHONE ENCOUNTER
I have called and spoke with patient regarding the appointment time change. Patient stated that she will keep her appointment at 830 with Dr. Sanz for her nerve blocks.    144

## 2024-08-26 ENCOUNTER — HOSPITAL ENCOUNTER (OUTPATIENT)
Dept: PAIN MANAGEMENT | Age: 59
Discharge: HOME | End: 2024-08-26
Payer: MEDICARE

## 2024-08-26 VITALS
DIASTOLIC BLOOD PRESSURE: 82 MMHG | OXYGEN SATURATION: 94 % | SYSTOLIC BLOOD PRESSURE: 121 MMHG | TEMPERATURE: 96.5 F | RESPIRATION RATE: 18 BRPM | HEART RATE: 87 BPM

## 2024-08-26 PROCEDURE — 64405 NJX AA&/STRD GR OCPL NRV: CPT | Performed by: PSYCHIATRY & NEUROLOGY

## 2024-08-26 PROCEDURE — 64405 NJX AA&/STRD GR OCPL NRV: CPT

## 2024-08-26 PROCEDURE — 6360000002 HC RX W HCPCS

## 2024-08-26 RX ORDER — BUPIVACAINE HYDROCHLORIDE 2.5 MG/ML
5 INJECTION, SOLUTION EPIDURAL; INFILTRATION; INTRACAUDAL ONCE
Status: DISCONTINUED | OUTPATIENT
Start: 2024-08-26 | End: 2024-08-28 | Stop reason: HOSPADM

## 2024-08-26 NOTE — PROCEDURES
PROCEDURE NOTE  Date: 2024   Name: Ally Gonzalez  YOB: 1965    Procedures            [facility]      Patient Name: Ally Gonzalez    : 1965    Age: 59 y.o.    Sex: female    Date: 2024    Pre-op Diagnosis: Occipital Neuralgia, Chronic Headaches, Tension Type Headaches    Post-op Diagnosis: Occipital Neuralgia, Chronic Headaches, Tension Type Headaches    Procedure: Occipital Nerve Block of the  [] Right  [] Left  [x] Bilateral Occipital Nerve    Performing Procedure: Dr. Nikolas Sanz    Previously Had Procedure:  [x] Yes   [] No    Patient Vitals for the past 24 hrs:   BP Temp Temp src Pulse Resp SpO2   24 0902 121/82 (!) 96.5 °F (35.8 °C) Temporal 87 18 94 %       Description of Procedure:    After a brief physical assessment and failure to improve with conservative measures the patient presented for a Occipital Nerve Block injection of the  [] Right   [] Left  [x] Bilateral Occipital Nerve. The indications, limitations and possible complications were discussed with the patient and the patient elected to proceed with the procedure.    After voluntary, informed and signed consent obtained the patient was placed in a sitting position.      Appropriate time out was obtained per policy.     The external occipital protuberance, superior nuchal line and occipital artery were identified with palpation. The area of maximal tenderness was palpated over the  [] Right   [] Left   [x] Bilateral Occipital Nerve region and the skin overlying this area marked.     The skin overlying the proposed injection site(s) were then prepped in a sterile fashion with Prevantics swab. Under sterile technique a 25 gauge 1 1/2 inch needle was introduced into the [] Right   [] Left   [x] Bilateral Occipital Nerve region. After a negative aspiration a solution of  5 cc of 0.25% bupivacaine into the bilateral cervical and occipital regions and fanned out over the [] Right  [] Left  [x]  Bilateral Occipital Nerve distribution.    Sterile dressing applied.    Discharge:  The patient tolerated the procedure well. There were no complications during the procedure and the patient was discharged home with discharge instructions.    The patient has been instructed to contact the office should there be any complications or questions to arise between today and their next appointment.    Plan:  [] Will return to the office in   [x] 1 month  [] 4 - 6 weeks   [] 2 months   [] 3 months for:  [] Planned Procedure   [] Procedure Follow-up   [] Office Visit    Nikolas Sanz MD, 8/26/2024 at 9:56 AM

## 2024-08-26 NOTE — DISCHARGE INSTRUCTIONS
[facility] Physical And Pain Medicine  Post Procedure Discharge Instructions        YOU HAVE HAD THE FOLLOWING PROCEDURE:                                  [x] Occipital Nerve Blocks  [] CTS wrist injection(s)  [] Knee Injection(s)         [] Shoulder Injection(s)   [] Elbow Injection(s)     [] Botox Injection  [] Cervical Trigger Point Injections    [] Thoracic Trigger Point Injections    [] Lumbar Trigger Point Injections  [] Piriformis Trigger Point Injections  [] SI Joint Injection(s)     [] Trochanteric Bursa Injection(s)       [] Ankle Injection(s)   [] Plantar Fasciitis   []  ______________  Injection(s) [] Botox []  Migraines [] Spasticity    YOU HAVE RECEIVED THE FOLLOWING MEDICATIONS IN YOUR INJECTION(s)  [] Lidocaine [x] Bupivacaine   [] DepoMedrol (steroid) [] Decadron (steroid)  []  Kenalog (steroid)   [] Toradol  [] Supartz [] Zilretta    [] Botox        PATIENT INFORMATION:   You may experience the following symptoms after your procedure. These symptoms are normal and should not cause concern:    You may have an increase in your pain. This may last 24 - 48 hours after your procedure.  You may have no change in the pain that you had prior to your injection(s).  You may have weakness or numbness in your affected extremity. If this occurs, this may last until numbing the medication wears off.     REPORT THE FOLLOWING SYMPTOMS TO YOUR DOCTOR:  Redness, swelling or drainage at the injection site(s)  Unusual pain that interferes with your normal activities of daily living.    OTHER INSTRUCTIONS:    [x] I will apply ice to the injection site(s) for at least 24 hours after the procedure. I will rotate the ice on for 20 minutes and off for 20 minutes for at least 24 hours.    [x] I will not apply heat for at least 48 hours and I will not take a hot bath or shower for at least 24 hours.     [x] I understand that if Lidocaine or Bupivacaine was used in my injection(s) that the injection site(s)  will be numb for a few hours after the procedure    [] I understand if a steroid was used it will take effect in 3 - 7 days. I understand that as the numbing medication wears off, the pain may return until the steroids start working.    [x] I understand that today I will rest for the next 24 hours and drink plenty of water.    [] For Botox for Migraines please do not wear anything constricting around the forehead for 7-10 days post injection. Examples headband, hats, or bandana    [] For Botox for Spasticity start therapy for the affected limb in two weeks.    [] Additional instructions: ______________________________________________ ___________________________________________________________________    Sedation:  Was oral sedation given?    [] Yes  [x] No    I understand that if I took an oral nerve calming medication I will not drive for  [] 24 hours after taking the medication.    [] I have received a copy of my discharge instructions and understand the above instructions to the best of my knowledge    Patient Discharged:  [x] Home  [] Hospital  [] Other  ____________________________________________    Via:  [x] Ambulatory  [] Wheelchair   [] Stretcher [] EMS       Accompanied By:   [] Significant other  [] Family Member  [] Friend   [] Hospital Staff  []  Ambulance Staff  [x] Other_______________________________________________    Plan:  [] Will return to the office in   [] 1 month   [] 3 months for:  [] Procedure Follow-up  [x] Office Visit   [x] Planned Procedure        [x] Printed AVS and given to patient.  [] Patient has mychart and does not wish for AVS to be printed.      If you have questions, problems or concerns you may call [phone] and follow the prompts

## 2024-09-18 DIAGNOSIS — R51.9 NONINTRACTABLE HEADACHE, UNSPECIFIED CHRONICITY PATTERN, UNSPECIFIED HEADACHE TYPE: ICD-10-CM

## 2024-09-18 RX ORDER — OXYCODONE AND ACETAMINOPHEN 10; 325 MG/1; MG/1
1 TABLET ORAL EVERY 6 HOURS PRN
Qty: 45 TABLET | Refills: 0 | Status: SHIPPED | OUTPATIENT
Start: 2024-09-21 | End: 2024-10-21

## 2024-09-30 ENCOUNTER — HOSPITAL ENCOUNTER (OUTPATIENT)
Dept: PAIN MANAGEMENT | Age: 59
Discharge: HOME | End: 2024-09-30
Payer: MEDICARE

## 2024-09-30 VITALS
OXYGEN SATURATION: 98 % | RESPIRATION RATE: 18 BRPM | TEMPERATURE: 97.3 F | DIASTOLIC BLOOD PRESSURE: 93 MMHG | SYSTOLIC BLOOD PRESSURE: 160 MMHG | HEART RATE: 86 BPM

## 2024-09-30 DIAGNOSIS — M54.2 PAIN, NECK: ICD-10-CM

## 2024-09-30 DIAGNOSIS — G89.29 OTHER CHRONIC PAIN: ICD-10-CM

## 2024-09-30 DIAGNOSIS — R51.9 NONINTRACTABLE HEADACHE, UNSPECIFIED CHRONICITY PATTERN, UNSPECIFIED HEADACHE TYPE: ICD-10-CM

## 2024-09-30 PROCEDURE — 64405 NJX AA&/STRD GR OCPL NRV: CPT

## 2024-09-30 PROCEDURE — 64405 NJX AA&/STRD GR OCPL NRV: CPT | Performed by: PSYCHIATRY & NEUROLOGY

## 2024-09-30 PROCEDURE — 6360000002 HC RX W HCPCS

## 2024-09-30 RX ORDER — CARISOPRODOL 350 MG/1
350 TABLET ORAL 3 TIMES DAILY PRN
Qty: 90 TABLET | Refills: 5 | Status: SHIPPED | OUTPATIENT
Start: 2024-09-30 | End: 2024-10-30

## 2024-09-30 RX ORDER — BUTALBITAL, ACETAMINOPHEN, CAFFEINE AND CODEINE PHOSPHATE 50; 325; 40; 30 MG/1; MG/1; MG/1; MG/1
CAPSULE ORAL
Qty: 120 CAPSULE | Refills: 5 | Status: SHIPPED | OUTPATIENT
Start: 2024-09-30 | End: 2024-10-19

## 2024-09-30 RX ORDER — AMITRIPTYLINE HYDROCHLORIDE 50 MG/1
50 TABLET ORAL NIGHTLY
COMMUNITY

## 2024-09-30 RX ORDER — BUPIVACAINE HYDROCHLORIDE 5 MG/ML
5 INJECTION, SOLUTION EPIDURAL; INTRACAUDAL ONCE
Status: DISCONTINUED | OUTPATIENT
Start: 2024-09-30 | End: 2024-10-02 | Stop reason: HOSPADM

## 2024-09-30 NOTE — DISCHARGE INSTRUCTIONS
[facility] Physical And Pain Medicine  Post Procedure Discharge Instructions        YOU HAVE HAD THE FOLLOWING PROCEDURE:                                  [x] Occipital Nerve Blocks  [] CTS wrist injection(s)  [] Knee Injection(s)         [] Shoulder Injection(s)   [] Elbow Injection(s)     [] Botox Injection  [] Cervical Trigger Point Injections    [] Thoracic Trigger Point Injections    [] Lumbar Trigger Point Injections  [] Piriformis Trigger Point Injections  [] SI Joint Injection(s)     [] Trochanteric Bursa Injection(s)       [] Ankle Injection(s)   [] Plantar Fasciitis   []  ______________  Injection(s) [] Botox []  Migraines [] Spasticity    YOU HAVE RECEIVED THE FOLLOWING MEDICATIONS IN YOUR INJECTION(s)  [] Lidocaine [x] Bupivacaine   [] DepoMedrol (steroid) [] Decadron (steroid)  []  Kenalog (steroid)   [] Toradol  [] Supartz [] Zilretta    [] Botox        PATIENT INFORMATION:   You may experience the following symptoms after your procedure. These symptoms are normal and should not cause concern:    You may have an increase in your pain. This may last 24 - 48 hours after your procedure.  You may have no change in the pain that you had prior to your injection(s).  You may have weakness or numbness in your affected extremity. If this occurs, this may last until numbing the medication wears off.     REPORT THE FOLLOWING SYMPTOMS TO YOUR DOCTOR:  Redness, swelling or drainage at the injection site(s)  Unusual pain that interferes with your normal activities of daily living.    OTHER INSTRUCTIONS:    [x] I will apply ice to the injection site(s) for at least 24 hours after the procedure. I will rotate the ice on for 20 minutes and off for 20 minutes for at least 24 hours.    [] I will not apply heat for at least 48 hours and I will not take a hot bath or shower for at least 24 hours.     [x] I understand that if Lidocaine or Bupivacaine was used in my injection(s) that the injection site(s)

## 2024-09-30 NOTE — PROGRESS NOTES
[facility]   [address]  [facility] [address]  [phone] p  [phone]    Procedure:  Level of Consciousness: []Alert []Oriented []Disoriented []Lethargic  Anxiety Level: []Calm []Anxious []Depressed []Other  Skin: []Warm []Dry []Cool []Moist []Intact []Other  Cardiovascular: []Palpitations: [x]Never []Occasionally []Frequently  Chest Pain: []No []Yes  Respiratory:  []Unlabored []Labored []Cough ([] Productive []Unproductive)  HCG Required: []No []Yes   Results: []Negative []Positive  Knowledge Level:        []Patient/Other verbalized understanding of pre-procedure instructions.        []Assessment of post-op care needs (transportation, responsible caregiver)        []Able to discuss health care problems and how to deal with it.  Factors that Affect Teaching:        Language Barrier: []No []Yes - why:        Hearing Loss:        []No []Yes            Corrective Device:  []Yes []No        Vision Loss:           []No []Yes            Corrective Device:  []Yes []No        Memory Loss:       []No []Yes            []Short Term []Long Term  Motivational Level:  []Asks Questions                  []Extremely Anxious       []Seems Interested               []Seems Uninterested                  []Denies need for Education  Risk for Injury:  []Patient oriented to person, place and time  []History of frequent falls/loss of balance  Nutritional:  []Change in appetite   []Weight Gain   []Weight Loss  Functional:  []Requires assistance with ADL's      Migraine  Follow up Assessment:  Patient experiences ** headaches per month  Patient states that he/she has  ** headaches out of 30 days each month.  Patient states that he/she has ** migraines out of 30 days each month.  Patient has experienced a  reduction in Migraine headaches less than 15 days per month []Yes []No  Patient has experienced a reduction in Migraine hours []Yes []No

## 2024-09-30 NOTE — PROCEDURES
PROCEDURE NOTE  Date: 2024   Name: Ally Gonzalez  YOB: 1965    Procedures                [facility]      Patient Name: Ally Gonzalez    : 1965    Age: 59 y.o.    Sex: female    Date: 2024    Pre-op Diagnosis: Occipital Neuralgia, Chronic Headaches, Tension Type Headaches    Post-op Diagnosis: Occipital Neuralgia, Chronic Headaches, Tension Type Headaches    Procedure: Occipital Nerve Block of the  [] Right  [] Left  [x] Bilateral Occipital Nerve    Performing Procedure: Dr. Nikolas Sanz    Previously Had Procedure:  [x] Yes   [] No    Patient Vitals for the past 24 hrs:   BP Temp Temp src Pulse Resp SpO2   24 0956 (!) 160/93 97.3 °F (36.3 °C) Temporal 86 18 98 %       Description of Procedure:    After a brief physical assessment and failure to improve with conservative measures the patient presented for a Occipital Nerve Block injection of the  [] Right   [] Left  [x] Bilateral Occipital Nerve. The indications, limitations and possible complications were discussed with the patient and the patient elected to proceed with the procedure.    After voluntary, informed and signed consent obtained the patient was placed in a sitting position.      Appropriate time out was obtained per policy.     The external occipital protuberance, superior nuchal line and occipital artery were identified with palpation. The area of maximal tenderness was palpated over the  [] Right   [] Left   [x] Bilateral Occipital Nerve region and the skin overlying this area marked.     The skin overlying the proposed injection site(s) were then prepped in a sterile fashion with Prevantics swab. Under sterile technique a 25 gauge 1 1/2 inch needle was introduced into the [] Right   [] Left   [x] Bilateral Occipital Nerve region. After a negative aspiration a solution of  5 cc of 0.25% bupivacaine into the bilateral cervical and occipital regions and fanned out over the [] Right  [] Left

## 2024-09-30 NOTE — PROGRESS NOTES
Procedure:  Level of Consciousness: [x]Alert []Oriented []Disoriented []Lethargic  Anxiety Level: [x]Calm []Anxious []Depressed []Other  Skin: []Warm [x]Dry []Cool []Moist []Intact []Other  Cardiovascular: [x]Palpitations: []Never []Occasionally []Frequently  Chest Pain: [x]No []Yes  Respiratory:  [x]Unlabored []Labored []Cough ([] Productive []Unproductive)  HCG Required: [x]No []Yes   Results: []Negative []Positive  Knowledge Level:        [x]Patient/Other verbalized understanding of pre-procedure instructions.        [x]Assessment of post-op care needs (transportation, responsible caregiver)        [x]Able to discuss health care problems and how to deal with it.  Factors that Affect Teaching:        Language Barrier: [x]No []Yes - why:        Hearing Loss:        [x]No []Yes            Corrective Device:  []Yes [x]No        Vision Loss:           [x]No []Yes            Corrective Device:  []Yes [x]No        Memory Loss:       [x]No []Yes            []Short Term []Long Term  Motivational Level:  [x]Asks Questions                  []Extremely Anxious       [x]Seems Interested               []Seems Uninterested                  []Denies need for Education  Risk for Injury:  [x]Patient oriented to person, place and time  []History of frequent falls/loss of balance  Nutritional:  []Change in appetite   []Weight Gain   []Weight Loss  Functional:  []Requires assistance with ADL's

## 2024-10-17 ENCOUNTER — OFFICE VISIT (OUTPATIENT)
Dept: NEUROLOGY | Age: 59
End: 2024-10-17
Payer: MEDICARE

## 2024-10-17 VITALS
BODY MASS INDEX: 20.02 KG/M2 | HEART RATE: 86 BPM | SYSTOLIC BLOOD PRESSURE: 206 MMHG | DIASTOLIC BLOOD PRESSURE: 109 MMHG | HEIGHT: 63 IN | WEIGHT: 113 LBS

## 2024-10-17 DIAGNOSIS — G43.909 MIGRAINE WITHOUT STATUS MIGRAINOSUS, NOT INTRACTABLE, UNSPECIFIED MIGRAINE TYPE: ICD-10-CM

## 2024-10-17 DIAGNOSIS — M54.2 PAIN, NECK: ICD-10-CM

## 2024-10-17 DIAGNOSIS — G89.29 OTHER CHRONIC PAIN: ICD-10-CM

## 2024-10-17 DIAGNOSIS — R51.9 NONINTRACTABLE HEADACHE, UNSPECIFIED CHRONICITY PATTERN, UNSPECIFIED HEADACHE TYPE: Primary | ICD-10-CM

## 2024-10-17 PROCEDURE — 99213 OFFICE O/P EST LOW 20 MIN: CPT | Performed by: PSYCHIATRY & NEUROLOGY

## 2024-10-17 RX ORDER — OXYCODONE AND ACETAMINOPHEN 10; 325 MG/1; MG/1
1 TABLET ORAL EVERY 6 HOURS PRN
Qty: 45 TABLET | Refills: 0 | Status: SHIPPED | OUTPATIENT
Start: 2024-10-21 | End: 2024-11-20

## 2024-10-17 NOTE — PROGRESS NOTES
Ally Gonzalez is a 59 y.o. year old female who is seen for evaluation of multiple complaints. 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 One day she was taking some hot items out of the oven when something burnt her and she turned her head quickly. She heard a popping noise and following this began to have significant neck pain. With time the pain improved. She then once again turned her head quickly and heard a popping noise. Her headaches returned once again. The headache involves the back of her head with no associated photophobia, phonophobia, or nausea. In addition she has migraines approximately once her week following her hysterectomy. These can last for up to two days. She has been tried on multiple medications in the past for migraine prophylaxis including propranolol, Topamax, and Elavil and Depakote. She complaints of numbness in the third and fourth fingers of her right hand at times. She has significant neck and back pain.  She continues to get nerve blocks which lasts for 3 to 4 weeks.  Hands better. .Had 5 seizure in December 2023 due to stress. Nothing since then. Started on Keppra 500 mg bid. Losing weight. Smell of things make her nausea. No seizures. No new issues. Overall stable.     Chief complaint: neck pain  .    Active Ambulatory Problems     Diagnosis Date Noted    Breast lesion, right 11/22/2013    Breast mass, right 11/22/2013    Costochondral chest pain 11/22/2013    Pain, neck 06/13/2016    Migraine without status migrainosus, not intractable 06/13/2016    Headache disorder 06/13/2016    Chronic pain 07/20/2016    Voice hoarseness 01/25/2017    Nonintractable headache 05/22/2019     Resolved Ambulatory Problems     Diagnosis Date Noted    No Resolved Ambulatory Problems     Past Medical History:   Diagnosis Date    Chronic back pain     Depression     Fibromyalgia     Headache     Hyperlipidemia     Hypertension     Hypothyroidism     Neuropathy        Past Surgical

## 2024-10-28 ENCOUNTER — HOSPITAL ENCOUNTER (OUTPATIENT)
Dept: PAIN MANAGEMENT | Age: 59
Discharge: HOME | End: 2024-10-28
Payer: MEDICARE

## 2024-10-28 VITALS
OXYGEN SATURATION: 92 % | DIASTOLIC BLOOD PRESSURE: 90 MMHG | RESPIRATION RATE: 18 BRPM | HEART RATE: 91 BPM | SYSTOLIC BLOOD PRESSURE: 144 MMHG | TEMPERATURE: 96.9 F

## 2024-10-28 PROCEDURE — 64405 NJX AA&/STRD GR OCPL NRV: CPT

## 2024-10-28 PROCEDURE — 64405 NJX AA&/STRD GR OCPL NRV: CPT | Performed by: PSYCHIATRY & NEUROLOGY

## 2024-10-28 PROCEDURE — 6360000002 HC RX W HCPCS

## 2024-10-28 RX ORDER — BUPIVACAINE HYDROCHLORIDE 2.5 MG/ML
30 INJECTION, SOLUTION EPIDURAL; INFILTRATION; INTRACAUDAL ONCE
Status: DISCONTINUED | OUTPATIENT
Start: 2024-10-28 | End: 2024-10-30 | Stop reason: HOSPADM

## 2024-10-28 NOTE — PROGRESS NOTES
Procedure:  Level of Consciousness: [x]Alert []Oriented []Disoriented []Lethargic  Anxiety Level: [x]Calm []Anxious []Depressed []Other  Skin: [x]Warm [x]Dry []Cool []Moist []Intact []Other  Cardiovascular: []Palpitations: []Never [x]Occasionally []Frequently  Chest Pain: [x]No []Yes  Respiratory:  [x]Unlabored []Labored []Cough ([] Productive []Unproductive)  HCG Required: [x]No []Yes   Results: []Negative []Positive  Knowledge Level:        [x]Patient/Other verbalized understanding of pre-procedure instructions.        [x]Assessment of post-op care needs (transportation, responsible caregiver)        [x]Able to discuss health care problems and how to deal with it.  Factors that Affect Teaching:        Language Barrier: [x]No []Yes - why:        Hearing Loss:        [x]No []Yes            Corrective Device:  []Yes []No        Vision Loss:           [x]No []Yes            Corrective Device:  []Yes []No        Memory Loss:       [x]No []Yes            []Short Term []Long Term  Motivational Level:  [x]Asks Questions                  []Extremely Anxious       [x]Seems Interested               []Seems Uninterested                  [x]Denies need for Education  Risk for Injury:  [x]Patient oriented to person, place and time  []History of frequent falls/loss of balance  Nutritional:  []Change in appetite   []Weight Gain   []Weight Loss  Functional:  []Requires assistance with ADL'sPatient states that he/she has ___3___ headaches out of 30 days each month.  Patient states that he/she has ___2___ migraines out of 30 days each month.monthly

## 2024-10-28 NOTE — PROCEDURES
PROCEDURE NOTE  Date: 10/28/2024   Name: Ally Gonzalez  YOB: 1965    Procedures              [facility]      Patient Name: Ally Gonzalez    : 1965    Age: 59 y.o.    Sex: female    Date: 2024    Pre-op Diagnosis: Occipital Neuralgia, Chronic Headaches, Tension Type Headaches    Post-op Diagnosis: Occipital Neuralgia, Chronic Headaches, Tension Type Headaches    Procedure: Occipital Nerve Block of the  [] Right  [] Left  [x] Bilateral Occipital Nerve    Performing Procedure: Dr. Nikolas Sanz    Previously Had Procedure:  [x] Yes   [] No    Patient Vitals for the past 24 hrs:   BP Temp Temp src Pulse Resp SpO2   10/28/24 0910 (!) 144/90 96.9 °F (36.1 °C) Temporal 91 18 92 %       Description of Procedure:    After a brief physical assessment and failure to improve with conservative measures the patient presented for a Occipital Nerve Block injection of the  [] Right   [] Left  [x] Bilateral Occipital Nerve. The indications, limitations and possible complications were discussed with the patient and the patient elected to proceed with the procedure.    After voluntary, informed and signed consent obtained the patient was placed in a sitting position.      Appropriate time out was obtained per policy.     The external occipital protuberance, superior nuchal line and occipital artery were identified with palpation. The area of maximal tenderness was palpated over the  [] Right   [] Left   [x] Bilateral Occipital Nerve region and the skin overlying this area marked.     The skin overlying the proposed injection site(s) were then prepped in a sterile fashion with [employer] swab. Under sterile technique a 25 gauge 1 1/2 inch needle was introduced into the [] Right   [] Left   [x] Bilateral Occipital Nerve region. After a negative aspiration a solution of  5 cc of 0.25% bupivacaine into the bilateral cervical and occipital regions and fanned out over the [] Right  [] Left

## 2024-11-18 DIAGNOSIS — R51.9 NONINTRACTABLE HEADACHE, UNSPECIFIED CHRONICITY PATTERN, UNSPECIFIED HEADACHE TYPE: ICD-10-CM

## 2024-11-19 RX ORDER — OXYCODONE AND ACETAMINOPHEN 10; 325 MG/1; MG/1
1 TABLET ORAL EVERY 6 HOURS PRN
Qty: 45 TABLET | Refills: 0 | Status: SHIPPED | OUTPATIENT
Start: 2024-11-20 | End: 2024-12-20

## 2024-11-19 NOTE — TELEPHONE ENCOUNTER
Requested Prescriptions     Pending Prescriptions Disp Refills    oxyCODONE-acetaminophen (PERCOCET)  MG per tablet [Pharmacy Med Name: OXYCODONE/ACETAMINOPHEN 10-325MG TABLET] 45 tablet 0     Sig: Take 1 tablet by mouth every 6 hours as needed for Pain for up to 30 days. Max Daily Amount: 4 tablets       Last Office Visit:  10/17/2024  Next Office Visit:  1/16/2025  Last Medication Refill:  10/21/2024   Melvin up to date:11/19/2024      *RX updated to reflect   11/20/2024  fill date*

## 2024-12-09 ENCOUNTER — APPOINTMENT (OUTPATIENT)
Dept: CT IMAGING | Facility: HOSPITAL | Age: 59
End: 2024-12-09
Payer: MEDICARE

## 2024-12-09 ENCOUNTER — HOSPITAL ENCOUNTER (OUTPATIENT)
Facility: HOSPITAL | Age: 59
Setting detail: OBSERVATION
LOS: 1 days | Discharge: HOME | End: 2024-12-11
Attending: STUDENT IN AN ORGANIZED HEALTH CARE EDUCATION/TRAINING PROGRAM | Admitting: FAMILY MEDICINE
Payer: MEDICARE

## 2024-12-09 ENCOUNTER — HOSPITAL ENCOUNTER (OUTPATIENT)
Dept: PAIN MANAGEMENT | Age: 59
Discharge: HOME | End: 2024-12-09
Payer: MEDICARE

## 2024-12-09 ENCOUNTER — APPOINTMENT (OUTPATIENT)
Dept: GENERAL RADIOLOGY | Facility: HOSPITAL | Age: 59
End: 2024-12-09
Payer: MEDICARE

## 2024-12-09 VITALS
DIASTOLIC BLOOD PRESSURE: 94 MMHG | HEART RATE: 79 BPM | OXYGEN SATURATION: 95 % | SYSTOLIC BLOOD PRESSURE: 147 MMHG | RESPIRATION RATE: 18 BRPM | TEMPERATURE: 97.1 F

## 2024-12-09 DIAGNOSIS — R09.02 HYPOXIA: ICD-10-CM

## 2024-12-09 DIAGNOSIS — R06.89 HYPERCAPNIA: Primary | ICD-10-CM

## 2024-12-09 LAB
ALBUMIN SERPL-MCNC: 3.6 G/DL (ref 3.5–5.2)
ALBUMIN/GLOB SERPL: 1.7 G/DL
ALP SERPL-CCNC: 86 U/L (ref 39–117)
ALT SERPL W P-5'-P-CCNC: 11 U/L (ref 1–33)
ANION GAP SERPL CALCULATED.3IONS-SCNC: 9 MMOL/L (ref 5–15)
AST SERPL-CCNC: 16 U/L (ref 1–32)
BASOPHILS # BLD AUTO: 0.03 10*3/MM3 (ref 0–0.2)
BASOPHILS NFR BLD AUTO: 0.3 % (ref 0–1.5)
BILIRUB SERPL-MCNC: <0.2 MG/DL (ref 0–1.2)
BUN SERPL-MCNC: 5 MG/DL (ref 6–20)
BUN/CREAT SERPL: 10.2 (ref 7–25)
CALCIUM SPEC-SCNC: 7.9 MG/DL (ref 8.6–10.5)
CHLORIDE SERPL-SCNC: 105 MMOL/L (ref 98–107)
CO2 SERPL-SCNC: 28 MMOL/L (ref 22–29)
CREAT SERPL-MCNC: 0.49 MG/DL (ref 0.57–1)
DEPRECATED RDW RBC AUTO: 47.1 FL (ref 37–54)
EGFRCR SERPLBLD CKD-EPI 2021: 109.4 ML/MIN/1.73
EOSINOPHIL # BLD AUTO: 0.04 10*3/MM3 (ref 0–0.4)
EOSINOPHIL NFR BLD AUTO: 0.5 % (ref 0.3–6.2)
ERYTHROCYTE [DISTWIDTH] IN BLOOD BY AUTOMATED COUNT: 12.6 % (ref 12.3–15.4)
GLOBULIN UR ELPH-MCNC: 2.1 GM/DL
GLUCOSE SERPL-MCNC: 156 MG/DL (ref 65–99)
HCT VFR BLD AUTO: 36.9 % (ref 34–46.6)
HGB BLD-MCNC: 12.1 G/DL (ref 12–15.9)
HOLD SPECIMEN: NORMAL
IMM GRANULOCYTES # BLD AUTO: 0.02 10*3/MM3 (ref 0–0.05)
IMM GRANULOCYTES NFR BLD AUTO: 0.2 % (ref 0–0.5)
LYMPHOCYTES # BLD AUTO: 2.81 10*3/MM3 (ref 0.7–3.1)
LYMPHOCYTES NFR BLD AUTO: 32.1 % (ref 19.6–45.3)
MCH RBC QN AUTO: 33.2 PG (ref 26.6–33)
MCHC RBC AUTO-ENTMCNC: 32.8 G/DL (ref 31.5–35.7)
MCV RBC AUTO: 101.4 FL (ref 79–97)
MONOCYTES # BLD AUTO: 0.4 10*3/MM3 (ref 0.1–0.9)
MONOCYTES NFR BLD AUTO: 4.6 % (ref 5–12)
NEUTROPHILS NFR BLD AUTO: 5.45 10*3/MM3 (ref 1.7–7)
NEUTROPHILS NFR BLD AUTO: 62.3 % (ref 42.7–76)
NRBC BLD AUTO-RTO: 0 /100 WBC (ref 0–0.2)
PLATELET # BLD AUTO: 218 10*3/MM3 (ref 140–450)
PMV BLD AUTO: 9.9 FL (ref 6–12)
POTASSIUM SERPL-SCNC: 3.1 MMOL/L (ref 3.5–5.2)
PROT SERPL-MCNC: 5.7 G/DL (ref 6–8.5)
RBC # BLD AUTO: 3.64 10*6/MM3 (ref 3.77–5.28)
SODIUM SERPL-SCNC: 142 MMOL/L (ref 136–145)
TROPONIN T SERPL HS-MCNC: 7 NG/L
WBC NRBC COR # BLD AUTO: 8.75 10*3/MM3 (ref 3.4–10.8)
WHOLE BLOOD HOLD COAG: NORMAL
WHOLE BLOOD HOLD SPECIMEN: NORMAL

## 2024-12-09 PROCEDURE — 84484 ASSAY OF TROPONIN QUANT: CPT | Performed by: STUDENT IN AN ORGANIZED HEALTH CARE EDUCATION/TRAINING PROGRAM

## 2024-12-09 PROCEDURE — 2580000003 HC RX 258

## 2024-12-09 PROCEDURE — 80053 COMPREHEN METABOLIC PANEL: CPT | Performed by: STUDENT IN AN ORGANIZED HEALTH CARE EDUCATION/TRAINING PROGRAM

## 2024-12-09 PROCEDURE — 85025 COMPLETE CBC W/AUTO DIFF WBC: CPT | Performed by: STUDENT IN AN ORGANIZED HEALTH CARE EDUCATION/TRAINING PROGRAM

## 2024-12-09 PROCEDURE — 70450 CT HEAD/BRAIN W/O DYE: CPT

## 2024-12-09 PROCEDURE — 99285 EMERGENCY DEPT VISIT HI MDM: CPT

## 2024-12-09 PROCEDURE — 71045 X-RAY EXAM CHEST 1 VIEW: CPT

## 2024-12-09 PROCEDURE — 64405 NJX AA&/STRD GR OCPL NRV: CPT

## 2024-12-09 PROCEDURE — 6360000002 HC RX W HCPCS

## 2024-12-09 PROCEDURE — 64615 CHEMODENERV MUSC MIGRAINE: CPT

## 2024-12-09 PROCEDURE — 85379 FIBRIN DEGRADATION QUANT: CPT | Performed by: STUDENT IN AN ORGANIZED HEALTH CARE EDUCATION/TRAINING PROGRAM

## 2024-12-09 PROCEDURE — 25810000003 SODIUM CHLORIDE 0.9 % SOLUTION: Performed by: STUDENT IN AN ORGANIZED HEALTH CARE EDUCATION/TRAINING PROGRAM

## 2024-12-09 PROCEDURE — 64615 CHEMODENERV MUSC MIGRAINE: CPT | Performed by: PSYCHIATRY & NEUROLOGY

## 2024-12-09 RX ORDER — BUPIVACAINE HYDROCHLORIDE 2.5 MG/ML
5 INJECTION, SOLUTION EPIDURAL; INFILTRATION; INTRACAUDAL ONCE
Status: DISCONTINUED | OUTPATIENT
Start: 2024-12-09 | End: 2024-12-11 | Stop reason: HOSPADM

## 2024-12-09 RX ADMIN — SODIUM CHLORIDE 1000 ML: 9 INJECTION, SOLUTION INTRAVENOUS at 23:35

## 2024-12-09 NOTE — PROGRESS NOTES
Procedure:  Level of Consciousness: [x]Alert [x]Oriented []Disoriented []Lethargic  Anxiety Level: [x]Calm []Anxious []Depressed []Other  Skin: [x]Warm [x]Dry []Cool []Moist []Intact []Other  Cardiovascular: []Palpitations: [x]Never []Occasionally []Frequently  Chest Pain: [x]No []Yes  Respiratory:  [x]Unlabored []Labored [x]Cough ([] Productive [x]Unproductive)  HCG Required: [x]No []Yes   Results: []Negative []Positive  Knowledge Level:        [x]Patient/Other verbalized understanding of pre-procedure instructions.        [x]Assessment of post-op care needs (transportation, responsible caregiver)        [x]Able to discuss health care problems and how to deal with it.  Factors that Affect Teaching:        Language Barrier: [x]No []Yes - why:        Hearing Loss:        [x]No []Yes            Corrective Device:  []Yes []No        Vision Loss:           [x]No []Yes            Corrective Device:  []Yes []No        Memory Loss:       [x]No []Yes            []Short Term []Long Term  Motivational Level:  [x]Asks Questions                  []Extremely Anxious       [x]Seems Interested               []Seems Uninterested                  [x]Denies need for Education  Risk for Injury:  [x]Patient oriented to person, place and time  []History of frequent falls/loss of balance  Nutritional:  []Change in appetite   []Weight Gain   []Weight Loss  Functional:  []Requires assistance with ADL's

## 2024-12-09 NOTE — DISCHARGE INSTRUCTIONS
[facility]  Post Procedure Discharge Instructions        YOU HAVE HAD THE FOLLOWING PROCEDURE:                                  [x] Occipital Nerve Blocks  [] CTS wrist injection(s)  [] Knee Injection(s)         [] Shoulder Injection(s)   [] Elbow Injection(s)     [] Botox Injection  [] Cervical Trigger Point Injections    [] Thoracic Trigger Point Injections    [] Lumbar Trigger Point Injections  [] Piriformis Trigger Point Injections  [] SI Joint Injection(s)     [] Trochanteric Bursa Injection(s)       [] Ankle Injection(s)   [] Plantar Fasciitis   []  ______________  Injection(s) [] Botox []  Migraines [] Spasticity    YOU HAVE RECEIVED THE FOLLOWING MEDICATIONS IN YOUR INJECTION(s)  [] Lidocaine [x] Bupivacaine   [] DepoMedrol (steroid) [] Decadron (steroid)  []  Kenalog (steroid)   [] Toradol  [] Supartz [] Zilretta    [] Botox        PATIENT INFORMATION:   You may experience the following symptoms after your procedure. These symptoms are normal and should not cause concern:    You may have an increase in your pain. This may last 24 - 48 hours after your procedure.  You may have no change in the pain that you had prior to your injection(s).  You may have weakness or numbness in your affected extremity. If this occurs, this may last until numbing the medication wears off.     REPORT THE FOLLOWING SYMPTOMS TO YOUR DOCTOR:  Redness, swelling or drainage at the injection site(s)  Unusual pain that interferes with your normal activities of daily living.    OTHER INSTRUCTIONS:    [x] I will apply ice to the injection site(s) for at least 24 hours after the procedure. I will rotate the ice on for 20 minutes and off for 20 minutes for at least 24 hours.    [x] I will not apply heat for at least 48 hours and I will not take a hot bath or shower for at least 24 hours.     [x] I understand that if Lidocaine or Bupivacaine was used in my injection(s) that the injection site(s)

## 2024-12-09 NOTE — Clinical Note
Level of Care: Telemetry [5]   Diagnosis: Acute and chronic respiratory failure with hypercapnia [132672]   Admitting Physician: ISRAEL ALBARADO [[id number]]   Attending Physician: ISRAEL ALBARADO [[id number]]   Certification: I Certify That Inpatient Hospital Services Are Medically Necessary For Greater Than 2 Midnights

## 2024-12-09 NOTE — PROCEDURES
PROCEDURE NOTE  Date: 2024   Name: Ally Gonzalez  YOB: 1965    Procedures    [facility]    Patient Name: Ally Gonzalez    : 1965                    Age: 59 y.o.    Sex: female    Date: 2024    Pre-op Diagnosis: Chronic Migraines    Post-op Diagnosis: Chronic Migraines    Procedure: Botox injections x 155 units (45 units wasted) for migraine    Performing Procedure:  Dr Nikolas Sanz    Patient Vitals for the past 24 hrs:   BP Temp Temp src Pulse Resp SpO2   24 0855 (!) 147/94 97.1 °F (36.2 °C) Temporal 79 18 95 %       Previous Injections:  Patient had 2 migraines and 11 headaches over the past month.     At least 100 units or greater of Botox was used. The patient had reduction in migraines and was able to increase their activity level post treatment with Botox    Indications:    Ally Gonzalez has been treated for problems with chronic migraine headaches. The patient was taken through a conservative course of treatment without complete resolution of symptoms. Botox injection therapy was offered and after the risks and benefits of the procedure were explained to the patient, we had agreed to proceed.    The patient was taken to the procedure room and placed in the seated position. The following muscles were identified using palpation and standard landmarks. These muscles were marked and prepped with alcohol. A total of 155 units were injected after careful aspiration and the following distribution bilaterally:  10 units in 2 sites, procerus 5 units in one site, frontalis 20 units in 4 sites, temporalis 40 unit in 8 sites, occipitals 30 units in 6 sites, cervical paraspinals 20 units in 4 sites, and trapezius 30 units in 6 sites.    Discharge:  The patient tolerated the procedure well. There were no complications during the procedure and the patient was discharged home with discharge instructions.    The patient has

## 2024-12-10 ENCOUNTER — APPOINTMENT (OUTPATIENT)
Dept: MRI IMAGING | Facility: HOSPITAL | Age: 59
End: 2024-12-10
Payer: MEDICARE

## 2024-12-10 PROBLEM — J96.22 ACUTE ON CHRONIC RESPIRATORY FAILURE WITH HYPERCAPNIA: Status: ACTIVE | Noted: 2024-12-10

## 2024-12-10 PROBLEM — J96.22 ACUTE AND CHRONIC RESPIRATORY FAILURE WITH HYPERCAPNIA: Status: ACTIVE | Noted: 2024-12-10

## 2024-12-10 LAB
AMPHET+METHAMPHET UR QL: NEGATIVE
AMPHETAMINES UR QL: NEGATIVE
ANION GAP SERPL CALCULATED.3IONS-SCNC: 9 MMOL/L (ref 5–15)
ARTERIAL PATENCY WRIST A: POSITIVE
ARTERIAL PATENCY WRIST A: POSITIVE
ATMOSPHERIC PRESS: 749 MMHG
ATMOSPHERIC PRESS: 750 MMHG
BARBITURATES UR QL SCN: POSITIVE
BASE EXCESS BLDA CALC-SCNC: 2 MMOL/L (ref 0–2)
BASE EXCESS BLDA CALC-SCNC: 3.4 MMOL/L (ref 0–2)
BDY SITE: ABNORMAL
BDY SITE: ABNORMAL
BENZODIAZ UR QL SCN: NEGATIVE
BODY TEMPERATURE: 37
BODY TEMPERATURE: 37
BUN SERPL-MCNC: 7 MG/DL (ref 6–20)
BUN/CREAT SERPL: 16.3 (ref 7–25)
BUPRENORPHINE SERPL-MCNC: NEGATIVE NG/ML
CALCIUM SPEC-SCNC: 8.7 MG/DL (ref 8.6–10.5)
CANNABINOIDS SERPL QL: NEGATIVE
CHLORIDE SERPL-SCNC: 101 MMOL/L (ref 98–107)
CO2 SERPL-SCNC: 30 MMOL/L (ref 22–29)
COCAINE UR QL: NEGATIVE
COHGB MFR BLD: 3.1 % (ref 0–5)
CREAT SERPL-MCNC: 0.43 MG/DL (ref 0.57–1)
D DIMER PPP FEU-MCNC: 0.32 MCGFEU/ML (ref 0–0.58)
DEPRECATED RDW RBC AUTO: 47.1 FL (ref 37–54)
EGFRCR SERPLBLD CKD-EPI 2021: 112.9 ML/MIN/1.73
EPAP: 6
ERYTHROCYTE [DISTWIDTH] IN BLOOD BY AUTOMATED COUNT: 12.6 % (ref 12.3–15.4)
FENTANYL UR-MCNC: NEGATIVE NG/ML
GAS FLOW AIRWAY: 2 LPM
GLUCOSE SERPL-MCNC: 123 MG/DL (ref 65–99)
HCO3 BLDA-SCNC: 30.2 MMOL/L (ref 20–26)
HCO3 BLDA-SCNC: 30.3 MMOL/L (ref 20–26)
HCT VFR BLD AUTO: 41.5 % (ref 34–46.6)
HCT VFR BLD CALC: 39.4 % (ref 38–51)
HGB BLD-MCNC: 13.2 G/DL (ref 12–15.9)
HGB BLDA-MCNC: 12.9 G/DL (ref 12–16)
INHALED O2 CONCENTRATION: 30 %
IPAP: 12
Lab: ABNORMAL
Lab: ABNORMAL
MAGNESIUM SERPL-MCNC: 2 MG/DL (ref 1.6–2.6)
MCH RBC QN AUTO: 32.1 PG (ref 26.6–33)
MCHC RBC AUTO-ENTMCNC: 31.8 G/DL (ref 31.5–35.7)
MCV RBC AUTO: 101 FL (ref 79–97)
METHADONE UR QL SCN: NEGATIVE
METHGB BLD QL: 0.8 % (ref 0–3)
MODALITY: ABNORMAL
MODALITY: ABNORMAL
OPIATES UR QL: POSITIVE
OXYCODONE UR QL SCN: NEGATIVE
OXYHGB MFR BLDV: 90.2 % (ref 94–99)
PCO2 BLDA: 54.8 MM HG (ref 35–45)
PCO2 BLDA: 64.6 MM HG (ref 35–45)
PCO2 TEMP ADJ BLD: 54.8 MM HG (ref 35–45)
PCO2 TEMP ADJ BLD: 64.6 MM HG (ref 35–45)
PCP UR QL SCN: NEGATIVE
PH BLDA: 7.28 PH UNITS (ref 7.35–7.45)
PH BLDA: 7.35 PH UNITS (ref 7.35–7.45)
PH, TEMP CORRECTED: 7.28 PH UNITS (ref 7.35–7.45)
PH, TEMP CORRECTED: 7.35 PH UNITS (ref 7.35–7.45)
PLATELET # BLD AUTO: 252 10*3/MM3 (ref 140–450)
PMV BLD AUTO: 10.2 FL (ref 6–12)
PO2 BLDA: 66.9 MM HG (ref 83–108)
PO2 BLDA: 69.1 MM HG (ref 83–108)
PO2 TEMP ADJ BLD: 66.9 MM HG (ref 83–108)
PO2 TEMP ADJ BLD: 69.1 MM HG (ref 83–108)
POTASSIUM BLDA-SCNC: 3.8 MMOL/L (ref 3.5–5.2)
POTASSIUM SERPL-SCNC: 3.4 MMOL/L (ref 3.5–5.2)
RBC # BLD AUTO: 4.11 10*6/MM3 (ref 3.77–5.28)
SAO2 % BLDCOA: 92.6 % (ref 94–99)
SAO2 % BLDCOA: 93.9 % (ref 94–99)
SET MECH RESP RATE: 16
SODIUM BLDA-SCNC: 143 MMOL/L (ref 136–145)
SODIUM SERPL-SCNC: 140 MMOL/L (ref 136–145)
TRICYCLICS UR QL SCN: POSITIVE
VENTILATOR MODE: ABNORMAL
VENTILATOR MODE: ABNORMAL
WBC NRBC COR # BLD AUTO: 7.61 10*3/MM3 (ref 3.4–10.8)

## 2024-12-10 PROCEDURE — 25010000002 HEPARIN (PORCINE) PER 1000 UNITS

## 2024-12-10 PROCEDURE — 97161 PT EVAL LOW COMPLEX 20 MIN: CPT

## 2024-12-10 PROCEDURE — 96374 THER/PROPH/DIAG INJ IV PUSH: CPT

## 2024-12-10 PROCEDURE — 85027 COMPLETE CBC AUTOMATED: CPT

## 2024-12-10 PROCEDURE — 94640 AIRWAY INHALATION TREATMENT: CPT

## 2024-12-10 PROCEDURE — 97165 OT EVAL LOW COMPLEX 30 MIN: CPT | Performed by: OCCUPATIONAL THERAPIST

## 2024-12-10 PROCEDURE — 82805 BLOOD GASES W/O2 SATURATION: CPT

## 2024-12-10 PROCEDURE — 94761 N-INVAS EAR/PLS OXIMETRY MLT: CPT

## 2024-12-10 PROCEDURE — 82803 BLOOD GASES ANY COMBINATION: CPT

## 2024-12-10 PROCEDURE — G0378 HOSPITAL OBSERVATION PER HR: HCPCS

## 2024-12-10 PROCEDURE — 94799 UNLISTED PULMONARY SVC/PX: CPT

## 2024-12-10 PROCEDURE — 25510000001 GADOPICLENOL 0.5 MMOL/ML SOLUTION: Performed by: FAMILY MEDICINE

## 2024-12-10 PROCEDURE — 83050 HGB METHEMOGLOBIN QUAN: CPT

## 2024-12-10 PROCEDURE — 96376 TX/PRO/DX INJ SAME DRUG ADON: CPT

## 2024-12-10 PROCEDURE — 94660 CPAP INITIATION&MGMT: CPT

## 2024-12-10 PROCEDURE — 80307 DRUG TEST PRSMV CHEM ANLYZR: CPT

## 2024-12-10 PROCEDURE — 6360000002 HC RX W HCPCS

## 2024-12-10 PROCEDURE — 80048 BASIC METABOLIC PNL TOTAL CA: CPT

## 2024-12-10 PROCEDURE — A9579 GAD-BASE MR CONTRAST NOS,1ML: HCPCS | Performed by: FAMILY MEDICINE

## 2024-12-10 PROCEDURE — 82375 ASSAY CARBOXYHB QUANT: CPT

## 2024-12-10 PROCEDURE — 83735 ASSAY OF MAGNESIUM: CPT

## 2024-12-10 PROCEDURE — 96375 TX/PRO/DX INJ NEW DRUG ADDON: CPT

## 2024-12-10 PROCEDURE — 25010000002 KETOROLAC TROMETHAMINE PER 15 MG: Performed by: FAMILY MEDICINE

## 2024-12-10 PROCEDURE — 70553 MRI BRAIN STEM W/O & W/DYE: CPT

## 2024-12-10 PROCEDURE — 36415 COLL VENOUS BLD VENIPUNCTURE: CPT

## 2024-12-10 PROCEDURE — 36600 WITHDRAWAL OF ARTERIAL BLOOD: CPT

## 2024-12-10 PROCEDURE — 25010000002 NALOXONE PER 1 MG: Performed by: STUDENT IN AN ORGANIZED HEALTH CARE EDUCATION/TRAINING PROGRAM

## 2024-12-10 RX ORDER — LISINOPRIL 10 MG/1
5 TABLET ORAL DAILY
Status: DISCONTINUED | OUTPATIENT
Start: 2024-12-10 | End: 2024-12-11 | Stop reason: HOSPADM

## 2024-12-10 RX ORDER — NALOXONE HCL 0.4 MG/ML
0.2 VIAL (ML) INJECTION ONCE
Status: COMPLETED | OUTPATIENT
Start: 2024-12-10 | End: 2024-12-10

## 2024-12-10 RX ORDER — IPRATROPIUM BROMIDE AND ALBUTEROL SULFATE 2.5; .5 MG/3ML; MG/3ML
3 SOLUTION RESPIRATORY (INHALATION)
Status: DISCONTINUED | OUTPATIENT
Start: 2024-12-10 | End: 2024-12-11 | Stop reason: HOSPADM

## 2024-12-10 RX ORDER — TERAZOSIN 2 MG/1
2 CAPSULE ORAL NIGHTLY
Status: DISCONTINUED | OUTPATIENT
Start: 2024-12-10 | End: 2024-12-11

## 2024-12-10 RX ORDER — SODIUM CHLORIDE 0.9 % (FLUSH) 0.9 %
10 SYRINGE (ML) INJECTION EVERY 12 HOURS SCHEDULED
Status: DISCONTINUED | OUTPATIENT
Start: 2024-12-10 | End: 2024-12-11 | Stop reason: HOSPADM

## 2024-12-10 RX ORDER — ROPINIROLE 4 MG/1
4 TABLET, FILM COATED ORAL NIGHTLY
Status: DISCONTINUED | OUTPATIENT
Start: 2024-12-10 | End: 2024-12-11 | Stop reason: HOSPADM

## 2024-12-10 RX ORDER — BUSPIRONE HYDROCHLORIDE 10 MG/1
10 TABLET ORAL 3 TIMES DAILY
COMMUNITY
Start: 2024-11-18

## 2024-12-10 RX ORDER — NALOXONE HCL 0.4 MG/ML
0.2 VIAL (ML) INJECTION
Status: DISCONTINUED | OUTPATIENT
Start: 2024-12-10 | End: 2024-12-11 | Stop reason: HOSPADM

## 2024-12-10 RX ORDER — BUTALBITAL, ACETAMINOPHEN, CAFFEINE AND CODEINE PHOSPHATE 50; 325; 40; 30 MG/1; MG/1; MG/1; MG/1
1 CAPSULE ORAL EVERY 6 HOURS PRN
COMMUNITY
Start: 2024-11-30

## 2024-12-10 RX ORDER — CLONAZEPAM 0.5 MG/1
0.5 TABLET ORAL 2 TIMES DAILY PRN
COMMUNITY
End: 2024-12-11 | Stop reason: HOSPADM

## 2024-12-10 RX ORDER — ARIPIPRAZOLE 10 MG/1
10 TABLET ORAL EVERY 24 HOURS
Status: DISCONTINUED | OUTPATIENT
Start: 2024-12-10 | End: 2024-12-11 | Stop reason: HOSPADM

## 2024-12-10 RX ORDER — LEVOTHYROXINE SODIUM 88 UG/1
88 TABLET ORAL
Status: DISCONTINUED | OUTPATIENT
Start: 2024-12-11 | End: 2024-12-11 | Stop reason: HOSPADM

## 2024-12-10 RX ORDER — PRAVASTATIN SODIUM 20 MG
20 TABLET ORAL EVERY 24 HOURS
Status: DISCONTINUED | OUTPATIENT
Start: 2024-12-10 | End: 2024-12-11 | Stop reason: HOSPADM

## 2024-12-10 RX ORDER — AMITRIPTYLINE HYDROCHLORIDE 50 MG/1
1 TABLET ORAL NIGHTLY
COMMUNITY

## 2024-12-10 RX ORDER — SODIUM CHLORIDE 9 MG/ML
40 INJECTION, SOLUTION INTRAVENOUS AS NEEDED
Status: DISCONTINUED | OUTPATIENT
Start: 2024-12-10 | End: 2024-12-11 | Stop reason: HOSPADM

## 2024-12-10 RX ORDER — NITROGLYCERIN 0.4 MG/1
0.4 TABLET SUBLINGUAL
Status: DISCONTINUED | OUTPATIENT
Start: 2024-12-10 | End: 2024-12-11 | Stop reason: HOSPADM

## 2024-12-10 RX ORDER — KETOROLAC TROMETHAMINE 30 MG/ML
30 INJECTION, SOLUTION INTRAMUSCULAR; INTRAVENOUS EVERY 6 HOURS PRN
Status: DISCONTINUED | OUTPATIENT
Start: 2024-12-10 | End: 2024-12-11 | Stop reason: HOSPADM

## 2024-12-10 RX ORDER — DOXEPIN HYDROCHLORIDE 50 MG/1
50 CAPSULE ORAL NIGHTLY
COMMUNITY
Start: 2024-08-21

## 2024-12-10 RX ORDER — BUSPIRONE HYDROCHLORIDE 10 MG/1
10 TABLET ORAL 2 TIMES DAILY
Status: DISCONTINUED | OUTPATIENT
Start: 2024-12-10 | End: 2024-12-11 | Stop reason: HOSPADM

## 2024-12-10 RX ORDER — SODIUM CHLORIDE 0.9 % (FLUSH) 0.9 %
10 SYRINGE (ML) INJECTION AS NEEDED
Status: DISCONTINUED | OUTPATIENT
Start: 2024-12-10 | End: 2024-12-11 | Stop reason: HOSPADM

## 2024-12-10 RX ORDER — HEPARIN SODIUM 5000 [USP'U]/ML
5000 INJECTION, SOLUTION INTRAVENOUS; SUBCUTANEOUS EVERY 12 HOURS SCHEDULED
Status: DISCONTINUED | OUTPATIENT
Start: 2024-12-10 | End: 2024-12-11 | Stop reason: HOSPADM

## 2024-12-10 RX ORDER — PRAZOSIN HYDROCHLORIDE 2 MG/1
2 CAPSULE ORAL NIGHTLY
COMMUNITY
Start: 2024-12-09 | End: 2024-12-11 | Stop reason: HOSPADM

## 2024-12-10 RX ORDER — IPRATROPIUM BROMIDE AND ALBUTEROL SULFATE 2.5; .5 MG/3ML; MG/3ML
3 SOLUTION RESPIRATORY (INHALATION) EVERY 6 HOURS PRN
Status: DISCONTINUED | OUTPATIENT
Start: 2024-12-10 | End: 2024-12-11 | Stop reason: HOSPADM

## 2024-12-10 RX ORDER — POTASSIUM CHLORIDE 1500 MG/1
40 TABLET, EXTENDED RELEASE ORAL ONCE
Status: COMPLETED | OUTPATIENT
Start: 2024-12-10 | End: 2024-12-10

## 2024-12-10 RX ORDER — CARISOPRODOL 350 MG/1
350 TABLET ORAL 3 TIMES DAILY PRN
COMMUNITY
Start: 2024-11-30 | End: 2024-12-11 | Stop reason: HOSPADM

## 2024-12-10 RX ORDER — NICOTINE 21 MG/24HR
1 PATCH, TRANSDERMAL 24 HOURS TRANSDERMAL EVERY 24 HOURS
Status: DISCONTINUED | OUTPATIENT
Start: 2024-12-10 | End: 2024-12-11 | Stop reason: HOSPADM

## 2024-12-10 RX ORDER — NALOXONE HCL 0.4 MG/ML
0.4 VIAL (ML) INJECTION ONCE
Status: COMPLETED | OUTPATIENT
Start: 2024-12-10 | End: 2024-12-10

## 2024-12-10 RX ORDER — ACETAMINOPHEN 325 MG/1
650 TABLET ORAL EVERY 6 HOURS PRN
Status: DISCONTINUED | OUTPATIENT
Start: 2024-12-10 | End: 2024-12-11 | Stop reason: HOSPADM

## 2024-12-10 RX ADMIN — Medication 10 ML: at 20:04

## 2024-12-10 RX ADMIN — NALOXONE HYDROCHLORIDE 0.4 MG: 0.4 INJECTION, SOLUTION INTRAMUSCULAR; INTRAVENOUS; SUBCUTANEOUS at 02:22

## 2024-12-10 RX ADMIN — KETOROLAC TROMETHAMINE 30 MG: 30 INJECTION, SOLUTION INTRAMUSCULAR; INTRAVENOUS at 22:01

## 2024-12-10 RX ADMIN — ROPINIROLE HYDROCHLORIDE 4 MG: 4 TABLET, FILM COATED ORAL at 20:03

## 2024-12-10 RX ADMIN — LISINOPRIL 5 MG: 10 TABLET ORAL at 15:20

## 2024-12-10 RX ADMIN — PRAVASTATIN SODIUM 20 MG: 20 TABLET ORAL at 20:03

## 2024-12-10 RX ADMIN — ACETAMINOPHEN 650 MG: 325 TABLET ORAL at 16:20

## 2024-12-10 RX ADMIN — GABAPENTIN 800 MG: 100 CAPSULE ORAL at 15:20

## 2024-12-10 RX ADMIN — AMITRIPTYLINE HYDROCHLORIDE 50 MG: 25 TABLET, FILM COATED ORAL at 20:04

## 2024-12-10 RX ADMIN — IPRATROPIUM BROMIDE AND ALBUTEROL SULFATE 3 ML: .5; 3 SOLUTION RESPIRATORY (INHALATION) at 06:33

## 2024-12-10 RX ADMIN — GABAPENTIN 800 MG: 100 CAPSULE ORAL at 22:01

## 2024-12-10 RX ADMIN — TERAZOSIN HYDROCHLORIDE 2 MG: 2 CAPSULE ORAL at 20:03

## 2024-12-10 RX ADMIN — IPRATROPIUM BROMIDE AND ALBUTEROL SULFATE 3 ML: .5; 3 SOLUTION RESPIRATORY (INHALATION) at 10:12

## 2024-12-10 RX ADMIN — POTASSIUM CHLORIDE 40 MEQ: 1500 TABLET, EXTENDED RELEASE ORAL at 07:46

## 2024-12-10 RX ADMIN — Medication 10 ML: at 09:48

## 2024-12-10 RX ADMIN — NALOXONE HYDROCHLORIDE 0.2 MG: 0.4 INJECTION, SOLUTION INTRAMUSCULAR; INTRAVENOUS; SUBCUTANEOUS at 01:27

## 2024-12-10 RX ADMIN — IPRATROPIUM BROMIDE AND ALBUTEROL SULFATE 3 ML: .5; 3 SOLUTION RESPIRATORY (INHALATION) at 14:31

## 2024-12-10 RX ADMIN — GADOPICLENOL 7.5 ML: 485.1 INJECTION INTRAVENOUS at 17:39

## 2024-12-10 RX ADMIN — HEPARIN SODIUM 5000 UNITS: 5000 INJECTION, SOLUTION INTRAVENOUS; SUBCUTANEOUS at 09:48

## 2024-12-10 RX ADMIN — BUSPIRONE HYDROCHLORIDE 10 MG: 10 TABLET ORAL at 20:03

## 2024-12-10 RX ADMIN — ARIPIPRAZOLE 10 MG: 10 TABLET ORAL at 20:03

## 2024-12-10 RX ADMIN — NALOXONE HYDROCHLORIDE 0.2 MG: 0.4 INJECTION, SOLUTION INTRAMUSCULAR; INTRAVENOUS; SUBCUTANEOUS at 00:51

## 2024-12-10 RX ADMIN — IPRATROPIUM BROMIDE AND ALBUTEROL SULFATE 3 ML: .5; 3 SOLUTION RESPIRATORY (INHALATION) at 19:06

## 2024-12-10 NOTE — THERAPY DISCHARGE NOTE
Acute Care - Occupational Therapy Discharge  [facility]    Patient Name: Destiny Morelos  : 1965    MRN: [id number]                              Today's Date: 12/10/2024       Admit Date: 2024    Visit Dx:     ICD-10-CM ICD-9-CM   1. Hypercapnia  R06.89 786.09   2. Hypoxia  R09.02 799.02     Patient Active Problem List   Diagnosis    Chronic pain    Migraine without status migrainosus, not intractable    Seizure    Major depression, recurrent    Hypothyroidism (acquired)    Lactic acidosis    Chronic, continuous use of opioids    Acute and chronic respiratory failure with hypercapnia    Acute on chronic respiratory failure with hypercapnia     Past Medical History:   Diagnosis Date    Fibromyalgia     Hyperlipidemia     Hypertension     Hypothyroidism     Low back pain     Migraine     Neuropathy      Past Surgical History:   Procedure Laterality Date    APPENDECTOMY      HYSTERECTOMY      OOPHORECTOMY        General Information       Row Name 12/10/24 1300          OT Time and Intention    Mode of Treatment occupational therapy  -JW     Patient Effort good  -       Row Name 12/10/24 1300          General Information    Patient Profile Reviewed yes  -JW     Prior Level of Function independent:;all household mobility;transfer;bed mobility;ADL's  -JW     Existing Precautions/Restrictions oxygen therapy device and L/min  2L  -       Row Name 12/10/24 1300          Living Environment    People in Home child(omari), adult;grandchild(omari)  -       Row Name 12/10/24 1300          Home Main Entrance    Number of Stairs, Main Entrance none  -JW     Stair Railings, Main Entrance none  -JW       Row Name 12/10/24 1300          Stairs Within Home, Primary    Number of Stairs, Within Home, Primary none  -JW     Stair Railings, Within Home, Primary none  -       Row Name 12/10/24 1300          Cognition    Orientation Status (Cognition) oriented x 4  -               User Key  (r) = Recorded By, (t) = Taken By,  (c) = Cosigned By      Initials Name Provider Type    Noris West, OTR/L, CSRS Occupational Therapist                   Mobility/ADL's       Row Name 12/10/24 1300          Bed Mobility    Bed Mobility supine-sit;sit-supine  -     Supine-Sit [place] (Bed Mobility) modified independence  -     Sit-Supine [place] (Bed Mobility) modified independence  -     Assistive Device (Bed Mobility) head of bed elevated  -       Row Name 12/10/24 1300          Transfers    Transfers sit-stand transfer;stand-sit transfer;toilet transfer  -       Row Name 12/10/24 1300          Sit-Stand Transfer    Sit-Stand [place] (Transfers) independent  -       Row Name 12/10/24 1300          Stand-Sit Transfer    Stand-Sit [place] (Transfers) independent  -       Row Name 12/10/24 1300          Toilet Transfer    Type (Toilet Transfer) sit-stand;stand-sit  -     [place] Level (Toilet Transfer) independent  -     Assistive Device (Toilet Transfer) commode  -       Row Name 12/10/24 1300          Functional Mobility    Functional Mobility- Ind. Level supervision required  -     Functional Mobility- Comment in hallways of ER. No LOB noted  -       Row Name 12/10/24 1300          Activities of Daily Living    BADL Assessment/Intervention lower body dressing;toileting;grooming  -       Row Name 12/10/24 1300          Lower Body Dressing Assessment/Training    [place] Level (Lower Body Dressing) don;socks;independent  -     Position (Lower Body Dressing) edge of bed sitting  -       Row Name 12/10/24 1300          Toileting Assessment/Training    [place] Level (Toileting) adjust/manage clothing;perform perineal hygiene;independent  -     Assistive Devices (Toileting) commode  -     Position (Toileting) unsupported sitting;unsupported standing  -       Row Name 12/10/24 1300          Grooming Assessment/Training    [place] Level (Grooming) wash face, hands  -      Position (Grooming) sink side;unsupported standing  -               User Key  (r) = Recorded By, (t) = Taken By, (c) = Cosigned By      Initials Name Provider Type     Noris Ca OTR/L, TALAT Occupational Therapist                   Obj/Interventions       Row Name 12/10/24 1300          Sensory Assessment (Somatosensory)    Sensory Assessment (Somatosensory) UE sensation intact  -[facility] 12/10/24 1300          Vision Assessment/Intervention    Visual Impairment/Limitations WFL  -JW       Row Name 12/10/24 1300          Range of Motion Comprehensive    General Range of Motion bilateral upper extremity ROM WFL  -JW       Row Name 12/10/24 1300          Strength Comprehensive (MMT)    Comment, General Manual Muscle Testing (MMT) Assessment B UE strength 4/5  -[facility] 12/10/24 1300          Balance    Balance Assessment sitting static balance;sitting dynamic balance;standing static balance;standing dynamic balance  -     Static Sitting Balance independent  -     Dynamic Sitting Balance independent  -     Position, Sitting Balance unsupported;sitting edge of bed  -     Static Standing Balance supervision  -     Dynamic Standing Balance supervision  -     Position/Device Used, Standing Balance unsupported  -               User Key  (r) = Recorded By, (t) = Taken By, (c) = Cosigned By      Initials Name Provider Type     Noris Ca OTR/L, CSRS Occupational Therapist                   Goals/Plan    No documentation.                  Clinical Impression       Row Name 12/10/24 1300          Pain Assessment    Pretreatment Pain Rating 0/10 - no pain  -     Posttreatment Pain Rating 0/10 - no pain  -JW       Row Name 12/10/24 1300          Plan of Care Review    Plan of Care Reviewed With patient  -     Outcome Evaluation OT lawanda completed. Pt presents alert and oriented x4, sitting up in bed. She reports at baseline being independent with all adls and  mobility, residing at home with family. Today she was able to perform all bed mobility, sit <> stand t/fs and adls independently. Sup only for functional mobility. At this time, pt does not display needs for skilled OT services. OT to sign off and anticipate d/c home.  -       Row Name 12/10/24 1300          Therapy Assessment/Plan (OT)    Criteria for Skilled Therapeutic Interventions Met (OT) no;does not meet criteria for skilled intervention  -     Therapy Frequency (OT) evaluation only  -       Row Name 12/10/24 1300          Therapy Plan Review/Discharge Plan (OT)    Anticipated Discharge Disposition (OT) home  -       Row Name 12/10/24 1300          Positioning and Restraints    Pre-Treatment Position in bed  -     Post Treatment Position bed  -JW     In Bed notified nsg;fowlers;call light within reach;encouraged to call for assist;side rails up x2;patient within staff view  -               User Key  (r) = Recorded By, (t) = Taken By, (c) = Cosigned By      Initials Name Provider Type    Noris West, OTR/L, CSRS Occupational Therapist                   Outcome Measures       Row Name 12/10/24 1300          How much help from another is currently needed...    Putting on and taking off regular lower body clothing? 4  -JW     Bathing (including washing, rinsing, and drying) 4  -JW     Toileting (which includes using toilet bed pan or urinal) 4  -JW     Putting on and taking off regular upper body clothing 4  -JW     Taking care of personal grooming (such as brushing teeth) 4  -JW     Eating meals 4  -JW     AM-PAC 6 Clicks Score (OT) 24  -       Row Name 12/10/24 1428 12/10/24 1304       How much help from another person do you currently need...    Turning from your back to your side while in flat bed without using bedrails? 4  -LW 4  -CHAPIN (r) AT (t) CHAPIN (c)    Moving from lying on back to sitting on the side of a flat bed without bedrails? 4  -LW 4  -CHAPIN (r) AT (t) CHAPIN (c)    Moving to and  from a bed to a chair (including a wheelchair)? 4  -LW 4  -CHAPIN (r) AT (t) CHAPIN (c)    Standing up from a chair using your arms (e.g., wheelchair, bedside chair)? 4  -LW 4  -CHAPIN (r) AT (t) CHAPIN (c)    Climbing 3-5 steps with a railing? 4  -LW 4  -CHAPIN (r) AT (t) CHAPIN (c)    To walk in hospital room? 4  -LW 4  -CHAPIN (r) AT (t) CHAPIN (c)    AM-PAC 6 Clicks Score (PT) 24  -LW 24  -CHAPIN (r) AT (t)    Highest Level of Mobility Goal 8 --> Walked 250 feet or more  -LW 8 --> Walked 250 feet or more  -CHAPIN (r) AT (t)      Row Name 12/10/24 1005          How much help from another person do you currently need...    Turning from your back to your side while in flat bed without using bedrails? 4  -SR     Moving from lying on back to sitting on the side of a flat bed without bedrails? 4  -SR     Moving to and from a bed to a chair (including a wheelchair)? 4  -SR     Standing up from a chair using your arms (e.g., wheelchair, bedside chair)? 4  -SR     Climbing 3-5 steps with a railing? 4  -SR     To walk in hospital room? 4  -SR     AM-PAC 6 Clicks Score (PT) 24  -SR     Highest Level of Mobility Goal 8 --> Walked 250 feet or more  -SR       Row Name 12/10/24 1304 12/10/24 1300       Functional Assessment    Outcome Measure Options AM-PAC 6 Clicks Basic Mobility (PT)  -CHAPIN (r) AT (t) CHAPIN (c) AM-PAC 6 Clicks Daily Activity (OT)  -              User Key  (r) = Recorded By, (t) = Taken By, (c) = Cosigned By      Initials Name Provider Type    Tyrell Wilkins, PT DPT Physical Therapist    Noris West, OTR/L, CSRS Occupational Therapist    Justine Moore, RN Registered Nurse    SR Betsy Workman, RN Registered Nurse    AT Elise Padilla, PT Student PT Student                  Occupational Therapy Education       Title: PT OT SLP Therapies (In Progress)       Topic: Occupational Therapy (In Progress)       Point: ADL training (Done)       Description:   Instruct learner(s) on proper safety adaptation and remediation techniques during  self care or transfers.   Instruct in proper use of assistive devices.                  Learning Progress Summary            Patient Acceptance, E, VU by  at 12/10/2024 1455                      Point: Home exercise program (Not Started)       Description:   Instruct learner(s) on appropriate technique for monitoring, assisting and/or progressing therapeutic exercises/activities.                  Learner Progress:  Not documented in this visit.              Point: Precautions (Done)       Description:   Instruct learner(s) on prescribed precautions during self-care and functional transfers.                  Learning Progress Summary            Patient Acceptance, E, VU by  at 12/10/2024 1455                      Point: Body mechanics (Done)       Description:   Instruct learner(s) on proper positioning and spine alignment during self-care, functional mobility activities and/or exercises.                  Learning Progress Summary            Patient Acceptance, E, VU by  at 12/10/2024 1455                                      User Key       Initials Effective Dates Name Provider Type Discipline     11/15/24 -  Noris Ca, OTR/L, CSRS Occupational Therapist OT                  OT Recommendation and Plan  Therapy Frequency (OT): evaluation only  Plan of Care Review  Plan of Care Reviewed With: patient  Outcome Evaluation: OT eval completed. Pt presents alert and oriented x4, sitting up in bed. She reports at baseline being independent with all adls and mobility, residing at home with family. Today she was able to perform all bed mobility, sit <> stand t/fs and adls independently. Sup only for functional mobility. At this time, pt does not display needs for skilled OT services. OT to sign off and anticipate d/c home.  Plan of Care Reviewed With: patient  Outcome Evaluation: OT eval completed. Pt presents alert and oriented x4, sitting up in bed. She reports at baseline being independent with all adls  and mobility, residing at home with family. Today she was able to perform all bed mobility, sit <> stand t/fs and adls independently. Sup only for functional mobility. At this time, pt does not display needs for skilled OT services. OT to sign off and anticipate d/c home.     Time Calculation:         Time Calculation- OT       Row Name 12/10/24 1456             Time Calculation- OT    OT Start Time 1300  -      OT Stop Time 1340  -      OT Time Calculation (min) 40 min  -      OT Received On 12/10/24  -      OT Goal Re-Cert Due Date 12/20/24  -                User Key  (r) = Recorded By, (t) = Taken By, (c) = Cosigned By      Initials Name Provider Type    Noris West OTR/L, TALAT Occupational Therapist                  Therapy Charges for Today       Code Description Service Date Service Provider Modifiers Qty    [id number]  OT EVAL LOW COMPLEXITY 3 12/10/2024 Noris Ca OTR/L, TALAT GO 1               OT Discharge Summary  Anticipated Discharge Disposition (OT): home  Reason for Discharge: At baseline function  Outcomes Achieved: Other (eval x1)  Discharge Destination: Home    JAKE Bowles CSRS  12/10/2024

## 2024-12-10 NOTE — ED NOTES
Pt was seen by her pain management doctor today and had a nerve block, pt also reports she was seen her pcp and had basic lab work done, new medicine was given, pt states 30 mins tonight after taking her new medication she started to feel dizzy and lightheaded

## 2024-12-10 NOTE — ED PROVIDER NOTES
Subjective   History of Present Illness  This is a 59 y.o F with hx of HTN, chronic pain, neuropathy, on multiple medications. PT states she was started on new medication for insomnia, she does not know name of medication and there was concerns today for passing out. PT seen somnolent on evaluation        Review of Systems   All other systems reviewed and are negative.      Past Medical History:   Diagnosis Date    Fibromyalgia     Hyperlipidemia     Hypertension     Hypothyroidism     Low back pain     Migraine     Neuropathy        Allergies   Allergen Reactions    Morphine Hives       Past Surgical History:   Procedure Laterality Date    APPENDECTOMY      HYSTERECTOMY      OOPHORECTOMY         Family History   Problem Relation Age of Onset    Cancer Mother     Diabetes Mother     Cancer Father        Social History     Socioeconomic History    Marital status: Single   Tobacco Use    Smoking status: Every Day     Current packs/day: 1.00     Types: Cigarettes    Smokeless tobacco: Never   Vaping Use    Vaping status: Never Used   Substance and Sexual Activity    Alcohol use: No    Drug use: No    Sexual activity: Defer           Objective   Physical Exam  Constitutional:       General: She is not in acute distress.     Comments: PT is somnolent   HENT:      Head: Normocephalic and atraumatic.      Right Ear: Tympanic membrane normal.      Left Ear: Tympanic membrane normal.      Nose: No congestion.      Mouth/Throat:      Pharynx: No oropharyngeal exudate.   Cardiovascular:      Rate and Rhythm: Normal rate and regular rhythm.      Pulses: Normal pulses.   Pulmonary:      Effort: Pulmonary effort is normal. No respiratory distress.      Breath sounds: Normal breath sounds. No stridor. No wheezing, rhonchi or rales.   Chest:      Chest wall: No tenderness.   Abdominal:      General: Abdomen is flat. Bowel sounds are normal.      Palpations: Abdomen is soft.   Musculoskeletal:         General: Normal range of  motion.      Cervical back: Normal range of motion.   Skin:     General: Skin is warm.      Capillary Refill: Capillary refill takes less than 2 seconds.   Neurological:      General: No focal deficit present.      Mental Status: She is oriented to person, place, and time. Mental status is at baseline.      Cranial Nerves: No cranial nerve deficit.      Comments: PT appers somnolent         Procedures           ED Course  ED Course as of 12/10/24 0636   Tue Dec 10, 2024   0321 pH, Arterial(!): 7.280 [SG]      ED Course User Index  [SG] Dawson Stafford MD                                                       Medical Decision Making  This is a 59 y.o F with hx of chronic pain, insomnia, as well as HTN on clonazepam, as well as oxycodone, was started on new medication for sleep aid, positive for concerns of Low Bp and syncope after medication. PT does not have focal deficits on exam. She appears somnolent. Will obtain CT brain, pending blood work. EKG negative for acute ischemic or interval changes. Impression medications side effect. PT is in O2 IV monitor. No indication at this time for narca. IV fluids given.     Positive for hypoxia without oxygen will obtain ABG. NO acute indications for narcan      Will add xr chest. NO active suspicion for PE. No tachycardia or significant difficulty breathing. PT ambulatory in the ED with no focal deficits.         PT is hypoxic without NC. This is due to hypoventilation. HER ct brain is negative. Negative ddimer for concerns for PE by gaye. Narcan 0.1mg for waking up is indicated. ABG findings of hypoxia, and acidosis is likely due to hypecapnia due to hypoventilation.     PT is easily arousal, however when left alone on monitor positive for clear hypoventilation that leads to hypoxia. Will give additional narcan if indicated    Additional narcan ordered for hypoventilation causing hypoxia. I continue to observe patient.     PT is responsive to narcan 0.4, HR increases  as well as RR and O2 sat. Will continue to monitor. Admit vs discharge.     PT is fully awake. A and O x 3 coherent speech. No deficits. O2 sat now 94-95 on RA. No clinical indication for repeating ABG. Negative for anion gap. Acidosis likely due to hypercapnia as a results of hypoventilation. No SI or HI. We discussed the safety concerns of oxycodone, together with benzodiazepines. I will encouraged narcan prescription. Will check with daughter for safety at home.     3:39 pt continues to have intermittent hypoxia. Discharge will be unsafe option will admit for observation.     3:51am spoke to admitting doctor, concerns for respiratory acidosis for floor admit, which I agree, I also dont think she is sick enough for ICU monitoring. Will placed on bipap to washed Co2 and repeat ABG. RT will be notify by RN    Repeat ABG no longer acidotic, after bipap trial. Placed back on oxygen NC. Hypercapnia much improved, however because of oxygen being low will admit to observation. Potassium repleted. PT accepted by admitting doctor for admit      Amount and/or Complexity of Data Reviewed  Labs: ordered. Decision-making details documented in ED Course.  Radiology: ordered.  ECG/medicine tests: ordered.    Risk  Prescription drug management.        Final diagnoses:   Hypercapnia   Hypoxia       ED Disposition  ED Disposition       ED Disposition   Decision to Admit    Condition   --    Comment   Level of Care: Telemetry [5]   Diagnosis: Acute and chronic respiratory failure with hypercapnia [623503]   Admitting Physician: ISRAEL ALBARADO [[id number]]   Attending Physician: ISRAEL ALBARADO [[id number]]   Certification: I Certify That Inpatient Hospital Services Are Medically Necessary For Greater Than 2 Midnights                 No follow-up provider specified.       Medication List      No changes were made to your prescriptions during this visit.            Dawson Stafford MD  12/10/24 1909

## 2024-12-10 NOTE — THERAPY DISCHARGE NOTE
Patient Name: Destiny Morelos  : 1965    MRN: [id number]                              Today's Date: 12/10/2024       Admit Date: 2024    Visit Dx:     ICD-10-CM ICD-9-CM   1. Hypercapnia  R06.89 786.09   2. Hypoxia  R09.02 799.02     Patient Active Problem List   Diagnosis    Chronic pain    Migraine without status migrainosus, not intractable    Seizure    Major depression, recurrent    Hypothyroidism (acquired)    Lactic acidosis    Chronic, continuous use of opioids    Acute and chronic respiratory failure with hypercapnia    Acute on chronic respiratory failure with hypercapnia     Past Medical History:   Diagnosis Date    Fibromyalgia     Hyperlipidemia     Hypertension     Hypothyroidism     Low back pain     Migraine     Neuropathy      Past Surgical History:   Procedure Laterality Date    APPENDECTOMY      HYSTERECTOMY      OOPHORECTOMY        General Information       Row Name 12/10/24 1307          Physical Therapy Time and Intention    Document Type evaluation (P)   Dx: Acute and chronic respiraotry failure with hypercapnia. presenting to the ED after passing out at her pharmacy.  -AT     Mode of Treatment physical therapy (P)   patient with a medical history of hypertension, dyslipidemia, fibromyalgia, hypothyroidism  -AT       Row Name 12/10/24 1303          General Information    Patient Profile Reviewed yes  -CHAPIN (r) AT (t) CHAPIN (c)     Prior Level of Function independent:;all household mobility;community mobility;gait;ADL's;bed mobility;home management;cooking;cleaning;driving;shopping  -CHAPIN (r) AT (t) CHAPIN (c)     Existing Precautions/Restrictions oxygen therapy device and L/min  2L  -CHAPIN (r) AT (t) CHAPIN (c)     Barriers to Rehab none identified  -CHAPIN       Row Name 12/10/24 1306          Living Environment    People in Home child(omari), adult;grandchild(omari)  -CHAPIN (r) AT (t) CHAPIN (c)       Row Name 12/10/24 1307          Cognition    Orientation Status (Cognition) oriented x 4  -CHAPIN (r) AT (t) CHAPIN (c)                User Key  (r) = Recorded By, (t) = Taken By, (c) = Cosigned By      Initials Name Provider Type    Tyrell Wilkins, PT DPT Physical Therapist    AT [facility] [place], PT Student PT Student                   Mobility       Row Name 12/10/24 1304          Bed Mobility    Bed Mobility supine-sit;sit-supine  -CHAPIN (r) AT (t) CHAPIN (c)     Supine-Sit [place] (Bed Mobility) independent  -CHAPIN (r) AT (t) CHAPIN (c)     Sit-Supine [place] (Bed Mobility) independent  -CHAPIN (r) AT (t) CHAPIN (c)     Assistive Device (Bed Mobility) head of bed elevated  -CHAPIN (r) AT (t) CHAPIN (c)       Row Name 12/10/24 1304          Sit-Stand Transfer    Sit-Stand [place] (Transfers) independent  -CHAPIN (r) AT (t) CHAPIN (c)       Row Name 12/10/24 1304          Gait/Stairs (Locomotion)    [place] Level (Gait) independent  -CHAPIN (r) AT (t) CHAPIN (c)     Patient was able to Ambulate yes  -CHAPIN (r) AT (t) CHAPIN (c)     Distance in Feet (Gait) 100  -CHAPIN (r) AT (t) CHAPIN (c)               User Key  (r) = Recorded By, (t) = Taken By, (c) = Cosigned By      Initials Name Provider Type    Tyrell Wilkins, PT DPT Physical Therapist    AT [facility] [place], PT Student PT Student                   Obj/Interventions       Row Name 12/10/24 1304          Range of Motion Comprehensive    General Range of Motion bilateral lower extremity ROM WFL  -CHAPIN (r) AT (t) CHAPIN (c)       Row Name 12/10/24 1304          Strength Comprehensive (MMT)    General Manual Muscle Testing (MMT) Assessment no strength deficits identified  -CHAPIN (r) AT (t) CHAPIN (c)     Comment, General Manual Muscle Testing (MMT) Assessment Adonay LE grossly 4/5  -CHAPIN (r) AT (t) CHAPIN (c)       Row Name 12/10/24 1304          Balance    Balance Assessment sitting static balance;sitting dynamic balance;standing static balance;standing dynamic balance  -CHAPIN (r) AT (t) CHAPIN (c)     Static Sitting Balance independent  -CHAPIN (r) AT (t) CHAPIN (c)     Dynamic Sitting Balance independent  -CHAPIN (r) AT (t) CHAPIN (c)     Position,  Sitting Balance unsupported;sitting edge of bed  -CHAPIN (r) AT (t) CHAPIN (c)     Static Standing Balance independent  -CHAPIN (r) AT (t) CHAPIN (c)     Dynamic Standing Balance independent  -CHAPIN (r) AT (t) CHAPIN (c)     Position/Device Used, Standing Balance unsupported  -CHAPIN (r) AT (t) CHAPIN (c)       Row Name 12/10/24 1308          Sensory Assessment (Somatosensory)    Sensory Assessment (Somatosensory) LE sensation intact  -CHAPIN (r) AT (t) CHAPIN (c)               User Key  (r) = Recorded By, (t) = Taken By, (c) = Cosigned By      Initials Name Provider Type    Tyrell Wilkins, PT DPT Physical Therapist    [facility] Student PT Student                   Goals/Plan    No documentation.                  Clinical Impression       Row Name 12/10/24 1307          Pain    Pretreatment Pain Rating 0/10 - no pain  -CHAPIN (r) AT (t) CHAPIN (c)     Posttreatment Pain Rating 0/10 - no pain  -CHAPIN (r) AT (t) CHAPIN (c)       Row Name 12/10/24 1303          Plan of Care Review    Plan of Care Reviewed With patient  -CHAPIN (r) AT (t) CHAPIN (c)     Progress no change  -CHAPIN (r) AT (t) CHAPIN (c)     Outcome Evaluation PT eval completed. Pt was OxAx4. Pt stated that she was not in any pain. Pt stated prior to being in the hosptial she was indepednet with all ADLs and mobility. Today, pt was able to go supine <>sitting EOB with independence. Pt was able to go sit to stand with independence and then was able to ambualte 100 ft with independence. At this time pt does not require skilled physical therapy. Recommended discharge home. PT to sign off.  -CHAPIN (r) AT (t) CHAPIN (c)       Row Name 12/10/24 1307          Therapy Assessment/Plan (PT)    Patient/Family Therapy Goals Statement (PT) go home  -CHAPIN (r) AT (t) CHAPIN (c)     Criteria for Skilled Interventions Met (PT) no;does not meet criteria for skilled intervention  -CHAPIN     Therapy Frequency (PT) evaluation only  -CHAPIN (r) AT (t) CHAPIN (c)       Row Name 12/10/24 1303          Vital Signs    O2 Delivery Pre Treatment  supplemental O2  2L  -CHAPIN (r) AT (t) CHAPIN (c)     O2 Delivery Intra Treatment supplemental O2  2L  -CHAPIN (r) AT (t) CHAPIN (c)     O2 Delivery Post Treatment supplemental O2  2L  -CHAPIN (r) AT (t) CHAPIN (c)     Pre Patient Position Supine  -CHAPIN (r) AT (t) CHAPIN (c)     Post Patient Position Supine  -CHAPIN (r) AT (t) CHAPIN (c)       Row Name 12/10/24 1308          Positioning and Restraints    Pre-Treatment Position in bed  -CHAPIN (r) AT (t) CHAPIN (c)     Post Treatment Position bed  -CHAPIN (r) AT (t) CHAPIN (c)     In Bed notified nsg;fowlers;call light within reach;encouraged to call for assist;side rails up x2  -CHAPIN (r) AT (t) CHAPIN (c)               User Key  (r) = Recorded By, (t) = Taken By, (c) = Cosigned By      Initials Name Provider Type    Tyrell Wilkins, PT DPT Physical Therapist    AT [facility], PT Student PT Student                   Outcome Measures       Row Name 12/10/24 1428 12/10/24 1304       How much help from another person do you currently need...    Turning from your back to your side while in flat bed without using bedrails? 4  -LW 4  -CHAPIN (r) AT (t) CHAPIN (c)    Moving from lying on back to sitting on the side of a flat bed without bedrails? 4  -LW 4  -CHAPIN (r) AT (t) CHAPIN (c)    Moving to and from a bed to a chair (including a wheelchair)? 4  -LW 4  -CHAPIN (r) AT (t) CHAPIN (c)    Standing up from a chair using your arms (e.g., wheelchair, bedside chair)? 4  -LW 4  -CHAPIN (r) AT (t) CHAPIN (c)    Climbing 3-5 steps with a railing? 4  -LW 4  -CHAPIN (r) AT (t) CHAPIN (c)    To walk in hospital room? 4  -LW 4  -CHAPIN (r) AT (t) CHAPIN (c)    AM-PAC 6 Clicks Score (PT) 24  -LW 24  -CHAPIN (r) AT (t)    Highest Level of Mobility Goal 8 --> Walked 250 feet or more  -LW 8 --> Walked 250 feet or more  -CHAPIN (r) AT (t)      Row Name 12/10/24 1005          How much help from another person do you currently need...    Turning from your back to your side while in flat bed without using bedrails? 4  -SR     Moving from lying on back to sitting on the side of a flat bed  without bedrails? 4  -SR     Moving to and from a bed to a chair (including a wheelchair)? 4  -SR     Standing up from a chair using your arms (e.g., wheelchair, bedside chair)? 4  -SR     Climbing 3-5 steps with a railing? 4  -SR     To walk in hospital room? 4  -SR     AM-PAC 6 Clicks Score (PT) 24  -SR     Highest Level of Mobility Goal 8 --> Walked 250 feet or more  -SR       Row Name 12/10/24 1304 12/10/24 1300       Functional Assessment    Outcome Measure Options AM-PAC 6 Clicks Basic Mobility (PT)  -CHAPIN (r) AT (t) CHAPIN (c) AM-PAC 6 Clicks Daily Activity (OT)  -              User Key  (r) = Recorded By, (t) = Taken By, (c) = Cosigned By      Initials Name Provider Type    CHAPIN Tyrell Blank, PT DPT Physical Therapist    Noris West, OTR/L, CSRS Occupational Therapist    Justine Moore, RN Registered Nurse     Betsy Workman, RN Registered Nurse    AT Elise Padilla, PT Student PT Student                  Physical Therapy Education       Title: PT OT SLP Therapies (In Progress)       Topic: Physical Therapy (Resolved)       Point: Mobility training (Resolved)       Learning Progress Summary            Patient Acceptance, E, VU by AT at 12/10/2024 1400    Comment: Patient was educated on the benefits of physical activity, POC, and recommended discharge.                      Point: Home exercise program (Resolved)       Learner Progress:  Not documented in this visit.              Point: Body mechanics (Resolved)       Learning Progress Summary            Patient Acceptance, E, VU by AT at 12/10/2024 1400    Comment: Patient was educated on the benefits of physical activity, POC, and recommended discharge.                      Point: Precautions (Resolved)       Learner Progress:  Not documented in this visit.                              User Key       Initials Effective Dates Name Provider Type Discipline    AT 08/22/24 -  Elise Padilla, PT Student PT Student PT                  PT  Recommendation and Plan     Progress: no change  Outcome Evaluation: PT eval completed. Pt was OxAx4. Pt stated that she was not in any pain. Pt stated prior to being in the hosptial she was indepednet with all ADLs and mobility. Today, pt was able to go supine <>sitting EOB with independence. Pt was able to go sit to stand with independence and then was able to ambualte 100 ft with independence. At this time pt does not require skilled physical therapy. Recommended discharge home. PT to sign off.     Time Calculation:         PT Charges       Row Name 12/10/24 1304             Time Calculation    Start Time 1304  15 min CR  -CHAPIN (r) AT (t) CHAPIN (c)      Stop Time 1315  -CHAPIN (r) AT (t) CHAPIN (c)      Time Calculation (min) 11 min  -CHAPIN (r) AT (t)      PT Received On 12/10/24  -CHAPIN (r) AT (t) CHAPIN (c)         Untimed Charges    PT Eval/Re-eval Minutes 26  -CHAPIN (r) AT (t) CHAPIN (c)         Total Minutes    Untimed Charges Total Minutes 26  -CHAPIN (r) AT (t)       Total Minutes 26  -CHAPIN (r) AT (t)                User Key  (r) = Recorded By, (t) = Taken By, (c) = Cosigned By      Initials Name Provider Type    Tyrell Wilkins PT DPT Physical Therapist    AT [facility] Elise, PT Student PT Student                      PT G-Codes  Outcome Measure Options: AM-PAC 6 Clicks Basic Mobility (PT)  AM-PAC 6 Clicks Score (PT): 24  AM-PAC 6 Clicks Score (OT): 24    PT Discharge Summary  Anticipated Discharge Disposition (PT): home    Elise Padilla PT Student  12/10/2024

## 2024-12-10 NOTE — ED NOTES
Daughter presents to the bedside at this time.  informed daughter of pt status and has determined that she will be admitted for observations of low O2 sat. Daughter acknowledged.

## 2024-12-10 NOTE — PLAN OF CARE
Goal Outcome Evaluation:  Plan of Care Reviewed With: patient           Outcome Evaluation: OT eval completed. Pt presents alert and oriented x4, sitting up in bed. She reports at baseline being independent with all adls and mobility, residing at home with family. Today she was able to perform all bed mobility, sit <> stand t/fs and adls independently. Sup only for functional mobility. At this time, pt does not display needs for skilled OT services. OT to sign off and anticipate d/c home.    Anticipated Discharge Disposition (OT): home

## 2024-12-10 NOTE — H&P
[facility] Medicine Services  HISTORY AND PHYSICAL    Date of Admission: 12/9/2024  Primary Care Physician: London Rascon MD    Subjective   Primary Historian: Patient    Chief Complaint: Syncope    HPI:  This is a 59-year-old female patient with a medical history of hypertension, dyslipidemia, fibromyalgia, hypothyroidism, presenting to the ED after passing out at her pharmacy.  Initially, the patient was somnolent, however after receiving ED treatment she improved significantly and was able to answer questions.  She does not remember completely what happened, but she is able to tell that she passed out, she did not hurt her head, and she landed on her knees.  She also remembers feeling short of breath before the episode occurred.  She denies any chest pain, any headache, any fever or chills, abdominal pain, nausea or vomiting.  To note that she takes several home medications including oxycodone and clonazepam.        Review of Systems   Otherwise complete ROS reviewed and negative except as mentioned in the HPI.    Past Medical History:   Past Medical History:   Diagnosis Date    Fibromyalgia     Hyperlipidemia     Hypertension     Hypothyroidism     Low back pain     Migraine     Neuropathy      Past Surgical History:  Past Surgical History:   Procedure Laterality Date    APPENDECTOMY      HYSTERECTOMY      OOPHORECTOMY       Social History:  reports that she has been smoking cigarettes. She has never used smokeless tobacco. She reports that she does not drink alcohol and does not use drugs.    Family History: family history includes Cancer in her father and mother; Diabetes in her mother.       Allergies:  Allergies   Allergen Reactions    Morphine Hives       Medications:  Prior to Admission medications    Medication Sig Start Date End Date Taking? Authorizing Provider   busPIRone (BUSPAR) 10 MG tablet Take 1 tablet by mouth 2 (Two) Times a Day. 11/18/24  Yes Provider,  MD Yesica   butalbital-acetaminophen-caffeine-codeine (FIORICET WITH CODEINE) -52-30 MG per capsule Take 1 capsule by mouth Every 6 (Six) Hours As Needed for Migraine. 11/30/24  Yes Yesica Pimentel MD   carisoprodol (SOMA) 350 MG tablet Take 1 tablet by mouth 3 (Three) Times a Day As Needed for Muscle Spasms. 11/30/24  Yes Yesica Pimentel MD   doxepin (SINEquan) 50 MG capsule Take 1 capsule by mouth Every Night. 8/21/24  Yes Yesica Pimentel MD   prazosin (MINIPRESS) 2 MG capsule Take 1 capsule by mouth Every Night. 12/9/24  Yes Yesica Pimentel MD   amitriptyline (ELAVIL) 50 MG tablet Take 1 tablet by mouth Every Night.    Yesica Pimentel MD   ARIPiprazole (ABILIFY) 10 MG tablet Take 1 tablet by mouth Daily.    Emergency, Nurse Epic, RN   clonazePAM (KlonoPIN) 0.5 MG tablet Take 1 tablet by mouth Every 12 (Twelve) Hours for 30 days. 12/12/23 1/11/24  Ken Melvin MD   gabapentin (NEURONTIN) 800 MG tablet Take 1 tablet by mouth 3 (Three) Times a Day. 12/5/22   Yesica Pimentel MD   levETIRAcetam (KEPPRA) 500 MG tablet Take 1 tablet by mouth 2 (Two) Times a Day for 30 days. 12/12/23 1/11/24  Ken Melvin MD   levothyroxine (SYNTHROID, LEVOTHROID) 88 MCG tablet Take 1 tablet by mouth Daily.    ProviderYesica MD   lisinopril (PRINIVIL,ZESTRIL) 5 MG tablet Take 1 tablet by mouth Daily.    Yesica Pimentel MD   naloxone (NARCAN) 4 MG/0.1ML nasal spray Call 911. Don't prime. [place] in 1 nostril for overdose. Repeat in 2-3 minutes in other nostril if no or minimal breathing/responsiveness. 12/12/23   Ken Melvin MD   oxyCODONE-acetaminophen (PERCOCET)  MG per tablet Take 1 tablet by mouth Every 6 (Six) Hours As Needed. 12/8/22   Yesica Pimentel MD   oxyCODONE-acetaminophen (PERCOCET) 7.5-325 MG per tablet Take 1 tablet by mouth Every 6 (Six) Hours As Needed for Moderate Pain or Severe Pain. 8/14/24   Andres Harris MD   pravastatin  "(PRAVACHOL) 20 MG tablet Take 1 tablet by mouth Daily.    Emergency, Nurse Epic, RN   rOPINIRole (REQUIP) 4 MG tablet Take 1 tablet by mouth Every Night.    Provider, MD Yesica     I have utilized all available immediate resources to obtain, update, or review the patient's current medications (including all prescriptions, over-the-counter products, herbals, cannabis/cannabidiol products, and vitamin/mineral/dietary (nutritional) supplements).    Objective     Vital Signs: /76   Pulse 67   Temp 97.4 °F (36.3 °C) (Oral)   Resp 19   Ht 160 cm (63\")   Wt 56.2 kg (124 lb)   SpO2 92%   BMI 21.97 kg/m²   Physical Exam  Constitutional:       Appearance: She is ill-appearing.   Cardiovascular:      Rate and Rhythm: Normal rate and regular rhythm.      Pulses: Normal pulses.      Heart sounds: Normal heart sounds. No murmur heard.  Pulmonary:      Effort: Pulmonary effort is normal. No respiratory distress.      Breath sounds: Normal breath sounds. No wheezing or rales.   Abdominal:      General: Bowel sounds are normal. There is no distension.      Palpations: Abdomen is soft.      Tenderness: There is no abdominal tenderness. There is no guarding.   Musculoskeletal:      Right lower leg: No edema.      Left lower leg: No edema.   Skin:     General: Skin is warm.   Neurological:      General: No focal deficit present.      Mental Status: She is alert and oriented to person, place, and time.              Results Reviewed:  Lab Results (last 24 hours)       Procedure Component Value Units Date/Time    Blood Gas, Arterial With Co-Ox [[id number]]  (Abnormal) Collected: 12/10/24 0520    Specimen: Arterial Blood Updated: 12/10/24 0520     Site Right Radial     Kevin's Test Positive     pH, Arterial 7.350 pH units      pCO2, Arterial 54.8 mm Hg      Comment: 83 Value above reference range        pO2, Arterial 66.9 mm Hg      Comment: 84 Value below reference range        HCO3, Arterial 30.2 mmol/L      Comment: 83 " "Value above reference range        Base Excess, Arterial 3.4 mmol/L      Comment: 83 Value above reference range        O2 Saturation, Arterial 93.9 %      Comment: 84 Value below reference range        Hemoglobin, Blood Gas 12.9 g/dL      Hematocrit, Blood Gas 39.4 %      Oxyhemoglobin 90.2 %      Comment: 84 Value below reference range        Methemoglobin 0.80 %      Carboxyhemoglobin 3.1 %      Temperature 37.0     Sodium, Arterial 143 mmol/L      Potassium, Arterial 3.8 mmol/L      Barometric Pressure for Blood Gas 749 mmHg      Modality BiPap     FIO2 30 %      Ventilator Mode NA     Set [facility] Resp Rate 16.0     IPAP 12     Comment: Meter: [id number]     :  Melquiades Johnson, CRT        EPAP 6     Collected by [id number]     pH, Temp Corrected 7.350 pH Units      pCO2, Temperature Corrected 54.8 mm Hg      pO2, Temperature Corrected 66.9 mm Hg     D-dimer, Quantitative [[id number]]  (Normal) Collected: 12/09/24 2144    Specimen: Blood Updated: 12/10/24 0043     D-Dimer, Quantitative 0.32 MCGFEU/mL     Narrative:      According to the assay 's published package insert, a normal (<0.50 MCGFEU/mL) D-dimer result in conjunction with a non-high clinical probability assessment, excludes deep vein thrombosis (DVT) and pulmonary embolism (PE) with high sensitivity.    D-dimer values increase with age and this can make VTE exclusion of an older population difficult. To address this, the [employer], based on best available evidence and recent guidelines, recommends that clinicians use age-adjusted D-dimer thresholds in patients greater than 50 years of age with: a) a low probability of PE who do not meet all Pulmonary Embolism Rule Out Criteria, or b) in those with intermediate probability of PE.   The formula for an age-adjusted D-dimer cut-off is \"age/100\".  For example, a 60 year old patient would have an age-adjusted cut-off of 0.60 MCGFEU/mL and an 80 year old 0.80 MCGFEU/mL.    " Blood Gas, Arterial - [[id number]]  (Abnormal) Collected: 12/10/24 0015    Specimen: Arterial Blood Updated: 12/10/24 0014     Site Left Radial     Kevin's Test Positive     pH, Arterial 7.280 pH units      Comment: 84 Value below reference range        pCO2, Arterial 64.6 mm Hg      Comment: 83 Value above reference range        pO2, Arterial 69.1 mm Hg      Comment: 84 Value below reference range        HCO3, Arterial 30.3 mmol/L      Comment: 83 Value above reference range        Base Excess, Arterial 2.0 mmol/L      Comment: 83 Value above reference range        O2 Saturation, Arterial 92.6 %      Comment: 84 Value below reference range        Temperature 37.0     Barometric Pressure for Blood Gas 750 mmHg      Modality Nasal Cannula     Flow Rate 2.0 lpm      Ventilator Mode NA     Collected by [id number]     Comment: Meter: [id number]     :  Melquiades Johnson, JAMES        pCO2, Temperature Corrected 64.6 mm Hg      pH, Temp Corrected 7.280 pH Units      pO2, Temperature Corrected 69.1 mm Hg     Comprehensive Metabolic Panel [[id number]]  (Abnormal) Collected: 12/09/24 2144    Specimen: Blood Updated: 12/09/24 2320     Glucose 156 mg/dL      BUN 5 mg/dL      Creatinine 0.49 mg/dL      Sodium 142 mmol/L      Potassium 3.1 mmol/L      Comment: Slight hemolysis detected by analyzer. Result may be falsely elevated.        Chloride 105 mmol/L      CO2 28.0 mmol/L      Calcium 7.9 mg/dL      Total Protein 5.7 g/dL      Albumin 3.6 g/dL      ALT (SGPT) 11 U/L      AST (SGOT) 16 U/L      Alkaline Phosphatase 86 U/L      Total Bilirubin <0.2 mg/dL      Globulin 2.1 gm/dL      A/G Ratio 1.7 g/dL      BUN/Creatinine Ratio 10.2     Anion Gap 9.0 mmol/L      eGFR 109.4 mL/min/1.73     Narrative:      GFR Normal >60  Chronic Kidney Disease <60  Kidney Failure <15      Single High Sensitivity Troponin T [[id number]]  (Normal) Collected: 12/09/24 2144    Specimen: Blood Updated: 12/09/24 2318     HS Troponin T 7 ng/L      Narrative:      High Sensitive Troponin T Reference Range:  <14.0 ng/L- Negative Female for AMI  <22.0 ng/L- Negative Male for AMI  >=14 - Abnormal Female indicating possible myocardial injury.  >=22 - Abnormal Male indicating possible myocardial injury.   Clinicians would have to utilize clinical acumen, EKG, Troponin, and serial changes to determine if it is an Acute Myocardial Infarction or myocardial injury due to an underlying chronic condition.         CBC & Differential [[id number]]  (Abnormal) Collected: 12/09/24 2144    Specimen: Blood Updated: 12/09/24 2305    Narrative:      The following orders were created for panel order CBC & Differential.  Procedure                               Abnormality         Status                     ---------                               -----------         ------                     CBC Auto Differential[[id number]]        Abnormal            Final result                 Please view results for these tests on the individual orders.    CBC Auto Differential [[id number]]  (Abnormal) Collected: 12/09/24 2144    Specimen: Blood Updated: 12/09/24 2305     WBC 8.75 10*3/mm3      RBC 3.64 10*6/mm3      Hemoglobin 12.1 g/dL      Hematocrit 36.9 %      .4 fL      MCH 33.2 pg      MCHC 32.8 g/dL      RDW 12.6 %      RDW-SD 47.1 fl      MPV 9.9 fL      Platelets 218 10*3/mm3      Neutrophil % 62.3 %      Lymphocyte % 32.1 %      Monocyte % 4.6 %      Eosinophil % 0.5 %      Basophil % 0.3 %      Immature Grans % 0.2 %      Neutrophils, Absolute 5.45 10*3/mm3      Lymphocytes, Absolute 2.81 10*3/mm3      Monocytes, Absolute 0.40 10*3/mm3      Eosinophils, Absolute 0.04 10*3/mm3      Basophils, Absolute 0.03 10*3/mm3      Immature Grans, Absolute 0.02 10*3/mm3      nRBC 0.0 /100 WBC     [place] Draw [[id number]] Collected: 12/09/24 2144    Specimen: Blood Updated: 12/09/24 2201    Narrative:      The following orders were created for panel order [place] Draw.  Procedure                                Abnormality         Status                     ---------                               -----------         ------                     Green Top (Gel)[[id number]]                                  Final result               Lavender Top[[id number]]                                     Final result               Red Top[[id number]]                                          Final result               Gray Top[[id number]]                                         Final result               Light Blue Top[[id number]]                                   Final result                 Please view results for these tests on the individual orders.    Green Top (Gel) [[id number]] Collected: 12/09/24 2144    Specimen: Blood Updated: 12/09/24 2201     Extra Tube Hold for add-ons.     Comment: Auto resulted.       Red Top [[id number]] Collected: 12/09/24 2144    Specimen: Blood Updated: 12/09/24 2201     Extra Tube Hold for add-ons.     Comment: Auto resulted.       [place] Top [[id number]] Collected: 12/09/24 2144    Specimen: Blood Updated: 12/09/24 2201     Extra Tube Hold for add-ons.     Comment: Auto resulted.       Lavender Top [[id number]] Collected: 12/09/24 2144    Specimen: Blood Updated: 12/09/24 2201     Extra Tube hold for add-on     Comment: Auto resulted       Light Blue Top [[id number]] Collected: 12/09/24 2144    Specimen: Blood Updated: 12/09/24 2201     Extra Tube Hold for add-ons.     Comment: Auto resulted             Imaging Results (Last 24 Hours)       Procedure Component Value Units Date/Time    XR Chest 1 View [[id number]] Resulted: 12/09/24 2345     Updated: 12/09/24 2346    CT Head Without Contrast [[id number]] Resulted: 12/09/24 2319     Updated: 12/09/24 2327          I have personally reviewed and interpreted the radiology studies and ECG obtained at time of admission.     Assessment / Plan   Assessment:   Active Hospital Problems    Diagnosis     **Acute and chronic respiratory failure with hypercapnia         Assessment and plan:    #Syncope.  #Home meds including oxycodone and clonazepam.  #Acute hypoxemic and hypercapnic respiratory failure.  #Hypokalemia    -Admitting to telemetry.  -Patient blood gases improved after receiving BiPAP.  Will continue with BiPAP as needed.  -Patient received 3 doses of Narcan, will continue as needed.  -Chest x-ray was done, awaiting final report.  -CT brain was done preliminary report did not show any acute findings.  -PT and OT ordered.  -I placed an order for urine drug screen.  -DVT prophylaxis with heparin subcu.  -Replating potassium by ED.  I ordered repeat BMP during the day and I added Magnesium level.  -Will add nebulizer as needed and standing.    Med rec is not complete.  Day team to follow.  I believe she would benefit from a assessment from pharmacy regarding her polypharmacy.    I discussed CODE STATUS with the patient and she wants to be full code.    Electronically signed by Will Ram MD, 12/10/24, 05:57 CST.

## 2024-12-10 NOTE — ED NOTES
Spoke with pt's daughter in regards for ride home, she reports she is at home with the kids and have her mother call her back in 10 mins

## 2024-12-10 NOTE — PLAN OF CARE
Goal Outcome Evaluation:  Plan of Care Reviewed With: patient        Progress: no change  Outcome Evaluation: PT eval completed. Pt was OxAx4. Pt stated that she was not in any pain. Pt stated prior to being in the hosptial she was indepednet with all ADLs and mobility. Today, pt was able to go supine <>sitting EOB with independence. Pt was able to go sit to stand with independence and then was able to ambualte 100 ft with independence. At this time pt does not require skilled physical therapy. Recommended discharge home. PT to sign off.    Anticipated Discharge Disposition (PT): home

## 2024-12-11 ENCOUNTER — APPOINTMENT (OUTPATIENT)
Dept: CT IMAGING | Facility: HOSPITAL | Age: 59
End: 2024-12-11
Payer: MEDICARE

## 2024-12-11 ENCOUNTER — APPOINTMENT (OUTPATIENT)
Dept: CARDIOLOGY | Facility: HOSPITAL | Age: 59
End: 2024-12-11
Payer: MEDICARE

## 2024-12-11 VITALS
TEMPERATURE: 98.1 F | HEIGHT: 63 IN | SYSTOLIC BLOOD PRESSURE: 172 MMHG | OXYGEN SATURATION: 100 % | RESPIRATION RATE: 18 BRPM | WEIGHT: 124 LBS | BODY MASS INDEX: 21.97 KG/M2 | HEART RATE: 95 BPM | DIASTOLIC BLOOD PRESSURE: 75 MMHG

## 2024-12-11 LAB
ARTERIAL PATENCY WRIST A: ABNORMAL
ATMOSPHERIC PRESS: 754 MMHG
BASE EXCESS BLDA CALC-SCNC: 4.7 MMOL/L (ref 0–2)
BDY SITE: ABNORMAL
BH CV ECHO MEAS - AO MAX PG: 4.7 MMHG
BH CV ECHO MEAS - AO MEAN PG: 2.36 MMHG
BH CV ECHO MEAS - AO ROOT DIAM: 3.2 CM
BH CV ECHO MEAS - AO V2 MAX: 108.2 CM/SEC
BH CV ECHO MEAS - AO V2 VTI: 25.4 CM
BH CV ECHO MEAS - AVA(I,D): 3.1 CM2
BH CV ECHO MEAS - EDV(CUBED): 76 ML
BH CV ECHO MEAS - EDV(MOD-SP2): 61.7 ML
BH CV ECHO MEAS - EDV(MOD-SP4): 56.1 ML
BH CV ECHO MEAS - EF(MOD-SP2): 71.1 %
BH CV ECHO MEAS - EF(MOD-SP4): 58.8 %
BH CV ECHO MEAS - ESV(CUBED): 29.9 ML
BH CV ECHO MEAS - ESV(MOD-SP2): 17.8 ML
BH CV ECHO MEAS - ESV(MOD-SP4): 23.1 ML
BH CV ECHO MEAS - FS: 26.8 %
BH CV ECHO MEAS - IVS/LVPW: 1.01 CM
BH CV ECHO MEAS - IVSD: 0.88 CM
BH CV ECHO MEAS - LV DIASTOLIC VOL/BSA (35-75): 37.2 CM2
BH CV ECHO MEAS - LV MASS(C)D: 116 GRAMS
BH CV ECHO MEAS - LV MAX PG: 3.8 MMHG
BH CV ECHO MEAS - LV MEAN PG: 1.87 MMHG
BH CV ECHO MEAS - LV SYSTOLIC VOL/BSA (12-30): 15.3 CM2
BH CV ECHO MEAS - LV V1 MAX: 97.5 CM/SEC
BH CV ECHO MEAS - LV V1 VTI: 22.3 CM
BH CV ECHO MEAS - LVIDD: 4.2 CM
BH CV ECHO MEAS - LVIDS: 3.1 CM
BH CV ECHO MEAS - LVOT AREA: 3.6 CM2
BH CV ECHO MEAS - LVOT DIAM: 2.14 CM
BH CV ECHO MEAS - LVPWD: 0.87 CM
BH CV ECHO MEAS - MV A MAX VEL: 81.7 CM/SEC
BH CV ECHO MEAS - MV DEC SLOPE: 326.7 CM/SEC2
BH CV ECHO MEAS - MV DEC TIME: 0.25 SEC
BH CV ECHO MEAS - MV E MAX VEL: 77.7 CM/SEC
BH CV ECHO MEAS - MV E/A: 0.95
BH CV ECHO MEAS - MV MAX PG: 2.38 MMHG
BH CV ECHO MEAS - MV MEAN PG: 1.37 MMHG
BH CV ECHO MEAS - MV V2 VTI: 26 CM
BH CV ECHO MEAS - MVA(VTI): 3.1 CM2
BH CV ECHO MEAS - PA V2 MAX: 83.4 CM/SEC
BH CV ECHO MEAS - RAP SYSTOLE: 8 MMHG
BH CV ECHO MEAS - RVSP: 10 MMHG
BH CV ECHO MEAS - SV(LVOT): 79.8 ML
BH CV ECHO MEAS - SV(MOD-SP2): 43.9 ML
BH CV ECHO MEAS - SV(MOD-SP4): 33 ML
BH CV ECHO MEAS - SVI(LVOT): 53 ML/M2
BH CV ECHO MEAS - SVI(MOD-SP2): 29.2 ML/M2
BH CV ECHO MEAS - SVI(MOD-SP4): 21.9 ML/M2
BH CV ECHO MEAS - TR MAX PG: 2 MMHG
BH CV ECHO MEAS - TR MAX VEL: 68.8 CM/SEC
BODY TEMPERATURE: 37
HCO3 BLDA-SCNC: 30.5 MMOL/L (ref 20–26)
Lab: ABNORMAL
MODALITY: ABNORMAL
PCO2 BLDA: 49.3 MM HG (ref 35–45)
PCO2 TEMP ADJ BLD: 49.3 MM HG (ref 35–45)
PH BLDA: 7.4 PH UNITS (ref 7.35–7.45)
PH, TEMP CORRECTED: 7.4 PH UNITS (ref 7.35–7.45)
PO2 BLDA: 76.3 MM HG (ref 83–108)
PO2 TEMP ADJ BLD: 76.3 MM HG (ref 83–108)
SAO2 % BLDCOA: 95.7 % (ref 94–99)
VENTILATOR MODE: ABNORMAL

## 2024-12-11 PROCEDURE — 94761 N-INVAS EAR/PLS OXIMETRY MLT: CPT

## 2024-12-11 PROCEDURE — 36600 WITHDRAWAL OF ARTERIAL BLOOD: CPT

## 2024-12-11 PROCEDURE — G0378 HOSPITAL OBSERVATION PER HR: HCPCS

## 2024-12-11 PROCEDURE — 94664 DEMO&/EVAL PT USE INHALER: CPT

## 2024-12-11 PROCEDURE — 96372 THER/PROPH/DIAG INJ SC/IM: CPT

## 2024-12-11 PROCEDURE — 82803 BLOOD GASES ANY COMBINATION: CPT

## 2024-12-11 PROCEDURE — 94799 UNLISTED PULMONARY SVC/PX: CPT

## 2024-12-11 PROCEDURE — 25510000001 IOPAMIDOL PER 1 ML: Performed by: FAMILY MEDICINE

## 2024-12-11 PROCEDURE — 93306 TTE W/DOPPLER COMPLETE: CPT

## 2024-12-11 PROCEDURE — 71275 CT ANGIOGRAPHY CHEST: CPT

## 2024-12-11 PROCEDURE — 25010000002 HEPARIN (PORCINE) PER 1000 UNITS

## 2024-12-11 PROCEDURE — 93306 TTE W/DOPPLER COMPLETE: CPT | Performed by: INTERNAL MEDICINE

## 2024-12-11 RX ORDER — LEVOTHYROXINE SODIUM 88 UG/1
88 TABLET ORAL
Qty: 30 TABLET | Refills: 0 | Status: SHIPPED | OUTPATIENT
Start: 2024-12-12 | End: 2025-01-11

## 2024-12-11 RX ORDER — IOPAMIDOL 755 MG/ML
100 INJECTION, SOLUTION INTRAVASCULAR
Status: COMPLETED | OUTPATIENT
Start: 2024-12-11 | End: 2024-12-11

## 2024-12-11 RX ADMIN — IPRATROPIUM BROMIDE AND ALBUTEROL SULFATE 3 ML: .5; 3 SOLUTION RESPIRATORY (INHALATION) at 10:49

## 2024-12-11 RX ADMIN — IPRATROPIUM BROMIDE AND ALBUTEROL SULFATE 3 ML: .5; 3 SOLUTION RESPIRATORY (INHALATION) at 06:32

## 2024-12-11 RX ADMIN — Medication 10 ML: at 09:08

## 2024-12-11 RX ADMIN — IOPAMIDOL 62 ML: 755 INJECTION, SOLUTION INTRAVENOUS at 08:22

## 2024-12-11 RX ADMIN — GABAPENTIN 800 MG: 100 CAPSULE ORAL at 05:14

## 2024-12-11 RX ADMIN — BUSPIRONE HYDROCHLORIDE 10 MG: 10 TABLET ORAL at 09:08

## 2024-12-11 RX ADMIN — LISINOPRIL 5 MG: 10 TABLET ORAL at 09:07

## 2024-12-11 RX ADMIN — HEPARIN SODIUM 5000 UNITS: 5000 INJECTION, SOLUTION INTRAVENOUS; SUBCUTANEOUS at 09:08

## 2024-12-11 NOTE — PROGRESS NOTES
Daily Progress Note  Destiny Morelos  MRN: [id number] LOS: 1    Admit Date: 12/9/2024 12/11/2024 07:12 CST    Subjective:      Chief Complaint:  Chief Complaint   Patient presents with    Syncope    Cough       Interval History:    Reviewed overnight events and nursing notes.   No further syncopal spells since admit. Initially admitted to hospitalist service inadvertently. Note she was hypoxic and hypercarbic. Polypharmacy with multiple meds that can cause neurosedation and respiratory suppression. Just started on Prazosin at night for sleep after requesting this after taking a relatives supply and it helping sleep.      Review of Systems   Constitutional: Negative.    HENT: Negative.     Respiratory: Negative.     Cardiovascular: Negative.    Gastrointestinal: Negative.    Genitourinary: Negative.    Musculoskeletal: Negative.    Neurological:  Positive for syncope.       DIET:  Diet: Regular/House; Fluid Consistency: Thin (IDDSI 0)    Medications:      amitriptyline, 50 mg, Oral, Nightly  ARIPiprazole, 10 mg, Oral, Q24H  busPIRone, 10 mg, Oral, BID  gabapentin, 800 mg, Oral, Q8H  heparin (porcine), 5,000 Units, Subcutaneous, Q12H  ipratropium-albuterol, 3 mL, Nebulization, 4x Daily - RT  levothyroxine, 88 mcg, Oral, Q AM  lisinopril, 5 mg, Oral, Daily  nicotine, 1 patch, Transdermal, Q24H  pravastatin, 20 mg, Oral, Q24H  rOPINIRole, 4 mg, Oral, Nightly  sodium chloride, 10 mL, Intravenous, Q12H  terazosin, 2 mg, Oral, Nightly        Data:     Code Status:   Code Status and Medical Interventions: CPR (Attempt to Resuscitate); Full Support   Ordered at: 12/10/24 0556     Level Of Support Discussed With:    Patient     Code Status (Patient has no pulse and is not breathing):    CPR (Attempt to Resuscitate)     Medical Interventions (Patient has pulse or is breathing):    Full Support       Family History   Problem Relation Age of Onset    Cancer Mother     Diabetes Mother     Cancer Father      Social History      Socioeconomic History    Marital status: Single   Tobacco Use    Smoking status: Every Day     Current packs/day: 1.00     Types: Cigarettes    Smokeless tobacco: Never   Vaping Use    Vaping status: Never Used   Substance and Sexual Activity    Alcohol use: No    Drug use: No    Sexual activity: Defer       Labs:    Lab Results (last 72 hours)       Procedure Component Value Units Date/Time    Blood Gas, Arterial - [[id number]]  (Abnormal) Collected: 12/11/24 0659    Specimen: Arterial Blood Updated: 12/11/24 0656     Site Right Brachial     Kevin's Test N/A     pH, Arterial 7.401 pH units      pCO2, Arterial 49.3 mm Hg      Comment: 83 Value above reference range        pO2, Arterial 76.3 mm Hg      Comment: 84 Value below reference range        HCO3, Arterial 30.5 mmol/L      Comment: 83 Value above reference range        Base Excess, Arterial 4.7 mmol/L      Comment: 83 Value above reference range        O2 Saturation, Arterial 95.7 %      Temperature 37.0     Barometric Pressure for Blood Gas 754 mmHg      Modality Room Air     Ventilator Mode NA     Collected by [id number]     Comment: Meter: [id number]     :  Kristyn Oliveira, RRT        pCO2, Temperature Corrected 49.3 mm Hg      pH, Temp Corrected 7.401 pH Units      pO2, Temperature Corrected 76.3 mm Hg     Magnesium [[id number]]  (Normal) Collected: 12/10/24 1547    Specimen: Blood Updated: 12/10/24 1705     Magnesium 2.0 mg/dL     Basic Metabolic Panel [[id number]]  (Abnormal) Collected: 12/10/24 1547    Specimen: Blood Updated: 12/10/24 1705     Glucose 123 mg/dL      BUN 7 mg/dL      Creatinine 0.43 mg/dL      Sodium 140 mmol/L      Potassium 3.4 mmol/L      Chloride 101 mmol/L      CO2 30.0 mmol/L      Calcium 8.7 mg/dL      BUN/Creatinine Ratio 16.3     Anion Gap 9.0 mmol/L      eGFR 112.9 mL/min/1.73     Narrative:      GFR Categories in Chronic Kidney Disease (CKD)      GFR Category          GFR (mL/min/1.73)    Interpretation  G1                      90 or greater         Normal or high (1)  G2                      60-89                Mild decrease (1)  G3a                   45-59                Mild to moderate decrease  G3b                   30-44                Moderate to severe decrease  G4                    15-29                Severe decrease  G5                    14 or less           Kidney failure          (1)In the absence of evidence of kidney disease, neither GFR category G1 or G2 fulfill the criteria for CKD.    eGFR calculation 2021 CKD-EPI creatinine equation, which does not include race as a factor    CBC (No Diff) [[id number]]  (Abnormal) Collected: 12/10/24 1547    Specimen: Blood Updated: 12/10/24 1648     WBC 7.61 10*3/mm3      RBC 4.11 10*6/mm3      Hemoglobin 13.2 g/dL      Hematocrit 41.5 %      .0 fL      MCH 32.1 pg      MCHC 31.8 g/dL      RDW 12.6 %      RDW-SD 47.1 fl      MPV 10.2 fL      Platelets 252 10*3/mm3     Fentanyl, Urine - Urine, Clean Catch [[id number]]  (Normal) Collected: 12/10/24 0730    Specimen: Urine, Clean Catch Updated: 12/10/24 0754     Fentanyl, Urine Negative    Narrative:      Negative Threshold:      Fentanyl 5 ng/mL     The normal value for the drug tested is negative. This report includes final unconfirmed screening results to be used for medical treatment purposes only. Unconfirmed results must not be used for non-medical purposes such as employment or legal testing. Clinical consideration should be applied to any drug of abuse test, particularly when unconfirmed results are used.           Urine Drug Screen - Urine, Clean Catch [[id number]]  (Abnormal) Collected: 12/10/24 0730    Specimen: Urine, Clean Catch Updated: 12/10/24 0748     THC, Screen, Urine Negative     Phencyclidine (PCP), Urine Negative     Cocaine Screen, Urine Negative     Methamphetamine, Ur Negative     Opiate Screen Positive     Amphetamine Screen, Urine Negative     Benzodiazepine Screen, Urine Negative      Tricyclic Antidepressants Screen Positive     Methadone Screen, Urine Negative     Barbiturates Screen, Urine Positive     Oxycodone Screen, Urine Negative     Buprenorphine, Screen, Urine Negative    Narrative:      Cutoff For Drugs Screened:    Amphetamines               500 ng/ml  Barbiturates               200 ng/ml  Benzodiazepines            150 ng/ml  Cocaine                    150 ng/ml  Methadone                  200 ng/ml  Opiates                    100 ng/ml  Phencyclidine               25 ng/ml  THC                         50 ng/ml  Methamphetamine            500 ng/ml  Tricyclic Antidepressants  300 ng/ml  Oxycodone                  100 ng/ml  Buprenorphine               10 ng/ml    The normal value for all drugs tested is negative. This report includes unconfirmed screening results, with the cutoff values listed, to be used for medical treatment purposes only.  Unconfirmed results must not be used for non-medical purposes such as employment or legal testing.  Clinical consideration should be applied to any drug of abuse test, particularly when unconfirmed results are used.      Blood Gas, Arterial With Co-Ox [[id number]]  (Abnormal) Collected: 12/10/24 0520    Specimen: Arterial Blood Updated: 12/10/24 0520     Site Right Radial     Kevin's Test Positive     pH, Arterial 7.350 pH units      pCO2, Arterial 54.8 mm Hg      Comment: 83 Value above reference range        pO2, Arterial 66.9 mm Hg      Comment: 84 Value below reference range        HCO3, Arterial 30.2 mmol/L      Comment: 83 Value above reference range        Base Excess, Arterial 3.4 mmol/L      Comment: 83 Value above reference range        O2 Saturation, Arterial 93.9 %      Comment: 84 Value below reference range        Hemoglobin, Blood Gas 12.9 g/dL      Hematocrit, Blood Gas 39.4 %      Oxyhemoglobin 90.2 %      Comment: 84 Value below reference range        Methemoglobin 0.80 %      Carboxyhemoglobin 3.1 %      Temperature 37.0      "Sodium, Arterial 143 mmol/L      Potassium, Arterial 3.8 mmol/L      Barometric Pressure for Blood Gas 749 mmHg      Modality BiPap     FIO2 30 %      Ventilator Mode NA     Set [facility] Resp Rate 16.0     IPAP 12     Comment: Meter: [id number]     :  Melquiades Johnson, CRT        EPAP 6     Collected by [id number]     pH, Temp Corrected 7.350 pH Units      pCO2, Temperature Corrected 54.8 mm Hg      pO2, Temperature Corrected 66.9 mm Hg     D-dimer, Quantitative [[id number]]  (Normal) Collected: 12/09/24 2144    Specimen: Blood Updated: 12/10/24 0043     D-Dimer, Quantitative 0.32 MCGFEU/mL     Narrative:      According to the assay 's published package insert, a normal (<0.50 MCGFEU/mL) D-dimer result in conjunction with a non-high clinical probability assessment, excludes deep vein thrombosis (DVT) and pulmonary embolism (PE) with high sensitivity.    D-dimer values increase with age and this can make VTE exclusion of an older population difficult. To address this, the [employer], based on best available evidence and recent guidelines, recommends that clinicians use age-adjusted D-dimer thresholds in patients greater than 50 years of age with: a) a low probability of PE who do not meet all Pulmonary Embolism Rule Out Criteria, or b) in those with intermediate probability of PE.   The formula for an age-adjusted D-dimer cut-off is \"age/100\".  For example, a 60 year old patient would have an age-adjusted cut-off of 0.60 MCGFEU/mL and an 80 year old 0.80 MCGFEU/mL.    Blood Gas, Arterial - [[id number]]  (Abnormal) Collected: 12/10/24 0015    Specimen: Arterial Blood Updated: 12/10/24 0014     Site Left Radial     Kevin's Test Positive     pH, Arterial 7.280 pH units      Comment: 84 Value below reference range        pCO2, Arterial 64.6 mm Hg      Comment: 83 Value above reference range        pO2, Arterial 69.1 mm Hg      Comment: 84 Value below reference range        HCO3, Arterial " 30.3 mmol/L      Comment: 83 Value above reference range        Base Excess, Arterial 2.0 mmol/L      Comment: 83 Value above reference range        O2 Saturation, Arterial 92.6 %      Comment: 84 Value below reference range        Temperature 37.0     Barometric Pressure for Blood Gas 750 mmHg      Modality Nasal Cannula     Flow Rate 2.0 lpm      Ventilator Mode NA     Collected by [id number]     Comment: Meter: [id number]     :  Melquiades Johnson, JAMES        pCO2, Temperature Corrected 64.6 mm Hg      pH, Temp Corrected 7.280 pH Units      pO2, Temperature Corrected 69.1 mm Hg     Comprehensive Metabolic Panel [[id number]]  (Abnormal) Collected: 12/09/24 2144    Specimen: Blood Updated: 12/09/24 2320     Glucose 156 mg/dL      BUN 5 mg/dL      Creatinine 0.49 mg/dL      Sodium 142 mmol/L      Potassium 3.1 mmol/L      Comment: Slight hemolysis detected by analyzer. Result may be falsely elevated.        Chloride 105 mmol/L      CO2 28.0 mmol/L      Calcium 7.9 mg/dL      Total Protein 5.7 g/dL      Albumin 3.6 g/dL      ALT (SGPT) 11 U/L      AST (SGOT) 16 U/L      Alkaline Phosphatase 86 U/L      Total Bilirubin <0.2 mg/dL      Globulin 2.1 gm/dL      A/G Ratio 1.7 g/dL      BUN/Creatinine Ratio 10.2     Anion Gap 9.0 mmol/L      eGFR 109.4 mL/min/1.73     Narrative:      GFR Normal >60  Chronic Kidney Disease <60  Kidney Failure <15      Single High Sensitivity Troponin T [[id number]]  (Normal) Collected: 12/09/24 2144    Specimen: Blood Updated: 12/09/24 2318     HS Troponin T 7 ng/L     Narrative:      High Sensitive Troponin T Reference Range:  <14.0 ng/L- Negative Female for AMI  <22.0 ng/L- Negative Male for AMI  >=14 - Abnormal Female indicating possible myocardial injury.  >=22 - Abnormal Male indicating possible myocardial injury.   Clinicians would have to utilize clinical acumen, EKG, Troponin, and serial changes to determine if it is an Acute Myocardial Infarction or myocardial injury due to an  underlying chronic condition.         CBC & Differential [[id number]]  (Abnormal) Collected: 12/09/24 2144    Specimen: Blood Updated: 12/09/24 2305    Narrative:      The following orders were created for panel order CBC & Differential.  Procedure                               Abnormality         Status                     ---------                               -----------         ------                     CBC Auto Differential[[id number]]        Abnormal            Final result                 Please view results for these tests on the individual orders.    CBC Auto Differential [[id number]]  (Abnormal) Collected: 12/09/24 2144    Specimen: Blood Updated: 12/09/24 2305     WBC 8.75 10*3/mm3      RBC 3.64 10*6/mm3      Hemoglobin 12.1 g/dL      Hematocrit 36.9 %      .4 fL      MCH 33.2 pg      MCHC 32.8 g/dL      RDW 12.6 %      RDW-SD 47.1 fl      MPV 9.9 fL      Platelets 218 10*3/mm3      Neutrophil % 62.3 %      Lymphocyte % 32.1 %      Monocyte % 4.6 %      Eosinophil % 0.5 %      Basophil % 0.3 %      Immature Grans % 0.2 %      Neutrophils, Absolute 5.45 10*3/mm3      Lymphocytes, Absolute 2.81 10*3/mm3      Monocytes, Absolute 0.40 10*3/mm3      Eosinophils, Absolute 0.04 10*3/mm3      Basophils, Absolute 0.03 10*3/mm3      Immature Grans, Absolute 0.02 10*3/mm3      nRBC 0.0 /100 WBC     [facility] Draw [[id number]] Collected: 12/09/24 2144    Specimen: Blood Updated: 12/09/24 2201    Narrative:      The following orders were created for panel order [facility] Draw.  Procedure                               Abnormality         Status                     ---------                               -----------         ------                     Green Top (Gel)[[id number]]                                  Final result               Lavender Top[[id number]]                                     Final result               Red Top[[id number]]                                          Final result               Atkinson  "Top[[id number]]                                         Final result               Light Blue Top[[id number]]                                   Final result                 Please view results for these tests on the individual orders.    Green Top (Gel) [[id number]] Collected: 24    Specimen: Blood Updated: 24     Extra Tube Hold for add-ons.     Comment: Auto resulted.       Red Top [[id number]] Collected: 24    Specimen: Blood Updated: 24     Extra Tube Hold for add-ons.     Comment: Auto resulted.       Atkinson Top [[id number]] Collected: 24    Specimen: Blood Updated: 24     Extra Tube Hold for add-ons.     Comment: Auto resulted.       Lavender Top [[id number]] Collected: 24    Specimen: Blood Updated: 24     Extra Tube hold for add-on     Comment: Auto resulted       Light Blue Top [[id number]] Collected: 24    Specimen: Blood Updated: 24     Extra Tube Hold for add-ons.     Comment: Auto resulted                 Objective:     Vitals: /72 (BP Location: Left arm, Patient Position: Sitting)   Pulse 71   Temp 97.6 °F (36.4 °C) (Oral)   Resp 16   Ht 160 cm (63\")   Wt 56.2 kg (124 lb)   SpO2 99%   BMI 21.97 kg/m²    Intake/Output Summary (Last 24 hours) at 2024 0712  Last data filed at 12/10/2024 2004  Gross per 24 hour   Intake 600 ml   Output 200 ml   Net 400 ml    Temp (24hrs), Av.9 °F (36.6 °C), Min:97.6 °F (36.4 °C), Max:98.2 °F (36.8 °C)      Physical Exam  Vitals and nursing note reviewed.   Constitutional:       Appearance: Normal appearance.   HENT:      Head: Normocephalic and atraumatic.      Mouth/Throat:      Mouth: Mucous membranes are moist.   Eyes:      Pupils: Pupils are equal, round, and reactive to light.   Cardiovascular:      Rate and Rhythm: Normal rate and regular rhythm.      Heart sounds: Normal heart sounds.   Pulmonary:      Effort: Pulmonary effort is normal.      " Breath sounds: Normal breath sounds.   Abdominal:      General: Bowel sounds are normal.      Palpations: Abdomen is soft.   Skin:     General: Skin is warm.      Capillary Refill: Capillary refill takes less than 2 seconds.   Neurological:      General: No focal deficit present.      Mental Status: She is alert.             Assessment and Plan:     Primary Problem:  Acute and chronic respiratory failure with hypercapnia    Polypharmacy    Probable orthostatic hypotension related to prazosin    Hospital Problem list:    Acute and chronic respiratory failure with hypercapnia    Acute on chronic respiratory failure with hypercapnia      PMH:  Past Medical History:   Diagnosis Date    Fibromyalgia     Hyperlipidemia     Hypertension     Hypothyroidism     Low back pain     Migraine     Neuropathy        Treatment Plan:    Check CTA to rule out PE  3  Minimize neurosedating and potential respiratory sedating meds.     Disposition: home    Reviewed treatment plans with the patient and/or family.   30 minutes spent in face to face interaction and coordination of care.     Electronically signed by Ken Melvin MD on 12/11/2024 at 07:12 CST

## 2024-12-11 NOTE — PLAN OF CARE
Admitted 12/9 for syncope  12/11 plan for echocardiogram  Heparin subq bid    Problem: Adult Inpatient Plan of Care  Goal: Plan of Care Review  Outcome: Progressing  Flowsheets (Taken 12/11/2024 0307)  Plan of Care Reviewed With: patient     Problem: Syncope  Goal: Absence of Syncopal Symptoms  Outcome: Progressing  Intervention: Manage Effect of Syncopal Symptoms  Recent Flowsheet Documentation  Taken 12/11/2024 0212 by Kelby Celeste RN  Safety Promotion/Fall Prevention: safety round/check completed  Taken 12/11/2024 0100 by Kelby Celeste RN  Safety Promotion/Fall Prevention: safety round/check completed  Taken 12/11/2024 0034 by Kelby Celeste RN  Safety Promotion/Fall Prevention: safety round/check completed  Taken 12/10/2024 2201 by Kelby Celeste RN  Safety Promotion/Fall Prevention: safety round/check completed  Taken 12/10/2024 2123 by Kelby Celeste RN  Safety Promotion/Fall Prevention: safety round/check completed  Taken 12/10/2024 2004 by Kelby Celeste RN  Safety Promotion/Fall Prevention: safety round/check completed     Problem: Fall Injury Risk  Goal: Absence of Fall and Fall-Related Injury  Outcome: Progressing  Intervention: Promote Injury-Free Environment  Recent Flowsheet Documentation  Taken 12/11/2024 0212 by Kelby Celeste RN  Safety Promotion/Fall Prevention: safety round/check completed  Taken 12/11/2024 0100 by Kelby Celeste RN  Safety Promotion/Fall Prevention: safety round/check completed  Taken 12/11/2024 0034 by Kelby Celeste RN  Safety Promotion/Fall Prevention: safety round/check completed  Taken 12/10/2024 2201 by Kelby Celeste RN  Safety Promotion/Fall Prevention: safety round/check completed  Taken 12/10/2024 2123 by Kelby Celeste RN  Safety Promotion/Fall Prevention: safety round/check completed  Taken 12/10/2024 2004 by Kelby Celeste RN  Safety Promotion/Fall Prevention: safety round/check completed     Problem: Adult Inpatient Plan of Care  Goal: Absence of Hospital-Acquired Illness or  Injury  Intervention: Identify and Manage Fall Risk  Recent Flowsheet Documentation  Taken 12/11/2024 0212 by Kelby Celeste RN  Safety Promotion/Fall Prevention: safety round/check completed  Taken 12/11/2024 0100 by Kelby Celeste RN  Safety Promotion/Fall Prevention: safety round/check completed  Taken 12/11/2024 0034 by Kelby Celeste RN  Safety Promotion/Fall Prevention: safety round/check completed  Taken 12/10/2024 2201 by Kelby Celeste RN  Safety Promotion/Fall Prevention: safety round/check completed  Taken 12/10/2024 2123 by Kelby Celeste RN  Safety Promotion/Fall Prevention: safety round/check completed  Taken 12/10/2024 2004 by Kelby Celeste RN  Safety Promotion/Fall Prevention: safety round/check completed  Intervention: Prevent Skin Injury  Recent Flowsheet Documentation  Taken 12/10/2024 2004 by Kelby Celeste RN  Body Position: position changed independently  Intervention: Prevent and Manage VTE (Venous Thromboembolism) Risk  Recent Flowsheet Documentation  Taken 12/10/2024 2004 by Kelby Celeste RN  VTE Prevention/Management: (heparin subq bid) other (see comments)   Goal Outcome Evaluation:  Plan of Care Reviewed With: patient

## 2024-12-11 NOTE — CASE MANAGEMENT/SOCIAL WORK
Discharge Planning Assessment  [facility]     Patient Name: Destiny Morelos  MRN: [id number]  Today's Date: 12/11/2024    Admit Date: 12/9/2024        Discharge Needs Assessment       Row Name 12/11/24 1047       Living Environment    People in Home child(omari), adult;grandchild(omari)    Name(s) of People in Home Yuli Werner    Current Living Arrangements home    Primary Care Provided by self    Provides Primary Care For no one    Family Caregiver if Needed child(omari), adult    Family Caregiver Names Yuli Werner    Quality of Family Relationships supportive;helpful;involved    Able to Return to Prior Arrangements yes       Resource/Environmental Concerns    Resource/Environmental Concerns none    Transportation Concerns none       Transition Planning    Transportation Anticipated family or friend will provide       Discharge Needs Assessment    Readmission Within the Last 30 Days no previous admission in last 30 days    Equipment Currently Used at Home none    Concerns to be Addressed discharge planning    Anticipated Changes Related to Illness none    Equipment Needed After Discharge oxygen    Current Discharge Risk chronically ill    Discharge Coordination/Progress Patient resides at home with her daughter and is independent, has a PCP and RX coverage.  SS consult received advising patient may need home O2 at discharge.  SW will follow and arrange home O2 at discharge if needed.                   Discharge Plan    No documentation.                 Continued Care and Services - Admitted Since 12/9/2024    No active coordination exists for this encounter.          Demographic Summary    No documentation.                  Functional Status    No documentation.                  Psychosocial    No documentation.                  Abuse/Neglect    No documentation.                  Legal    No documentation.                  Substance Abuse    No documentation.                  Patient Forms    No  documentation.                     DESMOND KnoxW

## 2024-12-11 NOTE — PLAN OF CARE
Goal Outcome Evaluation:                         Patient discharged home with cousin. Only belonging are clothing items.

## 2024-12-11 NOTE — DISCHARGE SUMMARY
Hospital Discharge Summary    Destiny Moreols  :  1965  MRN:  [id number]    Admit date:  2024  Discharge date:  2024    Admitting Physician:    Ken Melvin MD    Discharge Diagnoses:      Acute and chronic respiratory failure with hypercapnia    Acute on chronic respiratory failure with hypercapnia      Hospital Course:   This is a 59-year-old female patient with a medical history of hypertension, dyslipidemia, fibromyalgia, hypothyroidism, presenting to the ED after passing out at her pharmacy.  Initially, the patient was somnolent, however after receiving ED treatment she improved significantly and was able to answer questions.  She does not remember completely what happened, but she is able to tell that she passed out, she did not hurt her head, and she landed on her knees.  She also remembers feeling short of breath before the episode occurred.  She denies any chest pain, any headache, any fever or chills, abdominal pain, nausea or vomiting.  To note that she takes several home medications including oxycodone and clonazepam.    No further syncopal spells since admit. Initially admitted to hospitalist service inadvertently. Note she was hypoxic and hypercarbic. Polypharmacy with multiple meds that can cause neurosedation and respiratory suppression. Just started on Prazosin at night for sleep after requesting this after taking a relatives supply and it helping sleep.      MRI Brain, Tele, and CTA of chest all normal. Symptoms likely due to polypharmacy. Of note, she gets oxycodone, bupap, soma and gabapentin from Dr Carney and clonazepam from us. We were not aware of this.     The patient was admitted for the above noted medical/surgical issues. Please see daily progress note for further details concerning their stay. The patient improved throughout their stay and reached maximum medical improvement on the day of discharge. The patient/family agree with the treatment plan as outlined above.  All questions concerning their stay were answered prior to discharge. They understand the importance of follow up concerning any abnormal test results.     Physical Exam      Discharge Medications:         Discharge Medications        Changes to Medications        Instructions Start Date   levothyroxine 88 MCG tablet  Commonly known as: SYNTHROID, LEVOTHROID  What changed: when to take this   88 mcg, Oral, Every Early Morning   Start Date: December 12, 2024            Continue These Medications        Instructions Start Date   amitriptyline 50 MG tablet  Commonly known as: ELAVIL   1 tablet, Oral, Nightly      ARIPiprazole 15 MG tablet  Commonly known as: ABILIFY   1 tablet, Every 24 Hours      busPIRone 10 MG tablet  Commonly known as: BUSPAR   10 mg, Oral, 3 Times Daily      butalbital-acetaminophen-caffeine-codeine -63-30 MG per capsule  Commonly known as: FIORICET WITH CODEINE   1 capsule, Oral, Every 6 Hours PRN      doxepin 50 MG capsule  Commonly known as: SINEquan   50 mg, Oral, Nightly      gabapentin 800 MG tablet  Commonly known as: NEURONTIN   800 mg, 3 Times Daily      naloxone 4 MG/0.1ML nasal spray  Commonly known as: NARCAN   Call 911. Don't prime. [place] in 1 nostril for overdose. Repeat in 2-3 minutes in other nostril if no or minimal breathing/responsiveness.      oxyCODONE-acetaminophen  MG per tablet  Commonly known as: PERCOCET   1 tablet, Oral, Every 6 Hours PRN      rOPINIRole 4 MG tablet  Commonly known as: REQUIP   4 mg, Oral, Nightly             Stop These Medications      carisoprodol 350 MG tablet  Commonly known as: SOMA     clonazePAM 0.5 MG tablet  Commonly known as: KlonoPIN     prazosin 2 MG capsule  Commonly known as: MINIPRESS              Activity: as tolerated    Diet: regular    Consults: none    Significant Diagnostic Studies:    CT Angiogram Chest    Result Date: 12/11/2024  1. No evidence of pulmonary embolism. No aortic aneurysm or dissection. 2. Lungs are  unremarkable.            This report was signed and finalized on 12/11/2024 10:37 AM by Dr. Sylvester Long MD.      MRI Brain With & Without Contrast    Result Date: 12/10/2024  Impression:  Normal MRI brain.  This report was signed and finalized on 12/10/2024 6:12 PM by Shorty Ricci.      XR Chest 1 View    Result Date: 12/10/2024  1. No active cardiopulmonary disease. 2. The deformity of the right proximal humerus which was not seen in the previous study dated 7/17/2024 may suggest an interval trauma and nondisplaced impacted fracture?. This need to be clinically correlated.   This report was signed and finalized on 12/10/2024 6:24 AM by Dr. Sylvester Long MD.      CT Head Without Contrast    Result Date: 12/10/2024  1. No acute intracranial abnormality.  The above study was initially reviewed and reported by [facility]. I do not find any discrepancies.             This report was signed and finalized on 12/10/2024 6:07 AM by Dr. Sylvester Long MD.            Treatments:   as above    Disposition:   home    Time spent on discharge including discussion with patient/family, SW, and coordination of care.     Follow up with London Rascon MD in 1 weeks.    Signed:  Ken Melvin MD   12/11/2024, 13:59 CST

## 2024-12-17 DIAGNOSIS — R51.9 NONINTRACTABLE HEADACHE, UNSPECIFIED CHRONICITY PATTERN, UNSPECIFIED HEADACHE TYPE: ICD-10-CM

## 2024-12-17 RX ORDER — OXYCODONE AND ACETAMINOPHEN 10; 325 MG/1; MG/1
1 TABLET ORAL EVERY 6 HOURS PRN
Qty: 45 TABLET | Refills: 0 | Status: SHIPPED | OUTPATIENT
Start: 2024-12-20 | End: 2025-01-19

## 2024-12-17 NOTE — TELEPHONE ENCOUNTER
Requested Prescriptions     Pending Prescriptions Disp Refills    oxyCODONE-acetaminophen (PERCOCET)  MG per tablet [Pharmacy Med Name: OXYCODONE/ACETAMINOPHEN 10-325MG TABLET] 45 tablet 0     Sig: Take 1 tablet by mouth every 6 hours as needed for Pain for up to 30 days. Max Daily Amount: 4 tablets       Last Office Visit:  10/17/2024  Next Office Visit:  1/16/2025  Last Medication Refill:  11/20/2024 with 0 MICHELLE Charles up to date:  11/19/2024     *RX updated to reflect   12/20/2024   fill date*

## 2025-01-09 ENCOUNTER — TELEPHONE (OUTPATIENT)
Dept: NEUROLOGY | Age: 60
End: 2025-01-09

## 2025-01-17 DIAGNOSIS — R51.9 NONINTRACTABLE HEADACHE, UNSPECIFIED CHRONICITY PATTERN, UNSPECIFIED HEADACHE TYPE: ICD-10-CM

## 2025-01-20 RX ORDER — OXYCODONE AND ACETAMINOPHEN 10; 325 MG/1; MG/1
1 TABLET ORAL EVERY 8 HOURS PRN
Qty: 45 TABLET | Refills: 0 | Status: SHIPPED | OUTPATIENT
Start: 2025-01-20 | End: 2025-02-19

## 2025-01-20 NOTE — TELEPHONE ENCOUNTER
Requested Prescriptions     Pending Prescriptions Disp Refills    oxyCODONE-acetaminophen (PERCOCET)  MG per tablet [Pharmacy Med Name: OXYCODONE/ACETAMINOPHEN 10-325MG TABLET] 45 tablet 0     Sig: TAKE ONE (1) TABLET BY MOUTH EVERY SIX (6) HOURS AS NEEDED FOR PAIN FOR UP TO 30 DAYS. MAX DAILY AMOUNT: 4 TABLETS       Last Office Visit:  10/17/2024  Next Office Visit:  4/24/2025  Last Medication Refill:  12/20/2024 with 0 MICHELLE Charles up to date:  11/19/2024    *RX updated to reflect   1/20/2025  fill date*

## 2025-02-03 DIAGNOSIS — R51.9 NONINTRACTABLE HEADACHE, UNSPECIFIED CHRONICITY PATTERN, UNSPECIFIED HEADACHE TYPE: ICD-10-CM

## 2025-02-03 DIAGNOSIS — G89.29 OTHER CHRONIC PAIN: ICD-10-CM

## 2025-02-03 RX ORDER — BUTALBITAL, ACETAMINOPHEN, CAFFEINE AND CODEINE PHOSPHATE 50; 325; 40; 30 MG/1; MG/1; MG/1; MG/1
CAPSULE ORAL
Qty: 120 CAPSULE | Refills: 5 | OUTPATIENT
Start: 2025-02-03 | End: 2025-03-05

## 2025-02-04 ENCOUNTER — TELEPHONE (OUTPATIENT)
Dept: NEUROLOGY | Age: 60
End: 2025-02-04

## 2025-02-04 DIAGNOSIS — G89.29 OTHER CHRONIC PAIN: ICD-10-CM

## 2025-02-04 DIAGNOSIS — R51.9 NONINTRACTABLE HEADACHE, UNSPECIFIED CHRONICITY PATTERN, UNSPECIFIED HEADACHE TYPE: ICD-10-CM

## 2025-02-04 NOTE — TELEPHONE ENCOUNTER
Patient ask for a nurse to called her to discuss her   Butalbital-APAP-Caff-Cod -49-30 MG   Medication.

## 2025-02-05 RX ORDER — BUTALBITAL, ACETAMINOPHEN, CAFFEINE AND CODEINE PHOSPHATE 50; 325; 40; 30 MG/1; MG/1; MG/1; MG/1
CAPSULE ORAL
Qty: 120 CAPSULE | Refills: 0 | Status: SHIPPED | OUTPATIENT
Start: 2025-02-05 | End: 2025-02-24

## 2025-02-05 NOTE — TELEPHONE ENCOUNTER
Requested Prescriptions     Pending Prescriptions Disp Refills    butalbital-acetaminophen-caffeine-codeine (FIORICET WITH CODEINE) -63-30 MG per capsule 120 capsule 5     Sig: TAKE (1) CAPSULE EVERY SIX HOURS AS NEEDED.       Last Office Visit:  10/17/2024  Next Office Visit:  4/24/2025  Last Medication Refill:  9/30/2024 with 5 RF   Melvin up to date:  2/2025    *RX updated to reflect   2/5/2025  fill date*

## 2025-02-17 ENCOUNTER — HOSPITAL ENCOUNTER (OUTPATIENT)
Dept: PAIN MANAGEMENT | Age: 60
Discharge: HOME | End: 2025-02-17
Payer: MEDICARE

## 2025-02-17 VITALS
HEART RATE: 73 BPM | SYSTOLIC BLOOD PRESSURE: 162 MMHG | RESPIRATION RATE: 18 BRPM | TEMPERATURE: 96.7 F | DIASTOLIC BLOOD PRESSURE: 94 MMHG | OXYGEN SATURATION: 95 %

## 2025-02-17 DIAGNOSIS — R51.9 NONINTRACTABLE HEADACHE, UNSPECIFIED CHRONICITY PATTERN, UNSPECIFIED HEADACHE TYPE: ICD-10-CM

## 2025-02-17 DIAGNOSIS — G89.29 OTHER CHRONIC PAIN: ICD-10-CM

## 2025-02-17 DIAGNOSIS — M54.2 PAIN, NECK: ICD-10-CM

## 2025-02-17 PROCEDURE — 64405 NJX AA&/STRD GR OCPL NRV: CPT | Performed by: PSYCHIATRY & NEUROLOGY

## 2025-02-17 PROCEDURE — 6360000002 HC RX W HCPCS

## 2025-02-17 PROCEDURE — 64405 NJX AA&/STRD GR OCPL NRV: CPT

## 2025-02-17 RX ORDER — OXYCODONE AND ACETAMINOPHEN 10; 325 MG/1; MG/1
1 TABLET ORAL EVERY 8 HOURS PRN
Qty: 45 TABLET | Refills: 0 | Status: SHIPPED | OUTPATIENT
Start: 2025-02-17 | End: 2025-03-19

## 2025-02-17 RX ORDER — CARISOPRODOL 350 MG/1
350 TABLET ORAL 3 TIMES DAILY PRN
Qty: 90 TABLET | Refills: 5 | Status: SHIPPED | OUTPATIENT
Start: 2025-02-17 | End: 2025-03-19

## 2025-02-17 RX ORDER — BUTALBITAL, ACETAMINOPHEN, CAFFEINE AND CODEINE PHOSPHATE 50; 325; 40; 30 MG/1; MG/1; MG/1; MG/1
CAPSULE ORAL
Qty: 120 CAPSULE | Refills: 5 | Status: SHIPPED | OUTPATIENT
Start: 2025-02-17 | End: 2025-03-08

## 2025-02-17 RX ORDER — BUPIVACAINE HYDROCHLORIDE 2.5 MG/ML
5 INJECTION, SOLUTION EPIDURAL; INFILTRATION; INTRACAUDAL ONCE
Status: DISCONTINUED | OUTPATIENT
Start: 2025-02-17 | End: 2025-02-19 | Stop reason: HOSPADM

## 2025-02-17 NOTE — DISCHARGE INSTRUCTIONS
[facility]  Post Procedure Discharge Instructions        YOU HAVE HAD THE FOLLOWING PROCEDURE:                                  [x] Occipital Nerve Blocks  [] CTS wrist injection(s)  [] Knee Injection(s)         [] Shoulder Injection(s)   [] Elbow Injection(s)     [] Botox Injection  [] Cervical Trigger Point Injections    [] Thoracic Trigger Point Injections    [] Lumbar Trigger Point Injections  [] Piriformis Trigger Point Injections  [] SI Joint Injection(s)     [] Trochanteric Bursa Injection(s)       [] Ankle Injection(s)   [] Plantar Fasciitis   []  ______________  Injection(s) [] Botox []  Migraines [] Spasticity    YOU HAVE RECEIVED THE FOLLOWING MEDICATIONS IN YOUR INJECTION(s)  [] Lidocaine [x] Bupivacaine   [] DepoMedrol (steroid) [] Decadron (steroid)  []  Kenalog (steroid)   [] Toradol  [] Supartz [] Zilretta    [] Botox        PATIENT INFORMATION:   You may experience the following symptoms after your procedure. These symptoms are normal and should not cause concern:    You may have an increase in your pain. This may last 24 - 48 hours after your procedure.  You may have no change in the pain that you had prior to your injection(s).  You may have weakness or numbness in your affected extremity. If this occurs, this may last until numbing the medication wears off.     REPORT THE FOLLOWING SYMPTOMS TO YOUR DOCTOR:  Redness, swelling or drainage at the injection site(s)  Unusual pain that interferes with your normal activities of daily living.    OTHER INSTRUCTIONS:    [x] I will apply ice to the injection site(s) for at least 24 hours after the procedure. I will rotate the ice on for 20 minutes and off for 20 minutes for at least 24 hours.    [x] I will not apply heat for at least 48 hours and I will not take a hot bath or shower for at least 24 hours.     [x] I understand that if Lidocaine or Bupivacaine was used in my injection(s) that the injection site(s)

## 2025-02-17 NOTE — PROGRESS NOTES
Procedure:  Level of Consciousness: [x]Alert [x]Oriented []Disoriented []Lethargic  Anxiety Level: [x]Calm []Anxious []Depressed []Other  Skin: [x]Warm [x]Dry []Cool []Moist []Intact []Other  Cardiovascular: []Palpitations: [x]Never []Occasionally []Frequently  Chest Pain: [x]No []Yes  Respiratory:  [x]Unlabored []Labored []Cough ([] Productive []Unproductive)  HCG Required: [x]No []Yes   Results: []Negative []Positive  Knowledge Level:        [x]Patient/Other verbalized understanding of pre-procedure instructions.        [x]Assessment of post-op care needs (transportation, responsible caregiver)        [x]Able to discuss health care problems and how to deal with it.  Factors that Affect Teaching:        Language Barrier: [x]No []Yes - why:        Hearing Loss:        [x]No []Yes            Corrective Device:  []Yes []No        Vision Loss:           [x]No []Yes            Corrective Device:  []Yes []No        Memory Loss:       [x]No []Yes            []Short Term []Long Term  Motivational Level:  [x]Asks Questions                  []Extremely Anxious       [x]Seems Interested               []Seems Uninterested                  []Denies need for Education  Risk for Injury:  [x]Patient oriented to person, place and time  []History of frequent falls/loss of balance  Nutritional:  []Change in appetite   []Weight Gain   []Weight Loss  Functional:  []Requires assistance with ADL's

## 2025-02-17 NOTE — PROCEDURES
[x] Bilateral Occipital Nerve distribution.    Sterile dressing applied.    Discharge:  The patient tolerated the procedure well. There were no complications during the procedure and the patient was discharged home with discharge instructions.    The patient has been instructed to contact the office should there be any complications or questions to arise between today and their next appointment.    Plan:  [] Will return to the office in   [x] 1 month  [] 4 - 6 weeks   [] 2 months   [] 3 months for:  [] Planned Procedure   [] Procedure Follow-up   [] Office Visit    Nikolas Sanz MD, 2/17/2025 at 8:59 AM

## 2025-03-14 DIAGNOSIS — R51.9 NONINTRACTABLE HEADACHE, UNSPECIFIED CHRONICITY PATTERN, UNSPECIFIED HEADACHE TYPE: ICD-10-CM

## 2025-03-17 RX ORDER — OXYCODONE AND ACETAMINOPHEN 10; 325 MG/1; MG/1
1 TABLET ORAL EVERY 6 HOURS PRN
Qty: 45 TABLET | Refills: 0 | Status: SHIPPED | OUTPATIENT
Start: 2025-03-17 | End: 2025-04-16

## 2025-03-17 NOTE — TELEPHONE ENCOUNTER
Requested Prescriptions     Pending Prescriptions Disp Refills    oxyCODONE-acetaminophen (PERCOCET)  MG per tablet [Pharmacy Med Name: OXYCODONE/ACETAMINOPHEN 10-325MG TABLET] 45 tablet 0     Sig: TAKE ONE (1) TABLET BY MOUTH EVERY 8 HOURS AS NEEDED FOR PAIN FOR UP TO 30 DAYS. MAX DAILY AMOUNT: THREE (3) TABLETS       Last Office Visit:  10/17/2024  Next Office Visit:  4/24/2025  Last Medication Refill:  2/14/2025  Melvin up to date:  up to date    RX updated to reflect   3/17/2025  fill date

## 2025-03-24 ENCOUNTER — HOSPITAL ENCOUNTER (OUTPATIENT)
Dept: PAIN MANAGEMENT | Age: 60
Discharge: HOME | End: 2025-03-24
Payer: MEDICARE

## 2025-03-24 VITALS
DIASTOLIC BLOOD PRESSURE: 90 MMHG | OXYGEN SATURATION: 93 % | RESPIRATION RATE: 18 BRPM | SYSTOLIC BLOOD PRESSURE: 135 MMHG | TEMPERATURE: 97.3 F | HEART RATE: 84 BPM

## 2025-03-24 PROCEDURE — 6360000002 HC RX W HCPCS

## 2025-03-24 PROCEDURE — 64405 NJX AA&/STRD GR OCPL NRV: CPT

## 2025-03-24 PROCEDURE — 64615 CHEMODENERV MUSC MIGRAINE: CPT

## 2025-03-24 PROCEDURE — 64405 NJX AA&/STRD GR OCPL NRV: CPT | Performed by: PSYCHIATRY & NEUROLOGY

## 2025-03-24 RX ORDER — SODIUM CHLORIDE 9 MG/ML
4.5 INJECTION, SOLUTION INTRAMUSCULAR; INTRAVENOUS; SUBCUTANEOUS ONCE
Status: DISCONTINUED | OUTPATIENT
Start: 2025-03-24 | End: 2025-03-26 | Stop reason: HOSPADM

## 2025-03-24 NOTE — PROCEDURES
PROCEDURE NOTE  Date: 3/24/2025   Name: Ally Gonzalez  YOB: 1965    Procedures                [facility]      Patient Name: Ally Gonzalez    : 1965    Age: 59 y.o.    Sex: female    Date: 2025    Pre-op Diagnosis: Occipital Neuralgia, Chronic Headaches, Tension Type Headaches    Post-op Diagnosis: Occipital Neuralgia, Chronic Headaches, Tension Type Headaches    Procedure: Occipital Nerve Block of the  [] Right  [] Left  [x] Bilateral Occipital Nerve    Performing Procedure: Dr. Nikolas Sanz    Previously Had Procedure:  [x] Yes   [] No    Patient Vitals for the past 24 hrs:   BP Temp Temp src Pulse Resp SpO2   25 0830 (!) 135/90 97.3 °F (36.3 °C) Temporal 84 18 93 %       Description of Procedure:    After a brief physical assessment and failure to improve with conservative measures the patient presented for a Occipital Nerve Block injection of the  [] Right   [] Left  [x] Bilateral Occipital Nerve. The indications, limitations and possible complications were discussed with the patient and the patient elected to proceed with the procedure.    After voluntary, informed and signed consent obtained the patient was placed in a sitting position.      Appropriate time out was obtained per policy.     The external occipital protuberance, superior nuchal line and occipital artery were identified with palpation. The area of maximal tenderness was palpated over the  [] Right   [] Left   [x] Bilateral Occipital Nerve region and the skin overlying this area marked.     The skin overlying the proposed injection site(s) were then prepped in a sterile fashion with [employer] swab. Under sterile technique a 25 gauge 1 1/2 inch needle was introduced into the [] Right   [] Left   [x] Bilateral Occipital Nerve region. After a negative aspiration a solution of  5 cc of 0.25% bupivacaine into the bilateral cervical and occipital regions and fanned out over the [] Right  [] Left  [x]

## 2025-03-24 NOTE — PROGRESS NOTES
[facility]   [address]  [place] [address]  [phone] p  [phone]    Procedure:  Level of Consciousness: [x]Alert [x]Oriented []Disoriented []Lethargic  Anxiety Level: [x]Calm []Anxious []Depressed []Other  Skin: []Warm [x]Dry []Cool []Moist []Intact []Other  Cardiovascular: []Palpitations: [x]Never []Occasionally []Frequently  Chest Pain: [x]No []Yes  Respiratory:  [x]Unlabored []Labored []Cough ([] Productive []Unproductive)  HCG Required: [x]No []Yes   Results: []Negative []Positive  Knowledge Level:        [x]Patient/Other verbalized understanding of pre-procedure instructions.        [x]Assessment of post-op care needs (transportation, responsible caregiver)        [x]Able to discuss health care problems and how to deal with it.  Factors that Affect Teaching:        Language Barrier: [x]No []Yes - why:        Hearing Loss:        [x]No []Yes            Corrective Device:  []Yes []No        Vision Loss:           [x]No []Yes            Corrective Device:  []Yes []No        Memory Loss:       [x]No []Yes            []Short Term []Long Term  Motivational Level:  [x]Asks Questions                  []Extremely Anxious       [x]Seems Interested               []Seems Uninterested                  []Denies need for Education  Risk for Injury:  [x]Patient oriented to person, place and time  []History of frequent falls/loss of balance  Nutritional:  []Change in appetite   []Weight Gain   []Weight Loss  Functional:  []Requires assistance with ADL's      Migraine  Follow up Assessment:  Patient experiences 3 headaches per month  Patient states that he/she has  1 headaches out of 30 days each month.  Patient states that he/she has 2 migraines out of 30 days each month.  Patient has experienced a  reduction in Migraine headaches less than 15 days per month [x]Yes []No  Patient has experienced a reduction in Migraine hours [x]Yes []No

## 2025-04-15 DIAGNOSIS — R51.9 NONINTRACTABLE HEADACHE, UNSPECIFIED CHRONICITY PATTERN, UNSPECIFIED HEADACHE TYPE: ICD-10-CM

## 2025-04-15 RX ORDER — OXYCODONE AND ACETAMINOPHEN 10; 325 MG/1; MG/1
1 TABLET ORAL EVERY 6 HOURS PRN
Qty: 45 TABLET | Refills: 0 | Status: SHIPPED | OUTPATIENT
Start: 2025-04-15 | End: 2025-05-15

## 2025-04-15 NOTE — TELEPHONE ENCOUNTER
Ally Gonzaelz has requested a refill on her medication.      Last office visit : 10/17/2024   Next office visit : 4/24/2025   Last medication refill : 03/17/2025  Melvin : 02/04/2025      Requested Prescriptions     Pending Prescriptions Disp Refills    oxyCODONE-acetaminophen (PERCOCET)  MG per tablet [Pharmacy Med Name: OXYCODONE/ACETAMINOPHEN 10-325MG TABLET] 45 tablet 0     Sig: Take 1 tablet by mouth every 6 hours as needed for Pain for up to 30 days. Max Daily Amount: 4 tablets

## 2025-04-24 ENCOUNTER — OFFICE VISIT (OUTPATIENT)
Dept: NEUROLOGY | Age: 60
End: 2025-04-24
Payer: MEDICARE

## 2025-04-24 VITALS
BODY MASS INDEX: 20.02 KG/M2 | HEART RATE: 68 BPM | WEIGHT: 113 LBS | SYSTOLIC BLOOD PRESSURE: 132 MMHG | HEIGHT: 63 IN | DIASTOLIC BLOOD PRESSURE: 74 MMHG

## 2025-04-24 DIAGNOSIS — M54.2 PAIN, NECK: ICD-10-CM

## 2025-04-24 DIAGNOSIS — R51.9 NONINTRACTABLE HEADACHE, UNSPECIFIED CHRONICITY PATTERN, UNSPECIFIED HEADACHE TYPE: Primary | ICD-10-CM

## 2025-04-24 DIAGNOSIS — G43.909 MIGRAINE WITHOUT STATUS MIGRAINOSUS, NOT INTRACTABLE, UNSPECIFIED MIGRAINE TYPE: ICD-10-CM

## 2025-04-24 DIAGNOSIS — G89.29 OTHER CHRONIC PAIN: ICD-10-CM

## 2025-04-24 PROCEDURE — G8420 CALC BMI NORM PARAMETERS: HCPCS | Performed by: PSYCHIATRY & NEUROLOGY

## 2025-04-24 PROCEDURE — 4004F PT TOBACCO SCREEN RCVD TLK: CPT | Performed by: PSYCHIATRY & NEUROLOGY

## 2025-04-24 PROCEDURE — 99213 OFFICE O/P EST LOW 20 MIN: CPT | Performed by: PSYCHIATRY & NEUROLOGY

## 2025-04-24 PROCEDURE — 3017F COLORECTAL CA SCREEN DOC REV: CPT | Performed by: PSYCHIATRY & NEUROLOGY

## 2025-04-24 PROCEDURE — G8427 DOCREV CUR MEDS BY ELIG CLIN: HCPCS | Performed by: PSYCHIATRY & NEUROLOGY

## 2025-04-24 NOTE — PROGRESS NOTES
REVIEW OF SYSTEMS    Constitutional: []Fever []Sweat []Chills [] Recent Injury [x] Denies all unless marked  HEENT:[x]Headache  [] Head Injury/Hearing Loss  [] Sore Throat  [] Ear Ache/Dizziness  [x] Denies all unless marked  Spine:  [] Neck pain  [] Back pain  [] Sciaticia  [x] Denies all unless marked  Cardiovascular:[]Heart Disease []Chest Pain [] Palpitations  [x] Denies all unless marked  Pulmonary: []Shortness of Breath []Cough   [x] Denies all unless marke  Gastrointestinal: []Nausea  []Vomiting  []Abdominal Pain  []Constipation  []Diarrhea  []Dark Bloody Stools  [x] Denies all unless marked  Psychiatric/Behavioral:[] Depression [] Anxiety [x] Denies all unless marked  Genitourinary:   [] Frequency  [] Urgency  [] Incontinence [] Pain with Urination  [x] Denies all unless marked  Extremities: []Pain  []Swelling  [x] Denies all unless marked  Musculoskeletal: [] Muscle Pain  [] Joint Pain  [] Arthritis [] Muscle Cramps [] Muscle Twitches  [x] Denies all unless marked  Sleep: [] Insomnia [] Snoring [] Restless Legs [] Sleep Apnea  [] Daytime Sleepiness  [x] Denies all unless marked  Skin:[] Rash [] Skin Discoloration [x] Denies all unless marked   Neurological: []Visual Disturbance/Memory Loss [] Loss of Balance [] Slurred Speech/Weakness [] Seizures  [] Vertigo/Dizziness [x] Denies all unless marked

## 2025-04-24 NOTE — PROGRESS NOTES
Ally Gonzalez is a 59 y.o. year old female who is seen for evaluation of multiple complaints. 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 One day she was taking some hot items out of the oven when something burnt her and she turned her head quickly. She heard a popping noise and following this began to have significant neck pain. With time the pain improved. She then once again turned her head quickly and heard a popping noise. Her headaches returned once again. The headache involves the back of her head with no associated photophobia, phonophobia, or nausea. In addition she has migraines approximately once her week following her hysterectomy. These can last for up to two days. She has been tried on multiple medications in the past for migraine prophylaxis including propranolol, Topamax, and Elavil and Depakote. She complaints of numbness in the third and fourth fingers of her right hand at times. She has significant neck and back pain.  She continues to get nerve blocks which lasts for 3 to 4 weeks.  Hands better. .Had 5 seizure in December 2023 due to stress. Nothing since then. Started on Keppra 500 mg bid. Losing weight. Smell of things make her nausea. No seizures. Feeling about the same. No seizures.    Chief complaint: neck pain  .    Active Ambulatory Problems     Diagnosis Date Noted    Breast lesion, right 11/22/2013    Breast mass, right 11/22/2013    Costochondral chest pain 11/22/2013    Pain, neck 06/13/2016    Migraine without status migrainosus, not intractable 06/13/2016    Headache disorder 06/13/2016    Chronic pain 07/20/2016    Voice hoarseness 01/25/2017    Nonintractable headache 05/22/2019     Resolved Ambulatory Problems     Diagnosis Date Noted    No Resolved Ambulatory Problems     Past Medical History:   Diagnosis Date    Chronic back pain     Depression     Fibromyalgia     Headache     Hyperlipidemia     Hypertension     Hypothyroidism     Neuropathy        Past

## 2025-04-28 ENCOUNTER — HOSPITAL ENCOUNTER (OUTPATIENT)
Dept: PAIN MANAGEMENT | Age: 60
Discharge: HOME | End: 2025-04-28
Payer: MEDICARE

## 2025-04-28 VITALS
HEART RATE: 112 BPM | SYSTOLIC BLOOD PRESSURE: 160 MMHG | RESPIRATION RATE: 18 BRPM | DIASTOLIC BLOOD PRESSURE: 102 MMHG | TEMPERATURE: 97.3 F | OXYGEN SATURATION: 96 %

## 2025-04-28 PROCEDURE — 6360000002 HC RX W HCPCS

## 2025-04-28 PROCEDURE — 64405 NJX AA&/STRD GR OCPL NRV: CPT

## 2025-04-28 PROCEDURE — 64405 NJX AA&/STRD GR OCPL NRV: CPT | Performed by: PSYCHIATRY & NEUROLOGY

## 2025-04-28 RX ORDER — BUPIVACAINE HYDROCHLORIDE 2.5 MG/ML
5 INJECTION, SOLUTION EPIDURAL; INFILTRATION; INTRACAUDAL; PERINEURAL ONCE
Status: DISCONTINUED | OUTPATIENT
Start: 2025-04-28 | End: 2025-04-30 | Stop reason: HOSPADM

## 2025-04-28 NOTE — PROCEDURES
PROCEDURE NOTE  Date: 2025   Name: Ally Gonzalez  YOB: 1965    Procedures              [facility]      Patient Name: Ally Gonzalez    : 1965    Age: 59 y.o.    Sex: female    Date: 2025    Pre-op Diagnosis: Occipital Neuralgia, Chronic Headaches, Tension Type Headaches    Post-op Diagnosis: Occipital Neuralgia, Chronic Headaches, Tension Type Headaches    Procedure: Occipital Nerve Block of the  [] Right  [] Left  [x] Bilateral Occipital Nerve    Performing Procedure: Dr. Nikolas Sanz    Previously Had Procedure:  [x] Yes   [] No    Patient Vitals for the past 24 hrs:   BP Temp Temp src Pulse Resp SpO2   25 0743 (!) 160/102 97.3 °F (36.3 °C) Temporal (!) 112 18 96 %       Description of Procedure:    After a brief physical assessment and failure to improve with conservative measures the patient presented for a Occipital Nerve Block injection of the  [] Right   [] Left  [x] Bilateral Occipital Nerve. The indications, limitations and possible complications were discussed with the patient and the patient elected to proceed with the procedure.    After voluntary, informed and signed consent obtained the patient was placed in a sitting position.      Appropriate time out was obtained per policy.     The external occipital protuberance, superior nuchal line and occipital artery were identified with palpation. The area of maximal tenderness was palpated over the  [] Right   [] Left   [x] Bilateral Occipital Nerve region and the skin overlying this area marked.     The skin overlying the proposed injection site(s) were then prepped in a sterile fashion with [employer] swab. Under sterile technique a 25 gauge 1 1/2 inch needle was introduced into the [] Right   [] Left   [x] Bilateral Occipital Nerve region. After a negative aspiration a solution of  5 cc of 0.25% bupivacaine into the bilateral cervical and occipital regions and fanned out over the [] Right  [] Left

## 2025-04-28 NOTE — DISCHARGE INSTRUCTIONS
[facility]  Post Procedure Discharge Instructions        YOU HAVE HAD THE FOLLOWING PROCEDURE:                                  [x] Occipital Nerve Blocks  [] CTS wrist injection(s)  [] Knee Injection(s)         [] Shoulder Injection(s)   [] Elbow Injection(s)     [] Botox Injection  [] Cervical Trigger Point Injections    [] Thoracic Trigger Point Injections    [] Lumbar Trigger Point Injections  [] Piriformis Trigger Point Injections  [] SI Joint Injection(s)     [] Trochanteric Bursa Injection(s)       [] Ankle Injection(s)   [] Plantar Fasciitis   []  ______________  Injection(s) [] Botox []  Migraines [] Spasticity    YOU HAVE RECEIVED THE FOLLOWING MEDICATIONS IN YOUR INJECTION(s)  [] Lidocaine [x] Bupivacaine   [] DepoMedrol (steroid) [] Decadron (steroid)  []  Kenalog (steroid)   [] Toradol  [] Supartz [] Zilretta    [] Botox        PATIENT INFORMATION:   You may experience the following symptoms after your procedure. These symptoms are normal and should not cause concern:    You may have an increase in your pain. This may last 24 - 48 hours after your procedure.  You may have no change in the pain that you had prior to your injection(s).  You may have weakness or numbness in your affected extremity. If this occurs, this may last until numbing the medication wears off.     REPORT THE FOLLOWING SYMPTOMS TO YOUR DOCTOR:  Redness, swelling or drainage at the injection site(s)  Unusual pain that interferes with your normal activities of daily living.    OTHER INSTRUCTIONS:    [x] I will apply ice to the injection site(s) for at least 24 hours after the procedure. I will rotate the ice on for 20 minutes and off for 20 minutes for at least 24 hours.    [x] I will not apply heat for at least 48 hours and I will not take a hot bath or shower for at least 24 hours.     [x] I understand that if Lidocaine or Bupivacaine was used in my injection(s) that the injection site(s)

## 2025-05-06 ENCOUNTER — TELEPHONE (OUTPATIENT)
Dept: GASTROENTEROLOGY | Age: 60
End: 2025-05-06

## 2025-05-13 DIAGNOSIS — R51.9 NONINTRACTABLE HEADACHE, UNSPECIFIED CHRONICITY PATTERN, UNSPECIFIED HEADACHE TYPE: ICD-10-CM

## 2025-05-13 RX ORDER — OXYCODONE AND ACETAMINOPHEN 10; 325 MG/1; MG/1
1 TABLET ORAL EVERY 6 HOURS PRN
Qty: 45 TABLET | Refills: 0 | Status: SHIPPED | OUTPATIENT
Start: 2025-05-13 | End: 2025-06-12

## 2025-05-13 NOTE — TELEPHONE ENCOUNTER
Ally Gonzalez has requested a refill on her medication.      Last office visit : 4/24/2025   Next office visit : 7/31/2025   Last medication refill : 04/15/2025  Melvin : 04/22/2025      Requested Prescriptions     Pending Prescriptions Disp Refills    oxyCODONE-acetaminophen (PERCOCET)  MG per tablet [Pharmacy Med Name: OXYCODONE/ACETAMINOPHEN 10-325MG TABLET] 45 tablet 0     Sig: Take 1 tablet by mouth every 6 hours as needed for Pain for up to 30 days. Max Daily Amount: 4 tablets

## 2025-05-22 NOTE — PROGRESS NOTES

## 2025-06-12 DIAGNOSIS — R51.9 NONINTRACTABLE HEADACHE, UNSPECIFIED CHRONICITY PATTERN, UNSPECIFIED HEADACHE TYPE: ICD-10-CM

## 2025-06-13 RX ORDER — OXYCODONE AND ACETAMINOPHEN 10; 325 MG/1; MG/1
1 TABLET ORAL EVERY 6 HOURS PRN
Qty: 45 TABLET | Refills: 0 | Status: SHIPPED | OUTPATIENT
Start: 2025-06-13 | End: 2025-07-13

## 2025-06-19 NOTE — TELEPHONE ENCOUNTER
"Physical Therapy    Physical Therapy Treatment    Patient Name: Estrella Villafana  MRN: [id number]  Encounter Date: 6/19/2025     Time Calculation  Start Time: 1235  Stop Time: 1315  Time Calculation (min): 40 min    Visit #: 9  out of 13 (authorized until 06/23/2025)  Insurance:  2025: EVAL ONLY - Auth required - [facility] MEDICARE [place] - [place] visits / 100% C[place]AGE / [place] /  12 follow up PT visits approved 05/12/2025-06/23/2025  Evaluation date: 5/12/25    Current Problem:   1. Low back pain  Follow Up In Physical Therapy        SUBJECTIVE:   Pt felt good after last session\"it was a good workout\"  Denies sx's into left hip this morning   Is more aware of standing more upright when walking, feels she can walk 5 or so min before needed to sit and rest, can walk a little longer in grocery store if leaning on and pushing cart.    Precautions:   STEADI Fall Risk: 3 (score of 4+ indicates fall risk)      Pain:   Start of session: 0/10 at rest  Sx level can fluctuate  btw 0 to 3-4 and feels hot humid weather effects level       OBJECTIVE:    Continues to ambulate with mod flexed trunk in gait with straight cane, can improve to min flexed trunk with effort  Treatments:  Therapeutic Exercise: (40 minutes)  NuStep (seat 7, UE 7) L3 x7 minutes -- increased duration  Standing B calf stretch off rocker board 3x20\"  Standing against wall: (chair in front for safety )    Dowel flexion 2x5 -- <90 degrees shoulder flexion achieved(close supervision)  Sit to stand to/from elevated mat, hands on thighs 2x10  Standing B heel raises 2x10   Standing B hip ABD+ EXT (diagonals) 2x10 ea LE  Standing marching with B UE support x10 ea LE  Seated hip isometric  adduction (pillow btw knees)2 x 10  Side stepping at counter top 10 feet x 4    HEP:  Access Code: [email or web address]  URL: [email or web address]  Date: 06/02/2025  Prepared by: Nikkie Hernandez    Exercises  - Supine Shoulder Flexion Extension AAROM with Dowel  - 1 x daily - 7 x weekly - 10 " Ms. JUAN called again and left a message requesting a refill on her Fioricet, Soma, and pain medicine. Pt was informed this morning that she can not have refill until she comes in office to sign a new medication contract. Tried to call pt back, unable to leave a message due to no voicemail. reps - 3 hold  - Supine Chin Tuck  - 1 x daily - 7 x weekly - 10 reps - 3 hold  - Seated Passive Cervical Retraction  - 1 x daily - 7 x weekly - 10 reps - 3 hold  - Standing Hip Flexor Stretch  - 1 x daily - 7 x weekly - 3 sets - 30 hold    Access Code: [email or web address], URL: [email or web address]  Date: 05/27/2025, Prepared by: Nikkie Hernandez  - Sit to Stand  - 1 x daily - 7 x weekly - 2 sets - 10 reps    5/12/25  -seated bilateral shoulder ext rotation 2 x 10  -H/L bent knee fall out 5 sec hold 1 x 10 L/R     Access Code: [email or web address], URL: [email or web address]  Date: 05/12/2025, Prepared by: Nikkie Hernandez  - Neutral Posture Sitting  - 10 reps - 10 hold  - Neutral Posture in Standing  - 7 x weekly - 10 reps - 10 hold  - Seated Scapular Retraction  - 2 x daily - 7 x weekly - 10 reps - 5 hold  - Supine Hip Adduction Isometric with Ball  - 2 x daily - 7 x weekly - 10 reps - 5 hold  - Supine Posterior Pelvic Tilt  - 2 x daily - 7 x weekly - 10 reps - 5 hold  - Supine Quad Set  - 2 x daily - 7 x weekly - 10 reps - 5 hold  - Modified Jose Stretch  - 2 x daily - 7 x weekly - 3 sets - 30 hold    ASSESSMENT:   Pt completed today session with focus on continued LE strengthening ,postural exercises and endurance activities without provoking  left hip sx's. Review of current HEP . Pt able to recall exercises from memory, did issue handouts however for more recent exercises performed in dept.Pt becoming more aware of posture during ambulation   Pt has achieved goals  #1, #2 and #5 progressing towards #3,#4    Post session pain: denies    PLAN:  Objective measures next session with update of progress towards goals to submit for additional authorization    OP PT PLAN:  Treatment/Interventions: Cryotherapy , Education/Instruction , Electrical Stimulation , Manual Therapy  , Self care/home management , Therapeutic activities , and Therapeutic exercise    PT Plan: Skilled PT   PT Frequency: 2 times per week    Duration: 6 weeks  Insurance: 2025: EVAL ONLY - Auth required - [facility] MEDICARE [facility] - [place] visits / 100% C[place]AGE / [place] VISITS /  [id number] / ds 5/11/25 //   Rehab Potential: Fair  Plan of Care Agreement: Patient    Goals:   1) Pt will decrease pain 50% from 0-8/10 to 0-4/10 for improved activity tolerance- achieved  2) Pt will improve sit to stand transfer to independent with UE support - achieved  3) Pt will improve standing/walking tolerance from 5-10 minutes to >15 minutes to improve ability to perform ADLs  -PROGRESSING  4) Pt will improve spinal extension to neutral in standing to reduce lumbopelvic strain  -PROGRESSING  5) Pt will demonstrate compliance and independence with HEP for self management of condition at discharge  -achieved

## 2025-06-30 ENCOUNTER — HOSPITAL ENCOUNTER (OUTPATIENT)
Dept: PAIN MANAGEMENT | Age: 60
Discharge: HOME | End: 2025-06-30
Payer: MEDICARE

## 2025-06-30 VITALS
OXYGEN SATURATION: 97 % | RESPIRATION RATE: 16 BRPM | SYSTOLIC BLOOD PRESSURE: 162 MMHG | TEMPERATURE: 97 F | DIASTOLIC BLOOD PRESSURE: 92 MMHG | HEART RATE: 88 BPM

## 2025-06-30 PROCEDURE — 6360000002 HC RX W HCPCS

## 2025-06-30 PROCEDURE — 64405 NJX AA&/STRD GR OCPL NRV: CPT | Performed by: PSYCHIATRY & NEUROLOGY

## 2025-06-30 PROCEDURE — 64405 NJX AA&/STRD GR OCPL NRV: CPT

## 2025-06-30 RX ORDER — BUPIVACAINE HYDROCHLORIDE 2.5 MG/ML
5 INJECTION, SOLUTION EPIDURAL; INFILTRATION; INTRACAUDAL; PERINEURAL ONCE
Status: DISCONTINUED | OUTPATIENT
Start: 2025-06-30 | End: 2025-07-02 | Stop reason: HOSPADM

## 2025-06-30 NOTE — DISCHARGE INSTRUCTIONS
[facility] Physical And Pain Medicine  Post Procedure Discharge Instructions        YOU HAVE HAD THE FOLLOWING PROCEDURE:                                  [x] Occipital Nerve Blocks  [] CTS wrist injection(s)  [] Knee Injection(s)         [] Shoulder Injection(s)   [] Elbow Injection(s)     [] Botox Injection  [] Cervical Trigger Point Injections    [] Thoracic Trigger Point Injections    [] Lumbar Trigger Point Injections  [] Piriformis Trigger Point Injections  [] SI Joint Injection(s)     [] Trochanteric Bursa Injection(s)       [] Ankle Injection(s)   [] Plantar Fasciitis   []  ______________  Injection(s) [] Botox []  Migraines [] Spasticity    YOU HAVE RECEIVED THE FOLLOWING MEDICATIONS IN YOUR INJECTION(s)  [] Lidocaine [x] Bupivacaine   [] DepoMedrol (steroid) [] Decadron (steroid)  []  Kenalog (steroid)   [] Toradol  [] Supartz [] Zilretta    [] Botox        PATIENT INFORMATION:   You may experience the following symptoms after your procedure. These symptoms are normal and should not cause concern:    You may have an increase in your pain. This may last 24 - 48 hours after your procedure.  You may have no change in the pain that you had prior to your injection(s).  You may have weakness or numbness in your affected extremity. If this occurs, this may last until numbing the medication wears off.     REPORT THE FOLLOWING SYMPTOMS TO YOUR DOCTOR:  Redness, swelling or drainage at the injection site(s)  Unusual pain that interferes with your normal activities of daily living.    OTHER INSTRUCTIONS:    [x] I will apply ice to the injection site(s) for at least 24 hours after the procedure. I will rotate the ice on for 20 minutes and off for 20 minutes for at least 24 hours.    [x] I will not apply heat for at least 48 hours and I will not take a hot bath or shower for at least 24 hours.     [x] I understand that if Lidocaine or Bupivacaine was used in my injection(s) that the injection site(s)

## 2025-06-30 NOTE — PROCEDURES
PROCEDURE NOTE  Date: 2025   Name: Ally Gonzalez  YOB: 1965    Procedures            [facility]      Patient Name: Ally Gonzalez    : 1965    Age: 60 y.o.    Sex: female    Date: 2025    Pre-op Diagnosis: Occipital Neuralgia, Chronic Headaches, Tension Type Headaches    Post-op Diagnosis: Occipital Neuralgia, Chronic Headaches, Tension Type Headaches    Procedure: Occipital Nerve Block of the  [] Right  [] Left  [x] Bilateral Occipital Nerve    Performing Procedure: Dr. Nikolas Sanz    Previously Had Procedure:  [x] Yes   [] No    Patient Vitals for the past 24 hrs:   BP Temp Temp src Pulse Resp SpO2   25 0853 (!) 162/92 97 °F (36.1 °C) Temporal 88 16 97 %       Description of Procedure:    After a brief physical assessment and failure to improve with conservative measures the patient presented for a Occipital Nerve Block injection of the  [] Right   [] Left  [x] Bilateral Occipital Nerve. The indications, limitations and possible complications were discussed with the patient and the patient elected to proceed with the procedure.    After voluntary, informed and signed consent obtained the patient was placed in a sitting position.      Appropriate time out was obtained per policy.     The external occipital protuberance, superior nuchal line and occipital artery were identified with palpation. The area of maximal tenderness was palpated over the  [] Right   [] Left   [x] Bilateral Occipital Nerve region and the skin overlying this area marked.     The skin overlying the proposed injection site(s) were then prepped in a sterile fashion with Prevantics swab. Under sterile technique a 25 gauge 1 1/2 inch needle was introduced into the [] Right   [] Left   [x] Bilateral Occipital Nerve region. After a negative aspiration a solution of  5 cc of 0.25% bupivacaine into the bilateral cervical and occipital regions and fanned out over the [] Right  [] Left  [x]

## 2025-06-30 NOTE — PROGRESS NOTES
Procedure:  Level of Consciousness: [x]Alert [x]Oriented []Disoriented []Lethargic  Anxiety Level: [x]Calm []Anxious []Depressed []Other  Skin: [x]Warm [x]Dry []Cool []Moist []Intact []Other  Cardiovascular: []Palpitations: [x]Never []Occasionally []Frequently  Chest Pain: [x]No []Yes  Respiratory:  [x]Unlabored []Labored []Cough ([] Productive []Unproductive)  HCG Required: []No []Yes   Results: []Negative []Positive  Knowledge Level:        Patient/Other verbalized understanding of pre-procedure instructions.        [x][x]Assessment of post-op care needs (transportation, responsible caregiver)        [x]Able to discuss health care problems and how to deal with it.  Factors that Affect Teaching:        Language Barrier: [x]No []Yes - why:        Hearing Loss:        [x]No []Yes            Corrective Device:  []Yes []No        Vision Loss:           [x]No []Yes            Corrective Device:  []Yes []No        Memory Loss:       [x]No []Yes            []Short Term []Long Term  Motivational Level:  []Asks Questions                  []Extremely Anxious       []Seems Interested               []Seems Uninterested                  [x]Denies need for Education  Risk for Injury:  [x]Patient oriented to person, place and time  []History of frequent falls/loss of balance  Nutritional:  []Change in appetite   []Weight Gain   []Weight Loss  Functional:  []Requires assistance with ADL's

## 2025-07-07 DIAGNOSIS — R51.9 NONINTRACTABLE HEADACHE, UNSPECIFIED CHRONICITY PATTERN, UNSPECIFIED HEADACHE TYPE: ICD-10-CM

## 2025-07-07 RX ORDER — OXYCODONE AND ACETAMINOPHEN 10; 325 MG/1; MG/1
1 TABLET ORAL EVERY 6 HOURS PRN
Qty: 45 TABLET | Refills: 0 | Status: SHIPPED | OUTPATIENT
Start: 2025-07-11 | End: 2025-08-10

## 2025-07-07 NOTE — TELEPHONE ENCOUNTER
Requested Prescriptions     Pending Prescriptions Disp Refills    oxyCODONE-acetaminophen (PERCOCET)  MG per tablet [Pharmacy Med Name: OXYCODONE/ACETAMINOPHEN 10-325MG TABLET] 45 tablet 0     Sig: Take 1 tablet by mouth every 6 hours as needed for Pain for up to 30 days. Max Daily Amount: 4 tablets       Last Office Visit: 4/24/2025  Next Office Visit: 7/31/2025  Last Medication Refill:6/13/2025

## 2025-07-30 DIAGNOSIS — G89.29 OTHER CHRONIC PAIN: ICD-10-CM

## 2025-07-30 DIAGNOSIS — R51.9 NONINTRACTABLE HEADACHE, UNSPECIFIED CHRONICITY PATTERN, UNSPECIFIED HEADACHE TYPE: ICD-10-CM

## 2025-07-30 RX ORDER — BUTALBITAL, ACETAMINOPHEN, CAFFEINE AND CODEINE PHOSPHATE 50; 325; 40; 30 MG/1; MG/1; MG/1; MG/1
CAPSULE ORAL
Qty: 120 CAPSULE | Refills: 5 | Status: SHIPPED | OUTPATIENT
Start: 2025-07-30 | End: 2025-12-30

## 2025-07-30 NOTE — TELEPHONE ENCOUNTER
Requested Prescriptions     Pending Prescriptions Disp Refills    butalbital-acetaminophen-caffeine-codeine (FIORICET WITH CODEINE) -71-30 MG per capsule [Pharmacy Med Name: BUTALBITAL/ACETAMINOPHEN/CAFFEINE/CODEINE CODEINE CAPSULE] 120 capsule 5     Sig: TAKE (1) CAPSULE EVERY SIX HOURS AS NEEDED       Last Office Visit:  4/24/2025  Next Office Visit:  7/31/2025  Last Medication Refill:  6/30/2025      *RX updated to reflect   7/30/2025  fill date*

## 2025-07-31 ENCOUNTER — OFFICE VISIT (OUTPATIENT)
Dept: NEUROLOGY | Age: 60
End: 2025-07-31
Payer: MEDICARE

## 2025-07-31 VITALS
HEIGHT: 63 IN | SYSTOLIC BLOOD PRESSURE: 160 MMHG | WEIGHT: 120.38 LBS | DIASTOLIC BLOOD PRESSURE: 93 MMHG | HEART RATE: 89 BPM | BODY MASS INDEX: 21.33 KG/M2

## 2025-07-31 DIAGNOSIS — R51.9 NONINTRACTABLE HEADACHE, UNSPECIFIED CHRONICITY PATTERN, UNSPECIFIED HEADACHE TYPE: Primary | ICD-10-CM

## 2025-07-31 DIAGNOSIS — M54.2 PAIN, NECK: ICD-10-CM

## 2025-07-31 DIAGNOSIS — G89.29 OTHER CHRONIC PAIN: ICD-10-CM

## 2025-07-31 DIAGNOSIS — G43.909 MIGRAINE WITHOUT STATUS MIGRAINOSUS, NOT INTRACTABLE, UNSPECIFIED MIGRAINE TYPE: ICD-10-CM

## 2025-07-31 PROCEDURE — G8420 CALC BMI NORM PARAMETERS: HCPCS | Performed by: PSYCHIATRY & NEUROLOGY

## 2025-07-31 PROCEDURE — 99213 OFFICE O/P EST LOW 20 MIN: CPT | Performed by: PSYCHIATRY & NEUROLOGY

## 2025-07-31 PROCEDURE — 3017F COLORECTAL CA SCREEN DOC REV: CPT | Performed by: PSYCHIATRY & NEUROLOGY

## 2025-07-31 PROCEDURE — G8427 DOCREV CUR MEDS BY ELIG CLIN: HCPCS | Performed by: PSYCHIATRY & NEUROLOGY

## 2025-07-31 PROCEDURE — 4004F PT TOBACCO SCREEN RCVD TLK: CPT | Performed by: PSYCHIATRY & NEUROLOGY

## 2025-07-31 RX ORDER — OXYCODONE AND ACETAMINOPHEN 10; 325 MG/1; MG/1
1 TABLET ORAL EVERY 6 HOURS PRN
Qty: 45 TABLET | Refills: 0 | Status: SHIPPED | OUTPATIENT
Start: 2025-08-08 | End: 2025-09-07

## 2025-07-31 RX ORDER — LEVETIRACETAM 500 MG/1
500 TABLET ORAL 2 TIMES DAILY
Qty: 60 TABLET | Refills: 5 | Status: SHIPPED | OUTPATIENT
Start: 2025-07-31

## 2025-07-31 NOTE — PROGRESS NOTES
Ally Gonzalez is a 60 y.o. year old female who is seen for evaluation of multiple complaints. 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 One day she was taking some hot items out of the oven when something burnt her and she turned her head quickly. She heard a popping noise and following this began to have significant neck pain. With time the pain improved. She then once again turned her head quickly and heard a popping noise. Her headaches returned once again. The headache involves the back of her head with no associated photophobia, phonophobia, or nausea. In addition she has migraines approximately once her week following her hysterectomy. These can last for up to two days. She has been tried on multiple medications in the past for migraine prophylaxis including propranolol, Topamax, and Elavil and Depakote. She complaints of numbness in the third and fourth fingers of her right hand at times. She has significant neck and back pain.  She continues to get nerve blocks which lasts for 3 to 4 weeks.  Hands better. .Had 5 seizure in December 2023 due to stress. Nothing since then. Started on Keppra 500 mg bid. Losing weight. Smell of things make her nausea. Feeling about the same. No seizures.    Chief complaint: neck pain  .    Active Ambulatory Problems     Diagnosis Date Noted    Breast lesion, right 11/22/2013    Breast mass, right 11/22/2013    Costochondral chest pain 11/22/2013    Pain, neck 06/13/2016    Migraine without status migrainosus, not intractable 06/13/2016    Headache disorder 06/13/2016    Chronic pain 07/20/2016    Voice hoarseness 01/25/2017    Nonintractable headache 05/22/2019     Resolved Ambulatory Problems     Diagnosis Date Noted    No Resolved Ambulatory Problems     Past Medical History:   Diagnosis Date    Chronic back pain     Depression     Fibromyalgia     Headache     Hyperlipidemia     Hypertension     Hypothyroidism     Neuropathy        Past Surgical History:

## 2025-08-08 ENCOUNTER — TRANSCRIBE ORDERS (OUTPATIENT)
Dept: ADMINISTRATIVE | Facility: HOSPITAL | Age: 60
End: 2025-08-08
Payer: MEDICARE

## 2025-08-08 DIAGNOSIS — R06.02 SHORTNESS OF BREATH: Primary | ICD-10-CM

## 2025-08-08 DIAGNOSIS — R06.02 SOB (SHORTNESS OF BREATH): ICD-10-CM

## 2025-08-11 ENCOUNTER — HOSPITAL ENCOUNTER (OUTPATIENT)
Dept: PAIN MANAGEMENT | Age: 60
Discharge: HOME | End: 2025-08-11
Payer: MEDICARE

## 2025-08-11 VITALS
HEART RATE: 72 BPM | RESPIRATION RATE: 16 BRPM | SYSTOLIC BLOOD PRESSURE: 140 MMHG | OXYGEN SATURATION: 93 % | DIASTOLIC BLOOD PRESSURE: 80 MMHG | TEMPERATURE: 98.2 F

## 2025-08-11 PROCEDURE — 64405 NJX AA&/STRD GR OCPL NRV: CPT | Performed by: PSYCHIATRY & NEUROLOGY

## 2025-08-11 PROCEDURE — 64405 NJX AA&/STRD GR OCPL NRV: CPT

## 2025-08-11 PROCEDURE — 6360000002 HC RX W HCPCS

## 2025-08-11 RX ORDER — BUPIVACAINE HYDROCHLORIDE 5 MG/ML
15 INJECTION, SOLUTION EPIDURAL; INTRACAUDAL; PERINEURAL ONCE
Status: DISCONTINUED | OUTPATIENT
Start: 2025-08-11 | End: 2025-08-13 | Stop reason: HOSPADM

## 2025-08-18 DIAGNOSIS — R51.9 NONINTRACTABLE HEADACHE, UNSPECIFIED CHRONICITY PATTERN, UNSPECIFIED HEADACHE TYPE: ICD-10-CM

## 2025-08-18 DIAGNOSIS — M54.2 PAIN, NECK: ICD-10-CM

## 2025-08-18 RX ORDER — CARISOPRODOL 350 MG/1
TABLET ORAL
Qty: 90 TABLET | Refills: 5 | Status: SHIPPED | OUTPATIENT
Start: 2025-08-18 | End: 2025-09-17

## 2025-09-02 DIAGNOSIS — R51.9 NONINTRACTABLE HEADACHE, UNSPECIFIED CHRONICITY PATTERN, UNSPECIFIED HEADACHE TYPE: ICD-10-CM

## 2025-09-02 RX ORDER — OXYCODONE AND ACETAMINOPHEN 10; 325 MG/1; MG/1
1 TABLET ORAL EVERY 6 HOURS PRN
Qty: 45 TABLET | Refills: 0 | Status: SHIPPED | OUTPATIENT
Start: 2025-09-05 | End: 2025-10-05